# Patient Record
Sex: MALE | Race: WHITE | Employment: UNEMPLOYED | URBAN - NONMETROPOLITAN AREA
[De-identification: names, ages, dates, MRNs, and addresses within clinical notes are randomized per-mention and may not be internally consistent; named-entity substitution may affect disease eponyms.]

---

## 2023-03-15 PROBLEM — R07.9 CHEST PAIN: Status: ACTIVE | Noted: 2023-02-10

## 2023-05-15 RX ORDER — ATORVASTATIN CALCIUM 80 MG/1
TABLET, FILM COATED ORAL
Qty: 30 TABLET | Refills: 2 | Status: SHIPPED | OUTPATIENT
Start: 2023-05-15

## 2023-05-15 RX ORDER — ASPIRIN 81 MG/1
TABLET, COATED ORAL
Qty: 30 TABLET | Refills: 2 | Status: SHIPPED | OUTPATIENT
Start: 2023-05-15

## 2023-05-15 RX ORDER — PRASUGREL 10 MG/1
TABLET, FILM COATED ORAL
Qty: 30 TABLET | Refills: 2 | Status: SHIPPED | OUTPATIENT
Start: 2023-05-15

## 2023-05-15 NOTE — TELEPHONE ENCOUNTER
Requested Prescriptions     Pending Prescriptions Disp Refills    metoprolol tartrate (LOPRESSOR) 25 MG tablet [Pharmacy Med Name: METOPROLOL TARTRATE 25MG TABS] 60 tablet 0     Sig: TAKE ONE TABLET BY MOUTH TWICE A DAY    atorvastatin (LIPITOR) 80 MG tablet [Pharmacy Med Name: ATORVASTATIN CALCIUM 80MG TABS] 30 tablet 0     Sig: TAKE ONE TABLET BY MOUTH NIGHTLY    prasugrel (EFFIENT) 10 MG TABS [Pharmacy Med Name: PRASUGREL HCL 10MG TABS] 30 tablet 0     Sig: TAKE ONE TABLET BY MOUTH EVERY DAY    ASPIRIN LOW DOSE 81 MG EC tablet [Pharmacy Med Name: ASPIRIN LOW DOSE 81MG TBEC] 30 tablet 0     Sig: TAKE ONE TABLET BY MOUTH EVERY DAY     Last OV 3/9/23  NEXT OV 6/9/23

## 2023-05-16 RX ORDER — ASPIRIN 81 MG/1
81 TABLET ORAL DAILY
Qty: 30 TABLET | Refills: 2 | OUTPATIENT
Start: 2023-05-16

## 2023-05-16 RX ORDER — PRASUGREL 10 MG/1
10 TABLET, FILM COATED ORAL DAILY
Qty: 30 TABLET | Refills: 2 | OUTPATIENT
Start: 2023-05-16

## 2023-05-16 RX ORDER — ATORVASTATIN CALCIUM 80 MG/1
80 TABLET, FILM COATED ORAL NIGHTLY
Qty: 30 TABLET | Refills: 2 | OUTPATIENT
Start: 2023-05-16

## 2023-07-24 RX ORDER — INSULIN ASPART 100 [IU]/ML
INJECTION, SOLUTION INTRAVENOUS; SUBCUTANEOUS
Qty: 15 ML | Refills: 0 | Status: SHIPPED | OUTPATIENT
Start: 2023-07-24

## 2023-07-24 NOTE — TELEPHONE ENCOUNTER
Requested Prescriptions     Pending Prescriptions Disp Refills    NOVOLOG FLEXPEN 100 UNIT/ML injection pen [Pharmacy Med Name: Camilo Thomasin 100UNIT/ML SOPN] 15 mL 0     Sig: INJECT 5 UNITS UNDER THE SKIN EACH MORNING AND AT BEDTIME (USE 2 UNITS TO PRIME)

## 2023-08-07 ENCOUNTER — OFFICE VISIT (OUTPATIENT)
Dept: CARDIOLOGY CLINIC | Age: 48
End: 2023-08-07
Payer: MEDICAID

## 2023-08-07 VITALS
BODY MASS INDEX: 33.07 KG/M2 | DIASTOLIC BLOOD PRESSURE: 86 MMHG | SYSTOLIC BLOOD PRESSURE: 134 MMHG | WEIGHT: 266 LBS | HEIGHT: 75 IN | HEART RATE: 85 BPM

## 2023-08-07 DIAGNOSIS — Z95.5 H/O HEART ARTERY STENT: ICD-10-CM

## 2023-08-07 DIAGNOSIS — I21.4 NSTEMI (NON-ST ELEVATION MYOCARDIAL INFARCTION) (HCC): ICD-10-CM

## 2023-08-07 DIAGNOSIS — I25.10 CORONARY ARTERY DISEASE INVOLVING NATIVE CORONARY ARTERY OF NATIVE HEART WITHOUT ANGINA PECTORIS: ICD-10-CM

## 2023-08-07 DIAGNOSIS — I25.9 CHEST PAIN DUE TO MYOCARDIAL ISCHEMIA, UNSPECIFIED ISCHEMIC CHEST PAIN TYPE: Primary | ICD-10-CM

## 2023-08-07 PROCEDURE — G8417 CALC BMI ABV UP PARAM F/U: HCPCS | Performed by: INTERNAL MEDICINE

## 2023-08-07 PROCEDURE — 99213 OFFICE O/P EST LOW 20 MIN: CPT | Performed by: INTERNAL MEDICINE

## 2023-08-07 PROCEDURE — 4004F PT TOBACCO SCREEN RCVD TLK: CPT | Performed by: INTERNAL MEDICINE

## 2023-08-07 PROCEDURE — G8427 DOCREV CUR MEDS BY ELIG CLIN: HCPCS | Performed by: INTERNAL MEDICINE

## 2023-08-07 ASSESSMENT — ENCOUNTER SYMPTOMS
VOMITING: 0
DIARRHEA: 0
SHORTNESS OF BREATH: 0
EYES NEGATIVE: 1
NAUSEA: 0
RESPIRATORY NEGATIVE: 1
GASTROINTESTINAL NEGATIVE: 1

## 2023-08-07 NOTE — PROGRESS NOTES
Class 1 obesity     Diabetes mellitus (HCC)     DVT (deep venous thrombosis) ([address]) 10/27/2014    Left popliteal vein     Hyperlipidemia     Hypertension     Tobacco abuse      Past Surgical History:   Procedure Laterality Date    APPENDECTOMY      at around age 1-11 years    CAROTID STENT  2023    CHOLECYSTECTOMY, LAPAROSCOPIC       Family History   Problem Relation Age of Onset    High Blood Pressure Mother     Heart Attack Father 58    Elevated Lipids Sister     Other Sister         stomach issues    No Known Problems Brother     Heart Attack Maternal Uncle     Heart Attack Paternal Uncle 28         of a MI at age 28 years, was a smoker    Down Syndrome Son     No Known Problems Son     No Known Problems Daughter      No Known Allergies  Current Outpatient Medications   Medication Sig Dispense Refill    NOVOLOG FLEXPEN 100 UNIT/ML injection pen INJECT 5 UNITS UNDER THE SKIN EACH MORNING AND AT BEDTIME (USE 2 UNITS TO PRIME) 15 mL 0    metoprolol tartrate (LOPRESSOR) 25 MG tablet TAKE ONE TABLET BY MOUTH TWICE A DAY 60 tablet 2    atorvastatin (LIPITOR) 80 MG tablet TAKE ONE TABLET BY MOUTH NIGHTLY 30 tablet 2    prasugrel (EFFIENT) 10 MG TABS TAKE ONE TABLET BY MOUTH EVERY DAY 30 tablet 2    ASPIRIN LOW DOSE 81 MG EC tablet TAKE ONE TABLET BY MOUTH EVERY DAY 30 tablet 2    glipiZIDE (GLUCOTROL) 10 MG tablet Take 1 tablet by mouth 2 times daily 60 tablet 3    nicotine (NICODERM CQ) 21 MG/24HR Place 1 patch onto the skin daily 30 patch 3    blood glucose monitor strips Test 2 times a day & as needed for symptoms of irregular blood glucose. Dispense sufficient amount for indicated testing frequency plus additional to accommodate PRN testing needs.  100 strip 3    Lancets MISC 1 each by Does not apply route 2 times daily 100 each 5    metFORMIN (GLUCOPHAGE) 1000 MG tablet Take 1 tablet by mouth 2 times daily (with meals) 180 tablet 0    Blood Glucose Monitoring Suppl (ONE TOUCH ULTRA 2) w/Device KIT 1 kit by

## 2023-08-09 RX ORDER — ATORVASTATIN CALCIUM 80 MG/1
TABLET, FILM COATED ORAL
Qty: 30 TABLET | Refills: 2 | Status: SHIPPED | OUTPATIENT
Start: 2023-08-09

## 2023-08-09 RX ORDER — ASPIRIN 81 MG/1
TABLET, COATED ORAL
Qty: 30 TABLET | Refills: 2 | Status: SHIPPED | OUTPATIENT
Start: 2023-08-09

## 2023-08-15 ENCOUNTER — OFFICE VISIT (OUTPATIENT)
Dept: INTERNAL MEDICINE | Age: 48
End: 2023-08-15
Payer: MEDICAID

## 2023-08-15 VITALS
SYSTOLIC BLOOD PRESSURE: 140 MMHG | BODY MASS INDEX: 34 KG/M2 | OXYGEN SATURATION: 98 % | WEIGHT: 272 LBS | DIASTOLIC BLOOD PRESSURE: 80 MMHG | TEMPERATURE: 97.2 F | HEART RATE: 76 BPM

## 2023-08-15 DIAGNOSIS — J06.9 UPPER RESPIRATORY INFECTION WITH COUGH AND CONGESTION: Primary | ICD-10-CM

## 2023-08-15 DIAGNOSIS — R05.9 COUGH, UNSPECIFIED TYPE: ICD-10-CM

## 2023-08-15 LAB
INFLUENZA A ANTIGEN, POC: NEGATIVE
INFLUENZA B ANTIGEN, POC: NEGATIVE
LOT EXPIRE DATE: NORMAL
LOT KIT NUMBER: NORMAL
SARS-COV-2, POC: NORMAL
VALID INTERNAL CONTROL: YES
VENDOR AND KIT NAME POC: NORMAL

## 2023-08-15 PROCEDURE — G8427 DOCREV CUR MEDS BY ELIG CLIN: HCPCS | Performed by: NURSE PRACTITIONER

## 2023-08-15 PROCEDURE — 4004F PT TOBACCO SCREEN RCVD TLK: CPT | Performed by: NURSE PRACTITIONER

## 2023-08-15 PROCEDURE — 99213 OFFICE O/P EST LOW 20 MIN: CPT | Performed by: NURSE PRACTITIONER

## 2023-08-15 PROCEDURE — G8417 CALC BMI ABV UP PARAM F/U: HCPCS | Performed by: NURSE PRACTITIONER

## 2023-08-15 RX ORDER — METHYLPREDNISOLONE 4 MG/1
TABLET ORAL
Qty: 1 KIT | Refills: 0 | Status: SHIPPED | OUTPATIENT
Start: 2023-08-15

## 2023-08-15 RX ORDER — CEFDINIR 300 MG/1
300 CAPSULE ORAL 2 TIMES DAILY
Qty: 20 CAPSULE | Refills: 0 | Status: SHIPPED | OUTPATIENT
Start: 2023-08-15 | End: 2023-08-25

## 2023-08-15 RX ORDER — DEXAMETHASONE SODIUM PHOSPHATE 10 MG/ML
10 INJECTION INTRAMUSCULAR; INTRAVENOUS ONCE
Status: COMPLETED | OUTPATIENT
Start: 2023-08-15 | End: 2023-08-15

## 2023-08-15 RX ADMIN — DEXAMETHASONE SODIUM PHOSPHATE 10 MG: 10 INJECTION INTRAMUSCULAR; INTRAVENOUS at 14:08

## 2023-08-15 ASSESSMENT — ENCOUNTER SYMPTOMS
SORE THROAT: 0
COUGH: 1

## 2023-08-15 NOTE — PROGRESS NOTES
After obtaining consent, and per orders of Tara Galvez, injection of Dex 10 given in Dorsogluteal Left by Junior Ilan MA.  Patient left with no complaints

## 2023-08-15 NOTE — PROGRESS NOTES
[address] [facility] INTERNAL MEDICINE  [address] [facility],[address]  [address] [address]  Dept: [phone]  Dept Fax: [phone]: [phone]    Andreia Carter is a 51 y.o. male who presents today for his medical conditions/complaintsas noted below. Andreia Carter is c/o of Congestion, Cough, and Other (Wanting to switch Novolog to Venkata Carmen)        HPI:       Iqra Sanz presents today with congestion, cough. Started about two days ago. No fever or chills. He has tried cough drops without relief. No known ill contact. Past Medical History:   Diagnosis Date    Class 1 obesity     Diabetes mellitus ([address])     DVT (deep venous thrombosis) ([address]) 10/27/2014    Left popliteal vein     Hyperlipidemia     Hypertension     Tobacco abuse      Past Surgical History:   Procedure Laterality Date    APPENDECTOMY      at around age 1-11 years    CAROTID STENT  2023    CHOLECYSTECTOMY, LAPAROSCOPIC         Family History   Problem Relation Age of Onset    High Blood Pressure Mother     Heart Attack Father 58    Elevated Lipids Sister     Other Sister         stomach issues    No Known Problems Brother     Heart Attack Maternal Uncle     Heart Attack Paternal Uncle 28         of a MI at age 28 years, was a smoker    Down Syndrome Son     No Known Problems Son     No Known Problems Daughter        Social History     Tobacco Use    Smoking status: Every Day     Packs/day: 0.50     Years: 39.00     Pack years: 19.50     Types: Cigarettes     Start date:     Smokeless tobacco: Never    Tobacco comments:     Started at age 6-11 years, has always smoked a PPD   Substance Use Topics    Alcohol use: Yes     Comment: rare special occasions      Current Outpatient Medications   Medication Sig Dispense Refill    methylPREDNISolone (MEDROL DOSEPACK) 4 MG tablet Take by mouth.  1 kit 0    cefdinir (OMNICEF) 300 MG capsule Take 1 capsule by mouth 2 times daily for 10 days 20 capsule 0    metoprolol

## 2023-08-18 ENCOUNTER — APPOINTMENT (OUTPATIENT)
Dept: GENERAL RADIOLOGY | Age: 48
End: 2023-08-18
Payer: MEDICAID

## 2023-08-18 ENCOUNTER — HOSPITAL ENCOUNTER (EMERGENCY)
Age: 48
Discharge: HOME | End: 2023-08-18
Attending: EMERGENCY MEDICINE
Payer: MEDICAID

## 2023-08-18 VITALS
RESPIRATION RATE: 18 BRPM | OXYGEN SATURATION: 97 % | DIASTOLIC BLOOD PRESSURE: 84 MMHG | HEART RATE: 80 BPM | TEMPERATURE: 97.9 F | WEIGHT: 265 LBS | SYSTOLIC BLOOD PRESSURE: 129 MMHG | BODY MASS INDEX: 33.12 KG/M2

## 2023-08-18 DIAGNOSIS — J06.9 ACUTE UPPER RESPIRATORY INFECTION: Primary | ICD-10-CM

## 2023-08-18 DIAGNOSIS — B34.8 RHINOVIRUS INFECTION: ICD-10-CM

## 2023-08-18 LAB
ALBUMIN SERPL-MCNC: 4.3 G/DL (ref 3.5–5.2)
ALP SERPL-CCNC: 64 U/L (ref 40–130)
ALT SERPL-CCNC: 27 U/L (ref 5–41)
ANION GAP SERPL CALCULATED.3IONS-SCNC: 12 MMOL/L (ref 7–19)
AST SERPL-CCNC: 20 U/L (ref 5–40)
B PARAP IS1001 DNA NPH QL NAA+NON-PROBE: NOT DETECTED
B PERT.PT PRMT NPH QL NAA+NON-PROBE: NOT DETECTED
BASOPHILS # BLD: 0.1 K/UL (ref 0–0.2)
BASOPHILS NFR BLD: 0.9 % (ref 0–1)
BILIRUB SERPL-MCNC: 0.3 MG/DL (ref 0.2–1.2)
BNP BLD-MCNC: 58 PG/ML (ref 0–124)
BUN SERPL-MCNC: 13 MG/DL (ref 6–20)
C PNEUM DNA NPH QL NAA+NON-PROBE: NOT DETECTED
CALCIUM SERPL-MCNC: 8.9 MG/DL (ref 8.6–10)
CHLORIDE SERPL-SCNC: 96 MMOL/L (ref 98–111)
CO2 SERPL-SCNC: 26 MMOL/L (ref 22–29)
CREAT SERPL-MCNC: 0.8 MG/DL (ref 0.5–1.2)
D DIMER PPP FEU-MCNC: <0.27 UG/ML FEU (ref 0–0.48)
EOSINOPHIL # BLD: 0.1 K/UL (ref 0–0.6)
EOSINOPHIL NFR BLD: 1.2 % (ref 0–5)
ERYTHROCYTE [DISTWIDTH] IN BLOOD BY AUTOMATED COUNT: 13.4 % (ref 11.5–14.5)
FLUAV RNA NPH QL NAA+NON-PROBE: NOT DETECTED
FLUBV RNA NPH QL NAA+NON-PROBE: NOT DETECTED
GLUCOSE SERPL-MCNC: 293 MG/DL (ref 74–109)
HADV DNA NPH QL NAA+NON-PROBE: NOT DETECTED
HCOV 229E RNA NPH QL NAA+NON-PROBE: NOT DETECTED
HCOV HKU1 RNA NPH QL NAA+NON-PROBE: NOT DETECTED
HCOV NL63 RNA NPH QL NAA+NON-PROBE: NOT DETECTED
HCOV OC43 RNA NPH QL NAA+NON-PROBE: NOT DETECTED
HCT VFR BLD AUTO: 47.7 % (ref 42–52)
HGB BLD-MCNC: 15.8 G/DL (ref 14–18)
HMPV RNA NPH QL NAA+NON-PROBE: NOT DETECTED
HPIV1 RNA NPH QL NAA+NON-PROBE: NOT DETECTED
HPIV2 RNA NPH QL NAA+NON-PROBE: NOT DETECTED
HPIV3 RNA NPH QL NAA+NON-PROBE: NOT DETECTED
HPIV4 RNA NPH QL NAA+NON-PROBE: NOT DETECTED
IMM GRANULOCYTES # BLD: 0.1 K/UL
LYMPHOCYTES # BLD: 2.9 K/UL (ref 1.1–4.5)
LYMPHOCYTES NFR BLD: 25 % (ref 20–40)
M PNEUMO DNA NPH QL NAA+NON-PROBE: NOT DETECTED
MCH RBC QN AUTO: 30.2 PG (ref 27–31)
MCHC RBC AUTO-ENTMCNC: 33.1 G/DL (ref 33–37)
MCV RBC AUTO: 91 FL (ref 80–94)
MONOCYTES # BLD: 0.8 K/UL (ref 0–0.9)
MONOCYTES NFR BLD: 7.1 % (ref 0–10)
NEUTROPHILS # BLD: 7.6 K/UL (ref 1.5–7.5)
NEUTS SEG NFR BLD: 65.2 % (ref 50–65)
PLATELET # BLD AUTO: 289 K/UL (ref 130–400)
PMV BLD AUTO: 10.8 FL (ref 9.4–12.4)
POTASSIUM SERPL-SCNC: 3.8 MMOL/L (ref 3.5–5)
PROT SERPL-MCNC: 7.2 G/DL (ref 6.6–8.7)
RBC # BLD AUTO: 5.24 M/UL (ref 4.7–6.1)
RSV RNA NPH QL NAA+NON-PROBE: NOT DETECTED
RV+EV RNA NPH QL NAA+NON-PROBE: DETECTED
SARS-COV-2 RNA NPH QL NAA+NON-PROBE: NOT DETECTED
SODIUM SERPL-SCNC: 134 MMOL/L (ref 136–145)
TROPONIN T SERPL-MCNC: <0.01 NG/ML (ref 0–0.03)
WBC # BLD AUTO: 11.7 K/UL (ref 4.8–10.8)

## 2023-08-18 PROCEDURE — 83880 ASSAY OF NATRIURETIC PEPTIDE: CPT

## 2023-08-18 PROCEDURE — 71045 X-RAY EXAM CHEST 1 VIEW: CPT

## 2023-08-18 PROCEDURE — 85025 COMPLETE CBC W/AUTO DIFF WBC: CPT

## 2023-08-18 PROCEDURE — 84484 ASSAY OF TROPONIN QUANT: CPT

## 2023-08-18 PROCEDURE — 99285 EMERGENCY DEPT VISIT HI MDM: CPT

## 2023-08-18 PROCEDURE — 80053 COMPREHEN METABOLIC PANEL: CPT

## 2023-08-18 PROCEDURE — 85379 FIBRIN DEGRADATION QUANT: CPT

## 2023-08-18 PROCEDURE — 6370000000 HC RX 637 (ALT 250 FOR IP): Performed by: EMERGENCY MEDICINE

## 2023-08-18 PROCEDURE — 36415 COLL VENOUS BLD VENIPUNCTURE: CPT

## 2023-08-18 PROCEDURE — 93005 ELECTROCARDIOGRAM TRACING: CPT | Performed by: EMERGENCY MEDICINE

## 2023-08-18 PROCEDURE — 0202U NFCT DS 22 TRGT SARS-COV-2: CPT

## 2023-08-18 PROCEDURE — 6360000002 HC RX W HCPCS: Performed by: EMERGENCY MEDICINE

## 2023-08-18 PROCEDURE — 96374 THER/PROPH/DIAG INJ IV PUSH: CPT

## 2023-08-18 PROCEDURE — 94640 AIRWAY INHALATION TREATMENT: CPT

## 2023-08-18 RX ORDER — ALBUTEROL SULFATE 90 UG/1
2 AEROSOL, METERED RESPIRATORY (INHALATION) 4 TIMES DAILY PRN
Qty: 18 G | Refills: 1 | Status: SHIPPED | OUTPATIENT
Start: 2023-08-18

## 2023-08-18 RX ORDER — IPRATROPIUM BROMIDE AND ALBUTEROL SULFATE 2.5; .5 MG/3ML; MG/3ML
1 SOLUTION RESPIRATORY (INHALATION) ONCE
Status: COMPLETED | OUTPATIENT
Start: 2023-08-18 | End: 2023-08-18

## 2023-08-18 RX ADMIN — IPRATROPIUM BROMIDE AND ALBUTEROL SULFATE 1 DOSE: 2.5; .5 SOLUTION RESPIRATORY (INHALATION) at 21:32

## 2023-08-18 RX ADMIN — METHYLPREDNISOLONE SODIUM SUCCINATE 125 MG: 125 INJECTION, POWDER, FOR SOLUTION INTRAMUSCULAR; INTRAVENOUS at 20:16

## 2023-08-18 ASSESSMENT — ENCOUNTER SYMPTOMS
VOMITING: 0
SHORTNESS OF BREATH: 1
ABDOMINAL PAIN: 0
RHINORRHEA: 0
NAUSEA: 0
COUGH: 1
SORE THROAT: 0
BACK PAIN: 0
DIARRHEA: 0

## 2023-08-19 NOTE — ED PROVIDER NOTES
[facility] EMERGENCY DEPT  eMERGENCY dEPARTMENT eNCOUnter      Pt Name: Sarah Rangel  MRN: [id number]  [address] [facility] [place] 1975  Date of evaluation: 8/18/2023  Provider: Dany Connro MD    [address]       Chief Complaint   Patient presents with    Shortness of Breath    Cough         HISTORY OF PRESENT ILLNESS   (Location/Symptom, Timing/Onset,Context/Setting, Quality, Duration, Modifying Factors, Severity)  Note limiting factors. Sarah Rangel is a 51 y.o. male who presents to the emergency department for a 1 week history of cough shortness of breath and fatigue. Was seen at his primary care physician office and had a negative COVID and flu swab started on azithromycin and Medrol Dosepak has had some improvement but still not feeling well. Denies any chest pain but does admit to some mild pleuritic type discomfort. Cough has been productive but unable to specify exact description or color of his sputum. Denies any fevers or chills but has had some myalgias. Tells me he had a heart attack in February and had stents placed. Prior history of DVT 10 years ago approximately after an orthopedic injury. He is not on anticoagulants. Never had a PE. Denies any typical chest pain symptoms. HPI    NursingNotes were reviewed. REVIEW OF SYSTEMS    (2-9 systems for level 4, 10 or more for level 5)     Review of Systems   Constitutional:  Positive for fatigue. Negative for chills and fever. HENT:  Negative for rhinorrhea and sore throat. Respiratory:  Positive for cough and shortness of breath. Cardiovascular:  Negative for chest pain and leg swelling. Gastrointestinal:  Negative for abdominal pain, diarrhea, nausea and vomiting. Genitourinary:  Negative for dysuria and frequency. Musculoskeletal:  Positive for myalgias. Negative for back pain and neck pain. Neurological:  Negative for dizziness and headaches. All other systems reviewed and are negative.          PAST MEDICALHISTORY     Past Medical 2. Rhinovirus infection          DISPOSITION/PLAN   DISPOSITION Decision To Discharge 08/18/2023 09:48:37 PM      PATIENT REFERRED TO:  Pamella Troncoso, [address] [facility]  [phone]    Schedule an appointment as soon as possible for a visit in 1 week      [facility] EMERGENCY DEPT  [address]  [phone]    As needed, If symptoms worsen      DISCHARGE MEDICATIONS:  New Prescriptions    ALBUTEROL SULFATE HFA (VENTOLIN HFA) 108 (90 BASE) MCG/ACT INHALER    Inhale 2 puffs into the lungs 4 times daily as needed for Wheezing          (Please note that portions of this note were completed with a voice recognition program.  Efforts were made to edit thedictations but occasionally words are mis-transcribed.)    Jhon Stiles MD (electronically signed)Emergency Physician        Jhon Stiles MD  08/18/23 [id number]

## 2023-08-21 LAB
EKG P AXIS: 71 DEGREES
EKG P-R INTERVAL: 138 MS
EKG Q-T INTERVAL: 416 MS
EKG QRS DURATION: 104 MS
EKG QTC CALCULATION (BAZETT): 438 MS
EKG T AXIS: 56 DEGREES

## 2023-08-22 ENCOUNTER — TELEPHONE (OUTPATIENT)
Dept: INTERNAL MEDICINE | Age: 48
End: 2023-08-22

## 2023-08-22 ENCOUNTER — OFFICE VISIT (OUTPATIENT)
Dept: INTERNAL MEDICINE | Age: 48
End: 2023-08-22
Payer: MEDICAID

## 2023-08-22 VITALS
HEIGHT: 75 IN | BODY MASS INDEX: 32.7 KG/M2 | DIASTOLIC BLOOD PRESSURE: 72 MMHG | WEIGHT: 263 LBS | OXYGEN SATURATION: 97 % | HEART RATE: 65 BPM | SYSTOLIC BLOOD PRESSURE: 118 MMHG

## 2023-08-22 DIAGNOSIS — M79.605 BILATERAL LEG PAIN: ICD-10-CM

## 2023-08-22 DIAGNOSIS — Z79.4 TYPE 2 DIABETES MELLITUS WITHOUT COMPLICATION, WITH LONG-TERM CURRENT USE OF INSULIN (HCC): Primary | ICD-10-CM

## 2023-08-22 DIAGNOSIS — J06.9 URI WITH COUGH AND CONGESTION: ICD-10-CM

## 2023-08-22 DIAGNOSIS — E78.5 HYPERLIPIDEMIA, UNSPECIFIED HYPERLIPIDEMIA TYPE: ICD-10-CM

## 2023-08-22 DIAGNOSIS — E11.9 TYPE 2 DIABETES MELLITUS WITHOUT COMPLICATION, WITH LONG-TERM CURRENT USE OF INSULIN (HCC): Primary | ICD-10-CM

## 2023-08-22 DIAGNOSIS — M79.604 BILATERAL LEG PAIN: ICD-10-CM

## 2023-08-22 DIAGNOSIS — L81.9 DISCOLORATION OF SKIN OF LOWER LEG: ICD-10-CM

## 2023-08-22 DIAGNOSIS — E11.9 TYPE 2 DIABETES MELLITUS WITHOUT COMPLICATION, UNSPECIFIED WHETHER LONG TERM INSULIN USE (HCC): ICD-10-CM

## 2023-08-22 LAB
ALBUMIN SERPL-MCNC: 4.1 G/DL (ref 3.5–5.2)
ALP SERPL-CCNC: 63 U/L (ref 40–130)
ALT SERPL-CCNC: 32 U/L (ref 5–41)
ANION GAP SERPL CALCULATED.3IONS-SCNC: 11 MMOL/L (ref 7–19)
AST SERPL-CCNC: 23 U/L (ref 5–40)
BASOPHILS # BLD: 0.1 K/UL (ref 0–0.2)
BASOPHILS NFR BLD: 1 % (ref 0–1)
BILIRUB SERPL-MCNC: 0.9 MG/DL (ref 0.2–1.2)
BUN SERPL-MCNC: 12 MG/DL (ref 6–20)
CALCIUM SERPL-MCNC: 9.1 MG/DL (ref 8.6–10)
CHLORIDE SERPL-SCNC: 100 MMOL/L (ref 98–111)
CHOLEST SERPL-MCNC: 127 MG/DL (ref 160–199)
CO2 SERPL-SCNC: 27 MMOL/L (ref 22–29)
CREAT SERPL-MCNC: 0.9 MG/DL (ref 0.5–1.2)
EOSINOPHIL # BLD: 0 K/UL (ref 0–0.6)
EOSINOPHIL NFR BLD: 0 % (ref 0–5)
ERYTHROCYTE [DISTWIDTH] IN BLOOD BY AUTOMATED COUNT: 13.4 % (ref 11.5–14.5)
GLUCOSE SERPL-MCNC: 229 MG/DL (ref 74–109)
HBA1C MFR BLD: 7.8 % (ref 4–6)
HCT VFR BLD AUTO: 49.5 % (ref 42–52)
HDLC SERPL-MCNC: 44 MG/DL (ref 55–121)
HGB BLD-MCNC: 16.7 G/DL (ref 14–18)
IMM GRANULOCYTES # BLD: 0.1 K/UL
LDLC SERPL CALC-MCNC: 60 MG/DL
LYMPHOCYTES # BLD: 2.9 K/UL (ref 1.1–4.5)
LYMPHOCYTES NFR BLD: 30 % (ref 20–40)
MCH RBC QN AUTO: 30.5 PG (ref 27–31)
MCHC RBC AUTO-ENTMCNC: 33.7 G/DL (ref 33–37)
MCV RBC AUTO: 90.5 FL (ref 80–94)
MONOCYTES # BLD: 0.4 K/UL (ref 0–0.9)
MONOCYTES NFR BLD: 4 % (ref 0–10)
NEUTROPHILS # BLD: 6.1 K/UL (ref 1.5–7.5)
NEUTS SEG NFR BLD: 64 % (ref 50–65)
PLATELET # BLD AUTO: 315 K/UL (ref 130–400)
PLATELET SLIDE REVIEW: ADEQUATE
PMV BLD AUTO: 10.6 FL (ref 9.4–12.4)
POTASSIUM SERPL-SCNC: 4.1 MMOL/L (ref 3.5–5)
PROT SERPL-MCNC: 7 G/DL (ref 6.6–8.7)
RBC # BLD AUTO: 5.47 M/UL (ref 4.7–6.1)
SODIUM SERPL-SCNC: 138 MMOL/L (ref 136–145)
TRIGL SERPL-MCNC: 116 MG/DL (ref 0–149)
VARIANT LYMPHS NFR BLD: 1 % (ref 0–8)
WBC # BLD AUTO: 9.5 K/UL (ref 4.8–10.8)

## 2023-08-22 PROCEDURE — G8417 CALC BMI ABV UP PARAM F/U: HCPCS | Performed by: NURSE PRACTITIONER

## 2023-08-22 PROCEDURE — G8427 DOCREV CUR MEDS BY ELIG CLIN: HCPCS | Performed by: NURSE PRACTITIONER

## 2023-08-22 PROCEDURE — 2022F DILAT RTA XM EVC RTNOPTHY: CPT | Performed by: NURSE PRACTITIONER

## 2023-08-22 PROCEDURE — 4004F PT TOBACCO SCREEN RCVD TLK: CPT | Performed by: NURSE PRACTITIONER

## 2023-08-22 PROCEDURE — 99214 OFFICE O/P EST MOD 30 MIN: CPT | Performed by: NURSE PRACTITIONER

## 2023-08-22 PROCEDURE — 3051F HG A1C>EQUAL 7.0%<8.0%: CPT | Performed by: NURSE PRACTITIONER

## 2023-08-22 RX ORDER — DEXTROMETHORPHAN HYDROBROMIDE AND PROMETHAZINE HYDROCHLORIDE 15; 6.25 MG/5ML; MG/5ML
5 SYRUP ORAL NIGHTLY PRN
Qty: 120 ML | Refills: 0 | Status: SHIPPED | OUTPATIENT
Start: 2023-08-22

## 2023-08-22 RX ORDER — BENZONATATE 100 MG/1
100 CAPSULE ORAL 3 TIMES DAILY PRN
Qty: 21 CAPSULE | Refills: 0 | Status: SHIPPED | OUTPATIENT
Start: 2023-08-22 | End: 2023-08-29

## 2023-08-22 ASSESSMENT — ENCOUNTER SYMPTOMS
COUGH: 1
COLOR CHANGE: 1

## 2023-08-22 NOTE — PROGRESS NOTES
[address] [facility] INTERNAL MEDICINE  [address] [facility],[phone]  [address] [address]  Dept: [phone]  Dept Fax: [phone]: [phone]    Jennifer Mccarty is a 51 y.o. male who presents today for his medical conditions/complaintsas noted below. Jennifer Mccarty is c/o of Follow-up (1 week f/u)        HPI:     LORENZO Cardona presents today for diabetes follow up. He is currently taking metformin 1000mg BID, glipizide 10mg BID, and Novolog 5 units in the AM and HS. His A1c in Feb was 10.9. He missed his last follow up. Overall he reports that he is doing well. Checking his glucose at home and on average he is seeing it run 125-160. His URI got worse and had to go to ER. Still coughing and it is really bothering him. He is also concerned about his lower legs. They are discolored. They have been this way for a few years and seem to be getting worse. Left worsen than right. At times painful.    Past Medical History:   Diagnosis Date    Class 1 obesity     Diabetes mellitus ([address])     DVT (deep venous thrombosis) ([address]) 10/27/2014    Left popliteal vein     Hyperlipidemia     Hypertension     Tobacco abuse      Past Surgical History:   Procedure Laterality Date    APPENDECTOMY      at around age 1-11 years    CAROTID STENT  2023    CHOLECYSTECTOMY, LAPAROSCOPIC         Family History   Problem Relation Age of Onset    High Blood Pressure Mother     Heart Attack Father 58    Elevated Lipids Sister     Other Sister         stomach issues    No Known Problems Brother     Heart Attack Maternal Uncle     Heart Attack Paternal Uncle 28         of a MI at age 28 years, was a smoker    Down Syndrome Son     No Known Problems Son     No Known Problems Daughter        Social History     Tobacco Use    Smoking status: Every Day     Packs/day: 1.00     Years: 39.00     Pack years: 39.00     Types: Cigarettes     Start date:     Smokeless tobacco: Never    Tobacco comments:

## 2023-08-22 NOTE — TELEPHONE ENCOUNTER
Attempted to contact pt to inform him that he will need to get labs before appt today, NA and no VM.

## 2023-08-23 DIAGNOSIS — E11.9 TYPE 2 DIABETES MELLITUS WITHOUT COMPLICATION, WITH LONG-TERM CURRENT USE OF INSULIN (HCC): Primary | ICD-10-CM

## 2023-08-23 DIAGNOSIS — Z79.4 TYPE 2 DIABETES MELLITUS WITHOUT COMPLICATION, WITH LONG-TERM CURRENT USE OF INSULIN (HCC): Primary | ICD-10-CM

## 2023-08-28 RX ORDER — PRASUGREL 10 MG/1
TABLET, FILM COATED ORAL
Qty: 30 TABLET | Refills: 2 | Status: SHIPPED | OUTPATIENT
Start: 2023-08-28

## 2023-08-28 NOTE — TELEPHONE ENCOUNTER
Last OV 8/22/2023  Next OV 8/27/2023      Requested Prescriptions     Pending Prescriptions Disp Refills    metFORMIN (GLUCOPHAGE) 1000 MG tablet [Pharmacy Med Name: METFORMIN HCL 1000MG TABS] 180 tablet 0     Sig: TAKE ONE TABLET BY MOUTH TWICE A DAY WITH MEALS

## 2023-08-30 RX ORDER — GLIPIZIDE 10 MG/1
TABLET ORAL
Qty: 60 TABLET | Refills: 3 | Status: SHIPPED | OUTPATIENT
Start: 2023-08-30

## 2023-08-30 NOTE — TELEPHONE ENCOUNTER
Last OV 8/22/2023  Next OV 11/22/2023      Requested Prescriptions     Pending Prescriptions Disp Refills    glipiZIDE (GLUCOTROL) 10 MG tablet [Pharmacy Med Name: GLIPIZIDE 10MG TABS] 60 tablet 3     Sig: TAKE ONE TABLET BY MOUTH TWICE A DAY

## 2023-09-18 ENCOUNTER — HOSPITAL ENCOUNTER (OUTPATIENT)
Dept: NON INVASIVE DIAGNOSTICS | Age: 48
Discharge: HOME | End: 2023-09-18
Payer: MEDICAID

## 2023-09-18 DIAGNOSIS — L81.9 DISCOLORATION OF SKIN OF LOWER LEG: ICD-10-CM

## 2023-09-18 DIAGNOSIS — M79.604 BILATERAL LEG PAIN: ICD-10-CM

## 2023-09-18 DIAGNOSIS — M79.605 BILATERAL LEG PAIN: ICD-10-CM

## 2023-09-18 PROCEDURE — 93970 EXTREMITY STUDY: CPT

## 2023-09-18 PROCEDURE — 93923 UPR/LXTR ART STDY 3+ LVLS: CPT

## 2023-09-30 DIAGNOSIS — E11.9 TYPE 2 DIABETES MELLITUS WITHOUT COMPLICATION, WITH LONG-TERM CURRENT USE OF INSULIN (HCC): ICD-10-CM

## 2023-09-30 DIAGNOSIS — Z79.4 TYPE 2 DIABETES MELLITUS WITHOUT COMPLICATION, WITH LONG-TERM CURRENT USE OF INSULIN (HCC): ICD-10-CM

## 2023-10-02 NOTE — TELEPHONE ENCOUNTER
Requested Prescriptions     Pending Prescriptions Disp Refills    OZEMPIC, 0.25 OR 0.5 MG/DOSE, 2 MG/3ML SOPN [Pharmacy Med Name: Juancarlos Rojas (0.25 OR 0.5 MG/DOSE 2 SOPN] 3 mL 0     Sig: INJECT 0.25 NMG UNDER THE SKIN ONCE A WEEK FOR 4 DOSES, THEN INCREASE TO 0.5 MG WEEKLY

## 2023-10-03 RX ORDER — SEMAGLUTIDE 0.68 MG/ML
INJECTION, SOLUTION SUBCUTANEOUS
Qty: 3 ML | Refills: 0 | Status: SHIPPED | OUTPATIENT
Start: 2023-10-03

## 2023-10-19 RX ORDER — GLIPIZIDE 10 MG/1
TABLET ORAL
Qty: 60 TABLET | Refills: 3 | Status: SHIPPED | OUTPATIENT
Start: 2023-10-19

## 2023-10-19 RX ORDER — ISOPROPYL ALCOHOL 0.75 G/1
SWAB TOPICAL
Qty: 90 EACH | Refills: 5 | Status: SHIPPED | OUTPATIENT
Start: 2023-10-19

## 2023-10-19 NOTE — TELEPHONE ENCOUNTER
Last OV 8/22/2023  Next OV 11/22/2023      Requested Prescriptions     Pending Prescriptions Disp Refills    metFORMIN (GLUCOPHAGE) 1000 MG tablet [Pharmacy Med Name: METFORMIN HCL 1000MG TABS] 180 tablet 0     Sig: TAKE ONE TABLET BY MOUTH TWICE A DAY WITH MEALS    Alcohol Swabs (B-D SINGLE USE SWABS REGULAR) PADS [Pharmacy Med Name: BD SWAB SINGLE USE REGULAR  PADS] 90 each 5     Sig: USE WITH EACH BLOOD SUGAR TEST UP TO THREE TIMES A DAY    glipiZIDE (GLUCOTROL) 10 MG tablet [Pharmacy Med Name: GLIPIZIDE 10MG TABS] 60 tablet 3     Sig: TAKE ONE TABLET BY MOUTH TWICE A DAY

## 2023-11-05 DIAGNOSIS — E11.9 TYPE 2 DIABETES MELLITUS WITHOUT COMPLICATION, WITH LONG-TERM CURRENT USE OF INSULIN (HCC): ICD-10-CM

## 2023-11-05 DIAGNOSIS — Z79.4 TYPE 2 DIABETES MELLITUS WITHOUT COMPLICATION, WITH LONG-TERM CURRENT USE OF INSULIN (HCC): ICD-10-CM

## 2023-11-06 RX ORDER — SEMAGLUTIDE 0.68 MG/ML
INJECTION, SOLUTION SUBCUTANEOUS
Qty: 3 ML | Refills: 0 | Status: SHIPPED | OUTPATIENT
Start: 2023-11-06

## 2023-11-06 NOTE — TELEPHONE ENCOUNTER
Last OV 8/22/2023  Next OV 11/5/2023      Requested Prescriptions     Pending Prescriptions Disp Refills    OZEMPIC, 0.25 OR 0.5 MG/DOSE, 2 MG/3ML SOPN [Pharmacy Med Name: Constancia Allegra (0.25 OR 0.5 MG/DOSE 2 SOPN] 3 mL 0     Sig: INJECT 0.5 MG UNDER THE SKIN ONCE EACH WEEK AS DIRECTED

## 2023-11-14 ENCOUNTER — TELEPHONE (OUTPATIENT)
Dept: CARDIOLOGY CLINIC | Age: 48
End: 2023-11-14

## 2023-11-22 ENCOUNTER — OFFICE VISIT (OUTPATIENT)
Dept: INTERNAL MEDICINE | Age: 48
End: 2023-11-22
Payer: MEDICAID

## 2023-11-22 VITALS
HEART RATE: 93 BPM | WEIGHT: 275.6 LBS | SYSTOLIC BLOOD PRESSURE: 132 MMHG | DIASTOLIC BLOOD PRESSURE: 84 MMHG | OXYGEN SATURATION: 98 % | BODY MASS INDEX: 34.45 KG/M2

## 2023-11-22 DIAGNOSIS — E11.9 TYPE 2 DIABETES MELLITUS WITHOUT COMPLICATION, WITH LONG-TERM CURRENT USE OF INSULIN (HCC): ICD-10-CM

## 2023-11-22 DIAGNOSIS — Z79.4 TYPE 2 DIABETES MELLITUS WITHOUT COMPLICATION, WITH LONG-TERM CURRENT USE OF INSULIN (HCC): ICD-10-CM

## 2023-11-22 DIAGNOSIS — E11.9 TYPE 2 DIABETES MELLITUS WITHOUT COMPLICATION, WITHOUT LONG-TERM CURRENT USE OF INSULIN (HCC): Primary | ICD-10-CM

## 2023-11-22 LAB
ALBUMIN SERPL-MCNC: 4.7 G/DL (ref 3.5–5.2)
ALP SERPL-CCNC: 61 U/L (ref 40–130)
ALT SERPL-CCNC: 23 U/L (ref 5–41)
ANION GAP SERPL CALCULATED.3IONS-SCNC: 9 MMOL/L (ref 7–19)
AST SERPL-CCNC: 17 U/L (ref 5–40)
BILIRUB SERPL-MCNC: 0.3 MG/DL (ref 0.2–1.2)
BUN SERPL-MCNC: 11 MG/DL (ref 6–20)
CALCIUM SERPL-MCNC: 9.8 MG/DL (ref 8.6–10)
CHLORIDE SERPL-SCNC: 102 MMOL/L (ref 98–111)
CO2 SERPL-SCNC: 28 MMOL/L (ref 22–29)
CREAT SERPL-MCNC: 0.8 MG/DL (ref 0.5–1.2)
ERYTHROCYTE [DISTWIDTH] IN BLOOD BY AUTOMATED COUNT: 13.5 % (ref 11.5–14.5)
GLUCOSE SERPL-MCNC: 205 MG/DL (ref 74–109)
HBA1C MFR BLD: 6.5 % (ref 4–6)
HCT VFR BLD AUTO: 47.1 % (ref 42–52)
HGB BLD-MCNC: 15.3 G/DL (ref 14–18)
MCH RBC QN AUTO: 30.2 PG (ref 27–31)
MCHC RBC AUTO-ENTMCNC: 32.5 G/DL (ref 33–37)
MCV RBC AUTO: 93.1 FL (ref 80–94)
PLATELET # BLD AUTO: 301 K/UL (ref 130–400)
PMV BLD AUTO: 10.9 FL (ref 9.4–12.4)
POTASSIUM SERPL-SCNC: 4.2 MMOL/L (ref 3.5–5)
PROT SERPL-MCNC: 7.4 G/DL (ref 6.6–8.7)
RBC # BLD AUTO: 5.06 M/UL (ref 4.7–6.1)
SODIUM SERPL-SCNC: 139 MMOL/L (ref 136–145)
WBC # BLD AUTO: 7.6 K/UL (ref 4.8–10.8)

## 2023-11-22 PROCEDURE — 3044F HG A1C LEVEL LT 7.0%: CPT | Performed by: NURSE PRACTITIONER

## 2023-11-22 PROCEDURE — 2022F DILAT RTA XM EVC RTNOPTHY: CPT | Performed by: NURSE PRACTITIONER

## 2023-11-22 PROCEDURE — 4004F PT TOBACCO SCREEN RCVD TLK: CPT | Performed by: NURSE PRACTITIONER

## 2023-11-22 PROCEDURE — G8417 CALC BMI ABV UP PARAM F/U: HCPCS | Performed by: NURSE PRACTITIONER

## 2023-11-22 PROCEDURE — 99213 OFFICE O/P EST LOW 20 MIN: CPT | Performed by: NURSE PRACTITIONER

## 2023-11-22 PROCEDURE — G8484 FLU IMMUNIZE NO ADMIN: HCPCS | Performed by: NURSE PRACTITIONER

## 2023-11-22 PROCEDURE — G8427 DOCREV CUR MEDS BY ELIG CLIN: HCPCS | Performed by: NURSE PRACTITIONER

## 2023-11-22 NOTE — RESULT ENCOUNTER NOTE
Attempted to call pt and wife. Neither answered and neither has voicemail set up. I will try again.

## 2023-11-27 DIAGNOSIS — E11.9 TYPE 2 DIABETES MELLITUS WITHOUT COMPLICATION, WITHOUT LONG-TERM CURRENT USE OF INSULIN (HCC): ICD-10-CM

## 2023-12-22 ENCOUNTER — APPOINTMENT (OUTPATIENT)
Dept: CARDIAC CATH/INVASIVE PROCEDURES | Age: 48
DRG: 250 | End: 2023-12-22
Payer: MEDICAID

## 2023-12-22 ENCOUNTER — HOSPITAL ENCOUNTER (INPATIENT)
Age: 48
LOS: 2 days | Discharge: HOME | DRG: 250 | End: 2023-12-24
Attending: EMERGENCY MEDICINE | Admitting: INTERNAL MEDICINE
Payer: MEDICAID

## 2023-12-22 ENCOUNTER — APPOINTMENT (OUTPATIENT)
Dept: GENERAL RADIOLOGY | Age: 48
DRG: 250 | End: 2023-12-22
Payer: MEDICAID

## 2023-12-22 DIAGNOSIS — I21.4 NSTEMI (NON-ST ELEVATED MYOCARDIAL INFARCTION) (HCC): Primary | ICD-10-CM

## 2023-12-22 PROBLEM — I21.3 STEMI (ST ELEVATION MYOCARDIAL INFARCTION) (HCC): Status: ACTIVE | Noted: 2023-12-22

## 2023-12-22 LAB
ALBUMIN SERPL-MCNC: 4.4 G/DL (ref 3.5–5.2)
ALP SERPL-CCNC: 70 U/L (ref 40–130)
ALT SERPL-CCNC: 23 U/L (ref 5–41)
ANION GAP SERPL CALCULATED.3IONS-SCNC: 12 MMOL/L (ref 7–19)
ANTI-XA UNFRAC HEPARIN: <0.1 IU/ML (ref 0.3–0.7)
ANTI-XA UNFRAC HEPARIN: <0.1 IU/ML (ref 0.3–0.7)
AST SERPL-CCNC: 20 U/L (ref 5–40)
BASOPHILS # BLD: 0.2 K/UL (ref 0–0.2)
BASOPHILS NFR BLD: 1.8 % (ref 0–1)
BILIRUB SERPL-MCNC: 0.5 MG/DL (ref 0.2–1.2)
BNP BLD-MCNC: <36 PG/ML (ref 0–124)
BUN SERPL-MCNC: 11 MG/DL (ref 6–20)
CALCIUM SERPL-MCNC: 10 MG/DL (ref 8.6–10)
CHLORIDE SERPL-SCNC: 103 MMOL/L (ref 98–111)
CHOLEST SERPL-MCNC: 154 MG/DL (ref 160–199)
CO2 SERPL-SCNC: 23 MMOL/L (ref 22–29)
CREAT SERPL-MCNC: 0.9 MG/DL (ref 0.5–1.2)
D DIMER PPP FEU-MCNC: 0.38 UG/ML FEU (ref 0–0.48)
EOSINOPHIL # BLD: 0.3 K/UL (ref 0–0.6)
EOSINOPHIL NFR BLD: 3.9 % (ref 0–5)
ERYTHROCYTE [DISTWIDTH] IN BLOOD BY AUTOMATED COUNT: 13.4 % (ref 11.5–14.5)
GLUCOSE BLD-MCNC: 169 MG/DL (ref 70–99)
GLUCOSE BLD-MCNC: 208 MG/DL (ref 70–99)
GLUCOSE SERPL-MCNC: 293 MG/DL (ref 74–109)
HCT VFR BLD AUTO: 45.1 % (ref 42–52)
HDLC SERPL-MCNC: 53 MG/DL (ref 55–121)
HGB BLD-MCNC: 15.4 G/DL (ref 14–18)
IMM GRANULOCYTES # BLD: 0 K/UL
LDLC SERPL CALC-MCNC: 86 MG/DL
LIPASE SERPL-CCNC: 39 U/L (ref 13–60)
LYMPHOCYTES # BLD: 1.9 K/UL (ref 1.1–4.5)
LYMPHOCYTES NFR BLD: 21.8 % (ref 20–40)
MCH RBC QN AUTO: 30.6 PG (ref 27–31)
MCHC RBC AUTO-ENTMCNC: 34.1 G/DL (ref 33–37)
MCV RBC AUTO: 89.7 FL (ref 80–94)
MONOCYTES # BLD: 0.6 K/UL (ref 0–0.9)
MONOCYTES NFR BLD: 6.4 % (ref 0–10)
NEUTROPHILS # BLD: 5.6 K/UL (ref 1.5–7.5)
NEUTS SEG NFR BLD: 65.6 % (ref 50–65)
PERFORMED ON: ABNORMAL
PERFORMED ON: ABNORMAL
PLATELET # BLD AUTO: 279 K/UL (ref 130–400)
PMV BLD AUTO: 10.4 FL (ref 9.4–12.4)
POC ACT LR: 212 SEC
POTASSIUM SERPL-SCNC: 4.1 MMOL/L (ref 3.5–5)
PROT SERPL-MCNC: 7.4 G/DL (ref 6.6–8.7)
RBC # BLD AUTO: 5.03 M/UL (ref 4.7–6.1)
SODIUM SERPL-SCNC: 138 MMOL/L (ref 136–145)
TRIGL SERPL-MCNC: 74 MG/DL (ref 0–149)
TROPONIN, HIGH SENSITIVITY: 35 NG/L (ref 0–22)
WBC # BLD AUTO: 8.6 K/UL (ref 4.8–10.8)

## 2023-12-22 PROCEDURE — C1725 CATH, TRANSLUMIN NON-LASER: HCPCS

## 2023-12-22 PROCEDURE — C1887 CATHETER, GUIDING: HCPCS

## 2023-12-22 PROCEDURE — 6370000000 HC RX 637 (ALT 250 FOR IP): Performed by: INTERNAL MEDICINE

## 2023-12-22 PROCEDURE — 02C03ZZ EXTIRPATION OF MATTER FROM CORONARY ARTERY, ONE ARTERY, PERCUTANEOUS APPROACH: ICD-10-PCS | Performed by: INTERNAL MEDICINE

## 2023-12-22 PROCEDURE — C1894 INTRO/SHEATH, NON-LASER: HCPCS

## 2023-12-22 PROCEDURE — 92973 PRQ TRLUML C MCHN ASP THRMBC: CPT

## 2023-12-22 PROCEDURE — 99255 IP/OBS CONSLTJ NEW/EST HI 80: CPT | Performed by: INTERNAL MEDICINE

## 2023-12-22 PROCEDURE — 93005 ELECTROCARDIOGRAM TRACING: CPT

## 2023-12-22 PROCEDURE — 6360000004 HC RX CONTRAST MEDICATION: Performed by: INTERNAL MEDICINE

## 2023-12-22 PROCEDURE — 93571 IV DOP VEL&/PRESS C FLO 1ST: CPT

## 2023-12-22 PROCEDURE — 94760 N-INVAS EAR/PLS OXIMETRY 1: CPT

## 2023-12-22 PROCEDURE — 93454 CORONARY ARTERY ANGIO S&I: CPT

## 2023-12-22 PROCEDURE — 71045 X-RAY EXAM CHEST 1 VIEW: CPT

## 2023-12-22 PROCEDURE — 99285 EMERGENCY DEPT VISIT HI MDM: CPT

## 2023-12-22 PROCEDURE — 92978 ENDOLUMINL IVUS OCT C 1ST: CPT

## 2023-12-22 PROCEDURE — C1769 GUIDE WIRE: HCPCS

## 2023-12-22 PROCEDURE — 85025 COMPLETE CBC W/AUTO DIFF WBC: CPT

## 2023-12-22 PROCEDURE — B2111ZZ FLUOROSCOPY OF MULTIPLE CORONARY ARTERIES USING LOW OSMOLAR CONTRAST: ICD-10-PCS | Performed by: INTERNAL MEDICINE

## 2023-12-22 PROCEDURE — 96365 THER/PROPH/DIAG IV INF INIT: CPT

## 2023-12-22 PROCEDURE — 83880 ASSAY OF NATRIURETIC PEPTIDE: CPT

## 2023-12-22 PROCEDURE — 2720000010 HC SURG SUPPLY STERILE

## 2023-12-22 PROCEDURE — 6360000002 HC RX W HCPCS

## 2023-12-22 PROCEDURE — 4A023N7 MEASUREMENT OF CARDIAC SAMPLING AND PRESSURE, LEFT HEART, PERCUTANEOUS APPROACH: ICD-10-PCS | Performed by: INTERNAL MEDICINE

## 2023-12-22 PROCEDURE — 80053 COMPREHEN METABOLIC PANEL: CPT

## 2023-12-22 PROCEDURE — 99152 MOD SED SAME PHYS/QHP 5/>YRS: CPT

## 2023-12-22 PROCEDURE — 36415 COLL VENOUS BLD VENIPUNCTURE: CPT

## 2023-12-22 PROCEDURE — B245ZZ3 ULTRASONOGRAPHY OF LEFT HEART, INTRAVASCULAR: ICD-10-PCS | Performed by: INTERNAL MEDICINE

## 2023-12-22 PROCEDURE — 83690 ASSAY OF LIPASE: CPT

## 2023-12-22 PROCEDURE — 6370000000 HC RX 637 (ALT 250 FOR IP)

## 2023-12-22 PROCEDURE — 1210000000 HC MED SURG R&B

## 2023-12-22 PROCEDURE — 99153 MOD SED SAME PHYS/QHP EA: CPT

## 2023-12-22 PROCEDURE — 027034Z DILATION OF CORONARY ARTERY, ONE ARTERY WITH DRUG-ELUTING INTRALUMINAL DEVICE, PERCUTANEOUS APPROACH: ICD-10-PCS | Performed by: INTERNAL MEDICINE

## 2023-12-22 PROCEDURE — 96375 TX/PRO/DX INJ NEW DRUG ADDON: CPT

## 2023-12-22 PROCEDURE — 85347 COAGULATION TIME ACTIVATED: CPT

## 2023-12-22 PROCEDURE — 82962 GLUCOSE BLOOD TEST: CPT

## 2023-12-22 PROCEDURE — 80061 LIPID PANEL: CPT

## 2023-12-22 PROCEDURE — C1753 CATH, INTRAVAS ULTRASOUND: HCPCS

## 2023-12-22 PROCEDURE — 92920 PRQ TRLUML C ANGIOP 1ART&/BR: CPT

## 2023-12-22 PROCEDURE — 84484 ASSAY OF TROPONIN QUANT: CPT

## 2023-12-22 PROCEDURE — 85520 HEPARIN ASSAY: CPT

## 2023-12-22 PROCEDURE — 6360000002 HC RX W HCPCS: Performed by: EMERGENCY MEDICINE

## 2023-12-22 PROCEDURE — 2709999900 HC NON-CHARGEABLE SUPPLY

## 2023-12-22 PROCEDURE — 2580000003 HC RX 258: Performed by: INTERNAL MEDICINE

## 2023-12-22 PROCEDURE — 02703ZZ DILATION OF CORONARY ARTERY, ONE ARTERY, PERCUTANEOUS APPROACH: ICD-10-PCS | Performed by: INTERNAL MEDICINE

## 2023-12-22 PROCEDURE — 2000000000 HC ICU R&B

## 2023-12-22 PROCEDURE — 85379 FIBRIN DEGRADATION QUANT: CPT

## 2023-12-22 PROCEDURE — 2500000003 HC RX 250 WO HCPCS

## 2023-12-22 PROCEDURE — 96374 THER/PROPH/DIAG INJ IV PUSH: CPT

## 2023-12-22 RX ORDER — LOSARTAN POTASSIUM 50 MG/1
50 TABLET ORAL DAILY
Status: DISCONTINUED | OUTPATIENT
Start: 2023-12-22 | End: 2023-12-24 | Stop reason: HOSPADM

## 2023-12-22 RX ORDER — NICOTINE 21 MG/24HR
1 PATCH, TRANSDERMAL 24 HOURS TRANSDERMAL DAILY
Status: DISCONTINUED | OUTPATIENT
Start: 2023-12-22 | End: 2023-12-24 | Stop reason: HOSPADM

## 2023-12-22 RX ORDER — PRASUGREL 10 MG/1
10 TABLET, FILM COATED ORAL DAILY
Status: DISCONTINUED | OUTPATIENT
Start: 2023-12-23 | End: 2023-12-24 | Stop reason: HOSPADM

## 2023-12-22 RX ORDER — INSULIN LISPRO 100 [IU]/ML
0-8 INJECTION, SOLUTION INTRAVENOUS; SUBCUTANEOUS
Status: DISCONTINUED | OUTPATIENT
Start: 2023-12-22 | End: 2023-12-24 | Stop reason: HOSPADM

## 2023-12-22 RX ORDER — SODIUM CHLORIDE 0.9 % (FLUSH) 0.9 %
5-40 SYRINGE (ML) INJECTION EVERY 12 HOURS SCHEDULED
Status: DISCONTINUED | OUTPATIENT
Start: 2023-12-22 | End: 2023-12-24 | Stop reason: HOSPADM

## 2023-12-22 RX ORDER — MORPHINE SULFATE 4 MG/ML
4 INJECTION, SOLUTION INTRAMUSCULAR; INTRAVENOUS ONCE
Status: COMPLETED | OUTPATIENT
Start: 2023-12-22 | End: 2023-12-22

## 2023-12-22 RX ORDER — METOPROLOL SUCCINATE 50 MG/1
50 TABLET, EXTENDED RELEASE ORAL DAILY
Status: DISCONTINUED | OUTPATIENT
Start: 2023-12-23 | End: 2023-12-24 | Stop reason: HOSPADM

## 2023-12-22 RX ORDER — SODIUM CHLORIDE 0.9 % (FLUSH) 0.9 %
5-40 SYRINGE (ML) INJECTION PRN
Status: DISCONTINUED | OUTPATIENT
Start: 2023-12-22 | End: 2023-12-24 | Stop reason: HOSPADM

## 2023-12-22 RX ORDER — IODIXANOL 320 MG/ML
214 INJECTION, SOLUTION INTRAVASCULAR
Status: COMPLETED | OUTPATIENT
Start: 2023-12-22 | End: 2023-12-22

## 2023-12-22 RX ORDER — ASPIRIN 81 MG/1
81 TABLET ORAL DAILY
Status: DISCONTINUED | OUTPATIENT
Start: 2023-12-23 | End: 2023-12-24 | Stop reason: HOSPADM

## 2023-12-22 RX ORDER — SODIUM CHLORIDE 9 MG/ML
INJECTION, SOLUTION INTRAVENOUS CONTINUOUS
Status: ACTIVE | OUTPATIENT
Start: 2023-12-22 | End: 2023-12-23

## 2023-12-22 RX ORDER — ALBUTEROL SULFATE 90 UG/1
2 AEROSOL, METERED RESPIRATORY (INHALATION) EVERY 4 HOURS PRN
Status: DISCONTINUED | OUTPATIENT
Start: 2023-12-22 | End: 2023-12-24 | Stop reason: HOSPADM

## 2023-12-22 RX ORDER — NITROGLYCERIN 20 MG/100ML
5-200 INJECTION INTRAVENOUS CONTINUOUS
Status: DISCONTINUED | OUTPATIENT
Start: 2023-12-22 | End: 2023-12-22

## 2023-12-22 RX ORDER — MORPHINE SULFATE 4 MG/ML
INJECTION, SOLUTION INTRAMUSCULAR; INTRAVENOUS
Status: DISPENSED
Start: 2023-12-22 | End: 2023-12-23

## 2023-12-22 RX ORDER — HEPARIN SODIUM 10000 [USP'U]/100ML
5-30 INJECTION, SOLUTION INTRAVENOUS CONTINUOUS
Status: DISCONTINUED | OUTPATIENT
Start: 2023-12-22 | End: 2023-12-22

## 2023-12-22 RX ORDER — ATORVASTATIN CALCIUM 40 MG/1
80 TABLET, FILM COATED ORAL NIGHTLY
Status: DISCONTINUED | OUTPATIENT
Start: 2023-12-22 | End: 2023-12-24 | Stop reason: HOSPADM

## 2023-12-22 RX ORDER — ACETAMINOPHEN 325 MG/1
650 TABLET ORAL EVERY 4 HOURS PRN
Status: DISCONTINUED | OUTPATIENT
Start: 2023-12-22 | End: 2023-12-24 | Stop reason: HOSPADM

## 2023-12-22 RX ORDER — ONDANSETRON 2 MG/ML
4 INJECTION INTRAMUSCULAR; INTRAVENOUS EVERY 6 HOURS PRN
Status: DISCONTINUED | OUTPATIENT
Start: 2023-12-22 | End: 2023-12-24 | Stop reason: HOSPADM

## 2023-12-22 RX ORDER — HEPARIN SODIUM 1000 [USP'U]/ML
2000 INJECTION, SOLUTION INTRAVENOUS; SUBCUTANEOUS PRN
Status: DISCONTINUED | OUTPATIENT
Start: 2023-12-22 | End: 2023-12-22 | Stop reason: ALTCHOICE

## 2023-12-22 RX ORDER — SODIUM CHLORIDE 9 MG/ML
INJECTION, SOLUTION INTRAVENOUS PRN
Status: DISCONTINUED | OUTPATIENT
Start: 2023-12-22 | End: 2023-12-24 | Stop reason: HOSPADM

## 2023-12-22 RX ORDER — DEXTROSE MONOHYDRATE 100 MG/ML
INJECTION, SOLUTION INTRAVENOUS CONTINUOUS PRN
Status: DISCONTINUED | OUTPATIENT
Start: 2023-12-22 | End: 2023-12-24 | Stop reason: HOSPADM

## 2023-12-22 RX ORDER — HEPARIN SODIUM 1000 [USP'U]/ML
4000 INJECTION, SOLUTION INTRAVENOUS; SUBCUTANEOUS PRN
Status: DISCONTINUED | OUTPATIENT
Start: 2023-12-22 | End: 2023-12-22 | Stop reason: ALTCHOICE

## 2023-12-22 RX ADMIN — LOSARTAN POTASSIUM 50 MG: 50 TABLET, FILM COATED ORAL at 20:01

## 2023-12-22 RX ADMIN — MORPHINE SULFATE 4 MG: 4 INJECTION, SOLUTION INTRAMUSCULAR; INTRAVENOUS at 13:39

## 2023-12-22 RX ADMIN — ATORVASTATIN CALCIUM 80 MG: 40 TABLET, FILM COATED ORAL at 20:01

## 2023-12-22 RX ADMIN — MORPHINE SULFATE 4 MG: 4 INJECTION, SOLUTION INTRAMUSCULAR; INTRAVENOUS at 14:45

## 2023-12-22 RX ADMIN — IODIXANOL 214 ML: 320 INJECTION, SOLUTION INTRAVASCULAR at 17:28

## 2023-12-22 RX ADMIN — NITROGLYCERIN 25 MCG/MIN: 20 INJECTION INTRAVENOUS at 15:15

## 2023-12-22 RX ADMIN — NITROGLYCERIN 20 MCG/MIN: 20 INJECTION INTRAVENOUS at 14:59

## 2023-12-22 RX ADMIN — METFORMIN HYDROCHLORIDE 500 MG: 500 TABLET ORAL at 18:48

## 2023-12-22 RX ADMIN — SODIUM CHLORIDE: 9 INJECTION, SOLUTION INTRAVENOUS at 19:55

## 2023-12-22 RX ADMIN — HEPARIN SODIUM 8 UNITS/KG/HR: 10000 INJECTION, SOLUTION INTRAVENOUS at 15:02

## 2023-12-22 SDOH — ECONOMIC STABILITY: INCOME INSECURITY: HOW HARD IS IT FOR YOU TO PAY FOR THE VERY BASICS LIKE FOOD, HOUSING, MEDICAL CARE, AND HEATING?: NOT HARD AT ALL

## 2023-12-22 SDOH — ECONOMIC STABILITY: INCOME INSECURITY: IN THE PAST 12 MONTHS, HAS THE ELECTRIC, GAS, OIL, OR WATER COMPANY THREATENED TO SHUT OFF SERVICE IN YOUR HOME?: NO

## 2023-12-22 SDOH — ECONOMIC STABILITY: FOOD INSECURITY: WITHIN THE PAST 12 MONTHS, YOU WORRIED THAT YOUR FOOD WOULD RUN OUT BEFORE YOU GOT MONEY TO BUY MORE.: NEVER TRUE

## 2023-12-22 ASSESSMENT — PATIENT HEALTH QUESTIONNAIRE - PHQ9
SUM OF ALL RESPONSES TO PHQ QUESTIONS 1-9: 0
2. FEELING DOWN, DEPRESSED OR HOPELESS: 0
1. LITTLE INTEREST OR PLEASURE IN DOING THINGS: 0
SUM OF ALL RESPONSES TO PHQ9 QUESTIONS 1 & 2: 0
SUM OF ALL RESPONSES TO PHQ QUESTIONS 1-9: 0

## 2023-12-22 NOTE — ED PROVIDER NOTES
[address] [facility] EMERGENCY DEPT  eMERGENCY dEPARTMENT eNCOUnter      Pt Name: Jennifer Mccarty  MRN: [id number]  [address] [facility] 1975  Date of evaluation: 12/22/2023  Provider: Bee Eubanks MD    CHIEF COMPLAINT       Chief Complaint   Patient presents with    Chest Pain     Sent form [facility] for potential stemi         HISTORY OF PRESENT ILLNESS   (Location/Symptom, Timing/Onset,Context/Setting, Quality, Duration, Modifying Factors, Severity)  Note limiting factors. Jennifer Mccarty is a 51 y.o. male who presents to the emergency department chest pain     HPI    Patient is a 56-year-old white male with history of obesity; diabetes; hyperlipidemia; hypertension; tobacco abuse; CAD status post stenting in the past; presents with acute onset of chest pain associated diaphoresis and a heaviness to his left chest with shortness of breath beginning at 11 AM this morning. Seen at [facility] and directed to the ED for possible STEMI. He was given a heparin bolus At [facility]. Pain controled intermittently with with morphine and nitroglycerin. Continued to have pain however. NursingNotes were reviewed. REVIEW OF SYSTEMS    (2-9 systems for level 4, 10 or more for level 5)     Review of Systems   Constitutional:  Negative for activity change, appetite change, chills, diaphoresis, fatigue and fever. HENT:  Negative for congestion, facial swelling, hearing loss and nosebleeds. Eyes:  Negative for photophobia, pain, discharge and itching. Respiratory:  Positive for chest tightness. Negative for cough, shortness of breath and wheezing. Cardiovascular:  Negative for chest pain and palpitations. Gastrointestinal:  Negative for abdominal pain, constipation, diarrhea, nausea and rectal pain. Endocrine: Negative for cold intolerance and heat intolerance. Genitourinary:  Negative for difficulty urinating and dysuria.    Musculoskeletal:  Negative for arthralgias, back pain, joint swelling and neck

## 2023-12-22 NOTE — H&P
[facility]  Cardiology Consult      Requesting MD:  Kayley Daniel MD   Admit Status:         History obtained from:   [] Patient  [] Other (specify):     Patient:  Elisabeth Joiner  [id number]     Chief Complaint:   Chief Complaint   Patient presents with    Chest Pain     Sent form [facility] for potential stemi         HPI: Mr. Asim Leger is a 51 y.o. male with a history of LAD STEMI in February 2023 status post PCI hypertension hyperlipidemia diabetes smoking DVT 2014, presents with acute onset chest pain starting at 11:00. Went to [facility] emergency room with EKG findings concerning for LAD STEMI. Was transferred urgently here. EKG done showed improvement in the STsegments in the anterior leads no longer meeting STEMI criteria and patient's chest pain was also starting to improve suggesting possible reperfusion. However about an hour later in the emergency room his chest pain started to worsen again and patient was taken to the cardiac Cath Lab. The LAD was found to have in-stent thrombosis in the proximal LAD stent. Intravascular OCT showed mallapposition of  stent with thrombus. This was treated with thrombectomy and balloon angioplasty. Repeat imaging showed improved apposition of the prior stent with brisk flow through the LAD. Decision was made not to put a second layer of stent due to mechanism being stent thrombosis. Patient became chest pain-free during revascularization    Review of Systems:  Review of Systems   Respiratory:  Positive for chest tightness. All other systems reviewed and are negative.       Cardiac Specific Data:  Specialty Problems          Cardiology Problems    Chest pain        NSTEMI (non-ST elevation myocardial infarction) ([address])        DVT (deep venous thrombosis) (HCC)           Past Medical History:  Past Medical History:   Diagnosis Date    Class 1 obesity     Diabetes mellitus ([address])     DVT (deep venous thrombosis) ([address])

## 2023-12-23 LAB
ANION GAP SERPL CALCULATED.3IONS-SCNC: 12 MMOL/L (ref 7–19)
BUN SERPL-MCNC: 10 MG/DL (ref 6–20)
CALCIUM SERPL-MCNC: 8.5 MG/DL (ref 8.6–10)
CHLORIDE SERPL-SCNC: 105 MMOL/L (ref 98–111)
CO2 SERPL-SCNC: 23 MMOL/L (ref 22–29)
CREAT SERPL-MCNC: 0.6 MG/DL (ref 0.5–1.2)
ERYTHROCYTE [DISTWIDTH] IN BLOOD BY AUTOMATED COUNT: 13.4 % (ref 11.5–14.5)
GLUCOSE BLD-MCNC: 203 MG/DL (ref 70–99)
GLUCOSE BLD-MCNC: 245 MG/DL (ref 70–99)
GLUCOSE BLD-MCNC: 249 MG/DL (ref 70–99)
GLUCOSE BLD-MCNC: 315 MG/DL (ref 70–99)
GLUCOSE SERPL-MCNC: 178 MG/DL (ref 74–109)
HBA1C MFR BLD: 6.7 % (ref 4–6)
HCT VFR BLD AUTO: 41.1 % (ref 42–52)
HGB BLD-MCNC: 13.8 G/DL (ref 14–18)
MCH RBC QN AUTO: 30.2 PG (ref 27–31)
MCHC RBC AUTO-ENTMCNC: 33.6 G/DL (ref 33–37)
MCV RBC AUTO: 89.9 FL (ref 80–94)
PERFORMED ON: ABNORMAL
PLATELET # BLD AUTO: 246 K/UL (ref 130–400)
PMV BLD AUTO: 10.5 FL (ref 9.4–12.4)
POTASSIUM SERPL-SCNC: 3.7 MMOL/L (ref 3.5–5)
RBC # BLD AUTO: 4.57 M/UL (ref 4.7–6.1)
SODIUM SERPL-SCNC: 140 MMOL/L (ref 136–145)
TROPONIN, HIGH SENSITIVITY: 4710 NG/L (ref 0–22)
WBC # BLD AUTO: 11.1 K/UL (ref 4.8–10.8)

## 2023-12-23 PROCEDURE — 84484 ASSAY OF TROPONIN QUANT: CPT

## 2023-12-23 PROCEDURE — 81241 F5 GENE: CPT

## 2023-12-23 PROCEDURE — 81270 JAK2 GENE: CPT

## 2023-12-23 PROCEDURE — 85306 CLOT INHIBIT PROT S FREE: CPT

## 2023-12-23 PROCEDURE — 81240 F2 GENE: CPT

## 2023-12-23 PROCEDURE — 2140000000 HC CCU INTERMEDIATE R&B

## 2023-12-23 PROCEDURE — 2580000003 HC RX 258: Performed by: INTERNAL MEDICINE

## 2023-12-23 PROCEDURE — 85027 COMPLETE CBC AUTOMATED: CPT

## 2023-12-23 PROCEDURE — 86356 MONONUCLEAR CELL ANTIGEN: CPT

## 2023-12-23 PROCEDURE — 86147 CARDIOLIPIN ANTIBODY EA IG: CPT

## 2023-12-23 PROCEDURE — 81291 MTHFR GENE: CPT

## 2023-12-23 PROCEDURE — 81279 JAK2 GENE TRGT SEQUENCE ALYS: CPT

## 2023-12-23 PROCEDURE — 36415 COLL VENOUS BLD VENIPUNCTURE: CPT

## 2023-12-23 PROCEDURE — 6370000000 HC RX 637 (ALT 250 FOR IP): Performed by: INTERNAL MEDICINE

## 2023-12-23 PROCEDURE — 86146 BETA-2 GLYCOPROTEIN ANTIBODY: CPT

## 2023-12-23 PROCEDURE — 85730 THROMBOPLASTIN TIME PARTIAL: CPT

## 2023-12-23 PROCEDURE — 82962 GLUCOSE BLOOD TEST: CPT

## 2023-12-23 PROCEDURE — 85610 PROTHROMBIN TIME: CPT

## 2023-12-23 PROCEDURE — 83036 HEMOGLOBIN GLYCOSYLATED A1C: CPT

## 2023-12-23 PROCEDURE — 99223 1ST HOSP IP/OBS HIGH 75: CPT | Performed by: INTERNAL MEDICINE

## 2023-12-23 PROCEDURE — 85302 CLOT INHIBIT PROT C ANTIGEN: CPT

## 2023-12-23 PROCEDURE — 85613 RUSSELL VIPER VENOM DILUTED: CPT

## 2023-12-23 PROCEDURE — 81338 MPL GENE COMMON VARIANTS: CPT

## 2023-12-23 PROCEDURE — 81219 CALR GENE COM VARIANTS: CPT

## 2023-12-23 PROCEDURE — 80048 BASIC METABOLIC PNL TOTAL CA: CPT

## 2023-12-23 RX ADMIN — PRASUGREL 10 MG: 10 TABLET, FILM COATED ORAL at 08:23

## 2023-12-23 RX ADMIN — SODIUM CHLORIDE, PRESERVATIVE FREE 10 ML: 5 INJECTION INTRAVENOUS at 19:42

## 2023-12-23 RX ADMIN — METOPROLOL SUCCINATE 50 MG: 50 TABLET, EXTENDED RELEASE ORAL at 08:23

## 2023-12-23 RX ADMIN — SODIUM CHLORIDE, PRESERVATIVE FREE 10 ML: 5 INJECTION INTRAVENOUS at 08:23

## 2023-12-23 RX ADMIN — ASPIRIN 81 MG: 81 TABLET, COATED ORAL at 08:23

## 2023-12-23 RX ADMIN — INSULIN LISPRO 2 UNITS: 100 INJECTION, SOLUTION INTRAVENOUS; SUBCUTANEOUS at 11:55

## 2023-12-23 RX ADMIN — ACETAMINOPHEN 650 MG: 325 TABLET ORAL at 00:14

## 2023-12-23 RX ADMIN — INSULIN LISPRO 6 UNITS: 100 INJECTION, SOLUTION INTRAVENOUS; SUBCUTANEOUS at 17:31

## 2023-12-23 RX ADMIN — ATORVASTATIN CALCIUM 80 MG: 40 TABLET, FILM COATED ORAL at 19:39

## 2023-12-23 RX ADMIN — INSULIN LISPRO 2 UNITS: 100 INJECTION, SOLUTION INTRAVENOUS; SUBCUTANEOUS at 08:00

## 2023-12-23 RX ADMIN — LOSARTAN POTASSIUM 50 MG: 50 TABLET, FILM COATED ORAL at 08:23

## 2023-12-23 RX ADMIN — INSULIN LISPRO 2 UNITS: 100 INJECTION, SOLUTION INTRAVENOUS; SUBCUTANEOUS at 19:39

## 2023-12-23 ASSESSMENT — PAIN SCALES - GENERAL
PAINLEVEL_OUTOF10: 4
PAINLEVEL_OUTOF10: 0
PAINLEVEL_OUTOF10: 2
PAINLEVEL_OUTOF10: 0

## 2023-12-23 ASSESSMENT — PAIN DESCRIPTION - ONSET: ONSET: ON-GOING

## 2023-12-23 ASSESSMENT — PAIN - FUNCTIONAL ASSESSMENT: PAIN_FUNCTIONAL_ASSESSMENT: ACTIVITIES ARE NOT PREVENTED

## 2023-12-23 ASSESSMENT — PAIN DESCRIPTION - FREQUENCY: FREQUENCY: CONTINUOUS

## 2023-12-23 ASSESSMENT — PAIN DESCRIPTION - PAIN TYPE: TYPE: ACUTE PAIN

## 2023-12-23 ASSESSMENT — PAIN DESCRIPTION - LOCATION: LOCATION: CHEST

## 2023-12-23 ASSESSMENT — PAIN DESCRIPTION - ORIENTATION: ORIENTATION: MID

## 2023-12-23 ASSESSMENT — PAIN DESCRIPTION - DESCRIPTORS: DESCRIPTORS: SORE

## 2023-12-23 NOTE — PROGRESS NOTES
Patient rec'd to room 146, placed on bedside monitor.   A&O x4, denies chest pain, 3+ right radial pulse, Tband site C/D/I    Electronically signed by Aurelia Parsons RN on 12/22/2023 at 6:40 PM

## 2023-12-23 NOTE — PROGRESS NOTES
Rhythm: Normal rate and regular rhythm.   Pulmonary:      Breath sounds: Normal breath sounds.   Abdominal:      Palpations: Abdomen is soft.   Skin:     General: Skin is warm.   Neurological:      General: No focal deficit present.      Mental Status: He is alert.   Psychiatric:         Mood and Affect: Mood normal.         Behavior: Behavior normal.         Lab Data:  CBC:   Recent Labs     12/22/23  1334 12/23/23  0134   WBC 8.6 11.1*   HGB 15.4 13.8*   HCT 45.1 41.1*   MCV 89.7 89.9    246     BMP:   Recent Labs     12/22/23  1334 12/23/23  0134    140   K 4.1 3.7    105   CO2 23 23   BUN 11 10   CREATININE 0.9 0.6     LIVER PROFILE:   Recent Labs     12/22/23  1334   AST 20   ALT 23   LIPASE 39   BILITOT 0.5   ALKPHOS 70     PT/INR: No results for input(s): \"PROTIME\", \"INR\" in the last 72 hours.  APTT: No results for input(s): \"APTT\" in the last 72 hours.  BNP:  No results for input(s): \"BNP\" in the last 72 hours.  CK, CKMB, Troponin: @LABRCNT (CKTOTAL:3, CKMB:3, TROPONINI:3)@    IMAGING:  XR CHEST PORTABLE    Result Date: 12/22/2023  EXAM:  CHEST ONE VIEW, FRONTAL VIEW ONLY.  HISTORY:  Chest pain.  COMPARISON:  08/18/2023.  FINDINGS:  The heart size is normal.  There is no pulmonary vascular congestion.  The lungs are clear.  No pleural effusion or pneumothorax is seen.  No acute osseous abnormality is identified.  Since the prior study, there has been no significant interval change.   --------------------------    No acute cardiopulmonary process.      ______________________________________ Electronically signed by: KARLIE LEE M.D. Date:     12/22/2023 Time:    14:13         Assessment:  Anterior wall STEMI  Stent thrombosis of proximal LAD stent    Status post successful revascularization.  Continue aspirin and Effient high intensity statin  Toprol-XL, losartan  Appreciate heme-onc input.  Patient will need heme-onc follow-up regarding the hypercoagulation workup that was done during  admission. Echo  Cardiac rehab after discharge  Smoking cessation counseling  Patient has venous stasis of lower extremities with recent cellulitis in the left lower extremity. He reports poor healing wound on the left leg will have ID evaluate if any further antibiotics needed.   Likely discharge tomorrow    Sheila Cristobal MD, MD 12/23/2023 2:46 PM

## 2023-12-23 NOTE — CONSULTS
ONCOLOGY CONSULTATION    Patient:  Duane Saunders  YOB: 1975  Date of Service: 12/23/2023  MRN: [id number]   Primary Care Physician: Salma Collins APRN  Advance Directive: Full Code      Reason for Consult: Possible prothrombotic state    Requesting Physician: Dr. Nelson    CHIEF COMPLAINT:    Chief Complaint   Patient presents with    Chest Pain     Sent form [facility] for potential stemi         History Obtained From: The patient, nursing staff and EMR      HISTORY OF PRESENT ILLNESS:    Duane Saunders is a very pleasant 48-year-old  gentleman with a history of an LAD STEMI and stent placement in February 2023 who is readmitted in transfer from [facility] with recurrent cardiac chest pain requiring recatheterization on 12/22/2023.  The LAD was found to have in-stent thrombosis in the proximal LAD stent.  Intravascular OCT showed mallapposition of  stent with thrombus.  This was treated with thrombectomy and balloon angioplasty.  His chest pain resolved postprocedure.    Hematology consultation requested to evaluate for the possibility of a prothrombotic state.    HEMATOLOGICAL HISTORY  Duane was seen in initial hematology consultation on 12/23/2023 accompanied by Raul Lees PA-C in the ICU in consultation after cardiac events to rule out a prothrombotic state.  Duane has an ~16-bbwo-czyn history of cigarette smoking.  He does not drink alcohol    Duane had a DVT of the left leg in 2014.  His PCP in [facility] treated him for approximately 6 months with Coumadin which was subsequently discontinued without recurrence of DVT.  In February 2023, Duane experienced chest pain radiating up into his throat and was found to have LAD STEMI.  A stent was placed on Plavix.  On 12/22/2023, he was chain sawing wood.  When he got back into his car, he began experiencing similar chest pain that he had had in  recatheterization on 12/22/2023. The LAD was found to have in-stent thrombosis in the proximal LAD stent. Intravascular OCT showed mallapposition of  stent with thrombus. This was treated with thrombectomy and balloon angioplasty. His chest pain resolved postprocedure. Priscila Hurley has an ~24-gjss-rhdx history of cigarette smoking. He does not drink alcohol    Duane had a DVT of the left leg in 2014. His PCP in [address] [place] [address] treated him for approximately 6 months with Coumadin which was subsequently discontinued without recurrence of DVT. In February 2023, Duane experienced chest pain radiating up into his throat and was found to have LAD STEMI. A stent was placed on Plavix. On 12/22/2023, he was chain sawing wood. When he got back into his car, he began experiencing similar chest pain that he had had in February 2023. He quickly drove home and had his wife drive him to [facility] where he was initially evaluated and treated and sent back to [facility] for management. He required repeat cardiac catheterization on 12/22/2023. The LAD was found to have in-stent thrombosis in the proximal LAD stent. Intravascular OCT showed mallapposition of  stent with thrombus. This was treated with thrombectomy and balloon angioplasty. Plavix was discontinued and he was placed on Effient. Hematology consultation requested to evaluate for the possibility of prothrombotic state given the above history.     PLAN:  Smoking cessation discussed at length, Duane plans to quit smoking, not resumed smoking after discharge  Serological prothrombotic workup requested  Follow-up in clinic in outpatient setting with [facility] in ~ 1 month        Agnes Alcaraz MD    12/23/23  9:46 AM

## 2023-12-23 NOTE — PROGRESS NOTES
12/22/23 2139   Encounter Summary   Encounter Overview/Reason  Advance Care Planning   Service Provided For: Patient  (in ICU)   Referral/Consult From: Nurse   Support System Family members   Begin Time 2125   End Time  2140   Total Time Calculated 15 min   Advance Care Planning   Type ACP conversation   Assessment/Intervention/Outcome   Assessment Calm; Unable to assess; Other (Comment)     Received referral to assist with LW. Patient asleep at time of visit. Attending nurse Román received LW paperwork for the patient.  will follow-up to complete the LW.      Electronically signed by Alexander Rodriguez on 12/22/2023 at 9:45 PM

## 2023-12-23 NOTE — PROGRESS NOTES
4 Eyes Skin Assessment     NAME:  Jennifer Mccarty  YOB: 1975  MEDICAL RECORD NUMBER:  [id number]    The patient is being assessed for  Admission    I agree that at least one RN has performed a thorough Head to Toe Skin Assessment on the patient. ALL assessment sites listed below have been assessed. Areas assessed by both nurses:    Head, Face, Ears, Shoulders, Back, Chest, Arms, Elbows, Hands, Sacrum. Buttock, Coccyx, Ischium, and Legs. Feet and Heels        Does the Patient have a Wound?  No noted wound(s)       Brian Prevention initiated by RN: No  Wound Care Orders initiated by RN: No    Pressure Injury (Stage 3,4, Unstageable, DTI, NWPT, and Complex wounds) if present, place Wound referral order by RN under : No    New Ostomies, if present place, Ostomy referral order under : No     Nurse 1 eSignature: Electronically signed by Justin Cornell RN on 12/23/23 at 2:59 AM CST    **SHARE this note so that the co-signing nurse can place an eSignature**    Nurse 2 eSignature: Electronically signed by Tian Peacock RN on 12/23/23 at 3:00 AM CST

## 2023-12-24 VITALS
BODY MASS INDEX: 33.39 KG/M2 | OXYGEN SATURATION: 96 % | HEIGHT: 75 IN | DIASTOLIC BLOOD PRESSURE: 67 MMHG | SYSTOLIC BLOOD PRESSURE: 109 MMHG | WEIGHT: 268.56 LBS | RESPIRATION RATE: 16 BRPM | TEMPERATURE: 97.7 F | HEART RATE: 78 BPM

## 2023-12-24 PROBLEM — I82.90 THROMBOSIS: Status: ACTIVE | Noted: 2023-12-24

## 2023-12-24 LAB
GLUCOSE BLD-MCNC: 171 MG/DL (ref 70–99)
GLUCOSE BLD-MCNC: 215 MG/DL (ref 70–99)
PERFORMED ON: ABNORMAL
PERFORMED ON: ABNORMAL

## 2023-12-24 PROCEDURE — 93306 TTE W/DOPPLER COMPLETE: CPT

## 2023-12-24 PROCEDURE — 99231 SBSQ HOSP IP/OBS SF/LOW 25: CPT | Performed by: INTERNAL MEDICINE

## 2023-12-24 PROCEDURE — 6370000000 HC RX 637 (ALT 250 FOR IP): Performed by: INTERNAL MEDICINE

## 2023-12-24 PROCEDURE — 2580000003 HC RX 258: Performed by: INTERNAL MEDICINE

## 2023-12-24 PROCEDURE — APPSS15 APP SPLIT SHARED TIME 0-15 MINUTES: Performed by: PHYSICIAN ASSISTANT

## 2023-12-24 PROCEDURE — 82962 GLUCOSE BLOOD TEST: CPT

## 2023-12-24 RX ORDER — LOSARTAN POTASSIUM 50 MG/1
50 TABLET ORAL DAILY
Qty: 30 TABLET | Refills: 3 | Status: SHIPPED | OUTPATIENT
Start: 2023-12-25

## 2023-12-24 RX ORDER — METOPROLOL SUCCINATE 50 MG/1
50 TABLET, EXTENDED RELEASE ORAL DAILY
Qty: 30 TABLET | Refills: 3 | Status: SHIPPED | OUTPATIENT
Start: 2023-12-25

## 2023-12-24 RX ORDER — GLIPIZIDE 5 MG/1
10 TABLET ORAL 2 TIMES DAILY
Status: DISCONTINUED | OUTPATIENT
Start: 2023-12-24 | End: 2023-12-24 | Stop reason: HOSPADM

## 2023-12-24 RX ADMIN — GLIPIZIDE 10 MG: 5 TABLET ORAL at 08:37

## 2023-12-24 RX ADMIN — PRASUGREL 10 MG: 10 TABLET, FILM COATED ORAL at 08:30

## 2023-12-24 RX ADMIN — METOPROLOL SUCCINATE 50 MG: 50 TABLET, EXTENDED RELEASE ORAL at 08:30

## 2023-12-24 RX ADMIN — ASPIRIN 81 MG: 81 TABLET, COATED ORAL at 08:30

## 2023-12-24 RX ADMIN — INSULIN LISPRO 2 UNITS: 100 INJECTION, SOLUTION INTRAVENOUS; SUBCUTANEOUS at 08:30

## 2023-12-24 RX ADMIN — LOSARTAN POTASSIUM 50 MG: 50 TABLET, FILM COATED ORAL at 08:30

## 2023-12-24 RX ADMIN — METFORMIN HYDROCHLORIDE 500 MG: 500 TABLET ORAL at 08:30

## 2023-12-24 RX ADMIN — SODIUM CHLORIDE, PRESERVATIVE FREE 10 ML: 5 INJECTION INTRAVENOUS at 08:31

## 2023-12-24 NOTE — DISCHARGE SUMMARY
Physician Discharge Summary     Patient ID:  Reagan Keen  [id number]  87 y.o.  1975    Admit date: 12/22/2023    Discharge date and time: No discharge date for patient encounter. Admitting Physician: Monique Harris MD     Discharge Physician: Viviane Sanchez    Admission Diagnoses: STEMI (ST elevation myocardial infarction) [facility]) [I21.3]  NSTEMI (non-ST elevated myocardial infarction) [facility]) [I21.4]    Discharge Diagnoses: STEMI    Admission Condition: good    Discharged Condition: good    Indication for Admission: LAD STEMI    Hospital Course: Patient presented as a transfer from [facility] emergency room with EKG consistent with LAD STEMI. EKG on arrival showed resolution of ST segment changes and patient's chest pain started to improve, so suspected there was some reperfusion occurring. However after few hours patient's chest pain started to get worse again and decided to take him urgently to the cardiac Cath Lab. The proximal LAD was found to be occluded with in-stent thrombosis. He underwent successful PCI with thrombectomy and balloon angioplasty of the prior stent. No additional stenting was needed. Postprocedure he was monitored and optimized on medical therapy. Hematology was also consulted for evaluation for hypercoagulation state as the patient does have a remote history of DVT as well. Workup has been sent and he will need to follow-up with hematology in 1 month. Also recommend ID follow-up due to recent cellulitis of his left leg.     Consults: ID and hematology/oncology    Significant Diagnostic Studies: angiography:     Outstanding Order Results       Date and Time Order Name Status Description    12/23/2023  8:40 AM PNH, High Sensitivity, RBC and WBC In process     12/23/2023  8:40 AM JAK2 V617F Mutation Analysis, Qual rflx CALR/Exon 12-15/MPL In process     12/23/2023  8:40 AM Cardiolipin Antibodies IgG & IgM In process     12/23/2023  8:40 AM Lupus Anticoagulant In process

## 2023-12-25 LAB
CARDIOLIPIN IGG SER IA-ACNC: 14 GPL
CARDIOLIPIN IGM SER IA-ACNC: <10 MPL

## 2023-12-26 LAB
B2 GLYCOPROT1 IGG SERPL IA-ACNC: <10 SGU
B2 GLYCOPROT1 IGM SERPL IA-ACNC: <10 SMU
CD59 DEFICIENT GRANULOCYTES NFR BLD: <0.008 % (ref 0–0.01)
CD59 DEFICIENT MONOCYTES NFR BLD: <0.2 % (ref 0–0.2)
EKG P AXIS: 71 DEGREES
EKG P AXIS: 76 DEGREES
EKG P-R INTERVAL: 152 MS
EKG P-R INTERVAL: 158 MS
EKG Q-T INTERVAL: 402 MS
EKG Q-T INTERVAL: 402 MS
EKG QRS DURATION: 92 MS
EKG QRS DURATION: 94 MS
EKG QTC CALCULATION (BAZETT): 440 MS
EKG QTC CALCULATION (BAZETT): 447 MS
EKG T AXIS: 62 DEGREES
EKG T AXIS: 72 DEGREES
OBSERVATION IMP: NOT DETECTED
PROT C AG ACT/NOR PPP IA: >95 % (ref 63–153)
PROT S FREE AG ACT/NOR PPP IA: 116 % (ref 74–147)
RBC CD59 DEFICIENT NFR: <0.008 % (ref 0–0.01)

## 2023-12-27 ENCOUNTER — TELEPHONE (OUTPATIENT)
Dept: INTERNAL MEDICINE | Age: 48
End: 2023-12-27

## 2023-12-27 LAB
MTHFR C.1298A>C GENO BLD/T: NEGATIVE
MTHFR C.677C>T GENO BLD/T: NORMAL
MTHFR GENE MUT ANL BLD/T: NORMAL
SPECIMEN SOURCE: NORMAL

## 2023-12-27 NOTE — TELEPHONE ENCOUNTER
Care Transitions Initial Follow Up Call    Outreach made within 2 business days of discharge: Yes    Patient: Duane Saunders   Patient : 1975   MRN: [id number]   Reason for Admission: STEMI  Discharge Date: 23       Spoke with: Duane Saunders    Discharge department/facility: [facility]    TCM Interactive Patient Contact:  Was patient able to fill all prescriptions: Yes  Was patient instructed to bring all medications to the follow-up visit: Yes  Is patient taking all medications as directed in the discharge summary? Yes  Does patient understand their discharge instructions: Yes  Does patient have questions or concerns that need addressed prior to 7-14 day follow up office visit: no    The patient denies any chest pain, dizziness,and arm pain. He reports normal bowel, bladder and appetite. Mr. Saunders stated \" I was admitted into [facility] three weeks ago with a staff infection in my left leg.\" His left leg is red, swollen and hot to touch.     Scheduled appointment with PCP within 7-14 days    Follow Up  Future Appointments   Date Time Provider Department Center   2023  2:00 PM Salma Collins APRN [facility]   2024  1:30 PM Raul Lees PA-C N [facility]   2024  1:30 PM SCHEDULE, [facility] MED ONC [facility] Julissa [facility]LS[facility]   2024 10:15 AM Salma Collins APRN [facility] [place]

## 2023-12-28 LAB
APTT IMM NP PPP: ABNORMAL SEC (ref 32–48)
APTT P HEP NEUT PPP: ABNORMAL SEC (ref 32–48)
CONFIRM APTT STACLOT: ABNORMAL
DRVVT SCREEN TO CONFIRM RATIO: ABNORMAL RATIO
F2 C.20210G>A GENO BLD/T: NEGATIVE
F5 P.R506Q BLD/T QL: NEGATIVE
LA 3 SCREEN W REFLEX-IMP: ABNORMAL
LA NT DPL PPP QL: ABNORMAL
MIXING DRVVT: ABNORMAL SEC (ref 33–44)
PROTHROMBIN TIME: 12.1 SEC (ref 12–15.5)
REPTILASE TIME: ABNORMAL SEC
SCREEN APTT: 32 SEC (ref 32–48)
SCREEN DRVVT: 28 SEC (ref 33–44)
SPECIMEN SOURCE: NORMAL
SPECIMEN SOURCE: NORMAL
THROMBIN TIME: ABNORMAL SEC (ref 14.7–19.5)

## 2023-12-29 ENCOUNTER — OFFICE VISIT (OUTPATIENT)
Dept: INTERNAL MEDICINE | Age: 48
End: 2023-12-29
Payer: MEDICAID

## 2023-12-29 VITALS
DIASTOLIC BLOOD PRESSURE: 78 MMHG | HEART RATE: 88 BPM | OXYGEN SATURATION: 99 % | WEIGHT: 271 LBS | SYSTOLIC BLOOD PRESSURE: 140 MMHG | BODY MASS INDEX: 33.87 KG/M2

## 2023-12-29 DIAGNOSIS — L03.116 CELLULITIS OF LEFT LOWER EXTREMITY: ICD-10-CM

## 2023-12-29 DIAGNOSIS — E78.5 HYPERLIPIDEMIA, UNSPECIFIED HYPERLIPIDEMIA TYPE: ICD-10-CM

## 2023-12-29 DIAGNOSIS — E11.9 TYPE 2 DIABETES MELLITUS WITHOUT COMPLICATION, WITHOUT LONG-TERM CURRENT USE OF INSULIN (HCC): ICD-10-CM

## 2023-12-29 DIAGNOSIS — E11.9 TYPE 2 DIABETES MELLITUS WITHOUT COMPLICATION, UNSPECIFIED WHETHER LONG TERM INSULIN USE (HCC): ICD-10-CM

## 2023-12-29 DIAGNOSIS — A49.02 MRSA INFECTION: ICD-10-CM

## 2023-12-29 DIAGNOSIS — F17.200 SMOKER: ICD-10-CM

## 2023-12-29 DIAGNOSIS — Z71.6 ENCOUNTER FOR SMOKING CESSATION COUNSELING: ICD-10-CM

## 2023-12-29 DIAGNOSIS — I21.4 NSTEMI (NON-ST ELEVATION MYOCARDIAL INFARCTION) (HCC): ICD-10-CM

## 2023-12-29 DIAGNOSIS — S81.802D OPEN WOUND OF LEFT LOWER LEG, SUBSEQUENT ENCOUNTER: ICD-10-CM

## 2023-12-29 DIAGNOSIS — Z09 HOSPITAL DISCHARGE FOLLOW-UP: Primary | ICD-10-CM

## 2023-12-29 LAB
ALBUMIN SERPL-MCNC: 4.2 G/DL (ref 3.5–5.2)
ALP SERPL-CCNC: 68 U/L (ref 40–130)
ALT SERPL-CCNC: 26 U/L (ref 5–41)
ANION GAP SERPL CALCULATED.3IONS-SCNC: 10 MMOL/L (ref 7–19)
AST SERPL-CCNC: 18 U/L (ref 5–40)
BASOPHILS # BLD: 0.1 K/UL (ref 0–0.2)
BASOPHILS NFR BLD: 1.8 % (ref 0–1)
BILIRUB SERPL-MCNC: 0.6 MG/DL (ref 0.2–1.2)
BUN SERPL-MCNC: 9 MG/DL (ref 6–20)
CALCIUM SERPL-MCNC: 9.3 MG/DL (ref 8.6–10)
CHLORIDE SERPL-SCNC: 102 MMOL/L (ref 98–111)
CO2 SERPL-SCNC: 28 MMOL/L (ref 22–29)
CREAT SERPL-MCNC: 0.8 MG/DL (ref 0.5–1.2)
EOSINOPHIL # BLD: 0.3 K/UL (ref 0–0.6)
EOSINOPHIL NFR BLD: 5 % (ref 0–5)
ERYTHROCYTE [DISTWIDTH] IN BLOOD BY AUTOMATED COUNT: 13.5 % (ref 11.5–14.5)
GLUCOSE SERPL-MCNC: 217 MG/DL (ref 74–109)
HBA1C MFR BLD: 6.9 % (ref 4–6)
HCT VFR BLD AUTO: 43.3 % (ref 42–52)
HGB BLD-MCNC: 14.4 G/DL (ref 14–18)
IMM GRANULOCYTES # BLD: 0 K/UL
LYMPHOCYTES NFR BLD: 27.8 % (ref 20–40)
MCH RBC QN AUTO: 30.6 PG (ref 27–31)
MCHC RBC AUTO-ENTMCNC: 33.3 G/DL (ref 33–37)
MONOCYTES # BLD: 0.4 K/UL (ref 0–0.9)
MONOCYTES NFR BLD: 6.7 % (ref 0–10)
NEUTROPHILS # BLD: 3.8 K/UL (ref 1.5–7.5)
NEUTS SEG NFR BLD: 58.4 % (ref 50–65)
PLATELET # BLD AUTO: 290 K/UL (ref 130–400)
PMV BLD AUTO: 11 FL (ref 9.4–12.4)
POTASSIUM SERPL-SCNC: 3.7 MMOL/L (ref 3.5–5)
PROT SERPL-MCNC: 7.1 G/DL (ref 6.6–8.7)
RBC # BLD AUTO: 4.7 M/UL (ref 4.7–6.1)
SODIUM SERPL-SCNC: 140 MMOL/L (ref 136–145)
WBC # BLD AUTO: 6.6 K/UL (ref 4.8–10.8)

## 2023-12-29 PROCEDURE — 99214 OFFICE O/P EST MOD 30 MIN: CPT | Performed by: NURSE PRACTITIONER

## 2023-12-29 PROCEDURE — 1111F DSCHRG MED/CURRENT MED MERGE: CPT | Performed by: NURSE PRACTITIONER

## 2023-12-29 RX ORDER — VARENICLINE TARTRATE 0.5 (11)-1
KIT ORAL
Qty: 1 EACH | Refills: 0 | Status: SHIPPED | OUTPATIENT
Start: 2023-12-29

## 2024-01-02 ENCOUNTER — APPOINTMENT (OUTPATIENT)
Dept: GENERAL RADIOLOGY | Age: 49
End: 2024-01-02
Payer: MEDICAID

## 2024-01-02 ENCOUNTER — HOSPITAL ENCOUNTER (EMERGENCY)
Age: 49
Discharge: HOME | End: 2024-01-03
Attending: EMERGENCY MEDICINE
Payer: MEDICAID

## 2024-01-02 DIAGNOSIS — I25.2 HISTORY OF MI (MYOCARDIAL INFARCTION): ICD-10-CM

## 2024-01-02 DIAGNOSIS — R79.89 ELEVATED BRAIN NATRIURETIC PEPTIDE (BNP) LEVEL: ICD-10-CM

## 2024-01-02 DIAGNOSIS — R06.02 SHORTNESS OF BREATH: Primary | ICD-10-CM

## 2024-01-02 LAB
ALBUMIN SERPL-MCNC: 4.4 G/DL (ref 3.5–5.2)
ALP SERPL-CCNC: 70 U/L (ref 40–130)
ALT SERPL-CCNC: 25 U/L (ref 5–41)
ANION GAP SERPL CALCULATED.3IONS-SCNC: 15 MMOL/L (ref 7–19)
AST SERPL-CCNC: 14 U/L (ref 5–40)
B PARAP IS1001 DNA NPH QL NAA+NON-PROBE: NOT DETECTED
B PERT.PT PRMT NPH QL NAA+NON-PROBE: NOT DETECTED
BASOPHILS # BLD: 0.1 K/UL (ref 0–0.2)
BASOPHILS NFR BLD: 1.3 % (ref 0–1)
BILIRUB SERPL-MCNC: 0.4 MG/DL (ref 0.2–1.2)
BNP BLD-MCNC: 579 PG/ML (ref 0–124)
BUN SERPL-MCNC: 18 MG/DL (ref 6–20)
C PNEUM DNA NPH QL NAA+NON-PROBE: NOT DETECTED
CALCIUM SERPL-MCNC: 9.9 MG/DL (ref 8.6–10)
CHLORIDE SERPL-SCNC: 101 MMOL/L (ref 98–111)
CO2 SERPL-SCNC: 22 MMOL/L (ref 22–29)
CREAT SERPL-MCNC: 1.1 MG/DL (ref 0.5–1.2)
D DIMER PPP FEU-MCNC: 0.33 UG/ML FEU (ref 0–0.48)
EOSINOPHIL # BLD: 0.2 K/UL (ref 0–0.6)
EOSINOPHIL NFR BLD: 2.2 % (ref 0–5)
ERYTHROCYTE [DISTWIDTH] IN BLOOD BY AUTOMATED COUNT: 13.3 % (ref 11.5–14.5)
FLUAV RNA NPH QL NAA+NON-PROBE: NOT DETECTED
FLUBV RNA NPH QL NAA+NON-PROBE: NOT DETECTED
GLUCOSE SERPL-MCNC: 188 MG/DL (ref 74–109)
HADV DNA NPH QL NAA+NON-PROBE: NOT DETECTED
HCOV 229E RNA NPH QL NAA+NON-PROBE: NOT DETECTED
HCOV HKU1 RNA NPH QL NAA+NON-PROBE: NOT DETECTED
HCOV NL63 RNA NPH QL NAA+NON-PROBE: NOT DETECTED
HCOV OC43 RNA NPH QL NAA+NON-PROBE: NOT DETECTED
HCT VFR BLD AUTO: 51 % (ref 42–52)
HGB BLD-MCNC: 17.2 G/DL (ref 14–18)
HMPV RNA NPH QL NAA+NON-PROBE: NOT DETECTED
HPIV1 RNA NPH QL NAA+NON-PROBE: NOT DETECTED
HPIV2 RNA NPH QL NAA+NON-PROBE: NOT DETECTED
HPIV3 RNA NPH QL NAA+NON-PROBE: NOT DETECTED
HPIV4 RNA NPH QL NAA+NON-PROBE: NOT DETECTED
IMM GRANULOCYTES # BLD: 0 K/UL
INR PPP: 0.95 (ref 0.88–1.18)
LYMPHOCYTES # BLD: 3 K/UL (ref 1.1–4.5)
LYMPHOCYTES NFR BLD: 29.9 % (ref 20–40)
M PNEUMO DNA NPH QL NAA+NON-PROBE: NOT DETECTED
MAGNESIUM SERPL-MCNC: 2.2 MG/DL (ref 1.6–2.6)
MCH RBC QN AUTO: 30.1 PG (ref 27–31)
MCHC RBC AUTO-ENTMCNC: 33.7 G/DL (ref 33–37)
MCV RBC AUTO: 89.2 FL (ref 80–94)
MONOCYTES # BLD: 0.7 K/UL (ref 0–0.9)
MONOCYTES NFR BLD: 6.9 % (ref 0–10)
NEUTROPHILS # BLD: 6 K/UL (ref 1.5–7.5)
NEUTS SEG NFR BLD: 59.5 % (ref 50–65)
PLATELET # BLD AUTO: 403 K/UL (ref 130–400)
PMV BLD AUTO: 10.4 FL (ref 9.4–12.4)
POTASSIUM SERPL-SCNC: 4.2 MMOL/L (ref 3.5–5)
PROCALCITONIN: 0.06 NG/ML (ref 0–0.09)
PROT SERPL-MCNC: 7.6 G/DL (ref 6.6–8.7)
PROTHROMBIN TIME: 12.4 SEC (ref 12–14.6)
RBC # BLD AUTO: 5.72 M/UL (ref 4.7–6.1)
RSV RNA NPH QL NAA+NON-PROBE: NOT DETECTED
RV+EV RNA NPH QL NAA+NON-PROBE: NOT DETECTED
SARS-COV-2 RNA NPH QL NAA+NON-PROBE: NOT DETECTED
SODIUM SERPL-SCNC: 138 MMOL/L (ref 136–145)
TROPONIN, HIGH SENSITIVITY: 19 NG/L (ref 0–22)
WBC # BLD AUTO: 10.1 K/UL (ref 4.8–10.8)

## 2024-01-02 PROCEDURE — 83880 ASSAY OF NATRIURETIC PEPTIDE: CPT

## 2024-01-02 PROCEDURE — 99285 EMERGENCY DEPT VISIT HI MDM: CPT

## 2024-01-02 PROCEDURE — 96374 THER/PROPH/DIAG INJ IV PUSH: CPT

## 2024-01-02 PROCEDURE — 36415 COLL VENOUS BLD VENIPUNCTURE: CPT

## 2024-01-02 PROCEDURE — 71045 X-RAY EXAM CHEST 1 VIEW: CPT

## 2024-01-02 PROCEDURE — 0202U NFCT DS 22 TRGT SARS-COV-2: CPT

## 2024-01-02 PROCEDURE — 85025 COMPLETE CBC W/AUTO DIFF WBC: CPT

## 2024-01-02 PROCEDURE — 93005 ELECTROCARDIOGRAM TRACING: CPT | Performed by: EMERGENCY MEDICINE

## 2024-01-02 PROCEDURE — 80053 COMPREHEN METABOLIC PANEL: CPT

## 2024-01-02 PROCEDURE — 84484 ASSAY OF TROPONIN QUANT: CPT

## 2024-01-02 PROCEDURE — 84145 PROCALCITONIN (PCT): CPT

## 2024-01-02 PROCEDURE — 85379 FIBRIN DEGRADATION QUANT: CPT

## 2024-01-02 PROCEDURE — 6360000002 HC RX W HCPCS: Performed by: EMERGENCY MEDICINE

## 2024-01-02 PROCEDURE — 83735 ASSAY OF MAGNESIUM: CPT

## 2024-01-02 PROCEDURE — 85610 PROTHROMBIN TIME: CPT

## 2024-01-02 RX ORDER — FUROSEMIDE 10 MG/ML
40 INJECTION INTRAMUSCULAR; INTRAVENOUS ONCE
Status: COMPLETED | OUTPATIENT
Start: 2024-01-02 | End: 2024-01-02

## 2024-01-02 RX ADMIN — FUROSEMIDE 40 MG: 10 INJECTION, SOLUTION INTRAMUSCULAR; INTRAVENOUS at 23:27

## 2024-01-02 ASSESSMENT — PAIN - FUNCTIONAL ASSESSMENT: PAIN_FUNCTIONAL_ASSESSMENT: NONE - DENIES PAIN

## 2024-01-03 VITALS
OXYGEN SATURATION: 97 % | RESPIRATION RATE: 11 BRPM | DIASTOLIC BLOOD PRESSURE: 79 MMHG | HEART RATE: 78 BPM | TEMPERATURE: 97.3 F | SYSTOLIC BLOOD PRESSURE: 147 MMHG

## 2024-01-03 LAB
EKG P AXIS: 81 DEGREES
EKG P-R INTERVAL: 120 MS
EKG Q-T INTERVAL: 354 MS
EKG QRS DURATION: 86 MS
EKG QTC CALCULATION (BAZETT): 418 MS
EKG T AXIS: 103 DEGREES

## 2024-01-03 PROCEDURE — 93010 ELECTROCARDIOGRAM REPORT: CPT | Performed by: INTERNAL MEDICINE

## 2024-01-03 RX ORDER — FUROSEMIDE 20 MG/1
20 TABLET ORAL DAILY
Qty: 30 TABLET | Refills: 0 | Status: SHIPPED | OUTPATIENT
Start: 2024-01-03

## 2024-01-03 NOTE — ED PROVIDER NOTES
[facility] EMERGENCY DEPT  EMERGENCY DEPARTMENT ENCOUNTER      Pt Name: Duane Saunders  MRN: [id number]  Birthdate 1975  Date of evaluation: 1/2/2024  Provider: Trace Giraldo Jr, MD    CHIEF COMPLAINT       Chief Complaint   Patient presents with    Illness    Shortness of Breath     Pt had MI on 12/22/23 repaired previous stent. Pt states SOA general malaise weakness discharged on 12/24/23             HISTORY OF PRESENT ILLNESS   (Location/Symptom, Timing/Onset,Context/Setting, Quality, Duration, Modifying Factors, Severity)  Note limiting factors.   Duane Saunders is a 48 y.o. male who presents to the emergency department for evaluation after having shortness of breath, generalized malaise, and bodyaches that started today and gradually worsened throughout the day.  Patient was hospitalized here recently with ST elevation MI.  Patient was taken to the Cath Lab and found to have proximal LAD occlusion with in-stent thrombosis.  Underwent PCI with successful thrombectomy and balloon angioplasty of prior stent.  Patient on aspirin and Effient.  Remote history of DVT.    Denies any associated chest pain, cough, congestion, runny nose, nausea, vomiting, or other specific symptoms today.    HPI    NursingNotes were reviewed.    REVIEW OF SYSTEMS    (2-9 systems for level 4, 10 or more for level 5)     Review of Systems    A complete review of systems was performed and is negative except as noted above in the HPI.       PAST MEDICAL HISTORY     Past Medical History:   Diagnosis Date    Class 1 obesity     Diabetes mellitus (HCC)     DVT (deep venous thrombosis) (HCC) 10/27/2014    Left popliteal vein     Hyperlipidemia     Hypertension     Tobacco abuse          SURGICAL HISTORY       Past Surgical History:   Procedure Laterality Date    APPENDECTOMY      at around age 4-5 years    CAROTID STENT  02/2023    CHOLECYSTECTOMY, LAPAROSCOPIC  2005         CURRENT MEDICATIONS       Discharge Medication List as of 1/3/2024 12:40 AM

## 2024-01-04 ENCOUNTER — HOSPITAL ENCOUNTER (OUTPATIENT)
Dept: WOUND CARE | Age: 49
Discharge: HOME | End: 2024-01-04
Payer: MEDICAID

## 2024-01-04 VITALS
DIASTOLIC BLOOD PRESSURE: 87 MMHG | HEIGHT: 75 IN | TEMPERATURE: 96.8 F | WEIGHT: 271 LBS | RESPIRATION RATE: 18 BRPM | HEART RATE: 95 BPM | SYSTOLIC BLOOD PRESSURE: 133 MMHG | BODY MASS INDEX: 33.69 KG/M2

## 2024-01-04 DIAGNOSIS — L97.922 SKIN ULCER OF LEFT LOWER LEG WITH FAT LAYER EXPOSED (HCC): ICD-10-CM

## 2024-01-04 DIAGNOSIS — I87.2 VENOUS INSUFFICIENCY: ICD-10-CM

## 2024-01-04 DIAGNOSIS — R60.9 SWELLING: ICD-10-CM

## 2024-01-04 DIAGNOSIS — I73.9 PVD (PERIPHERAL VASCULAR DISEASE) (HCC): Primary | ICD-10-CM

## 2024-01-04 LAB
CALR EXON 9 MUT ANL BLD/T: NORMAL
CITATION REF LAB TEST: NORMAL
JAK2 GENE MUT ANL BLD/T: NORMAL
JAK2 P.V617F BLD/T QL: NORMAL
LAB DIRECTOR NAME PROVIDER: NORMAL
MPL GENE MUT TESTED MAR: NORMAL
REF LAB TEST METHOD: NORMAL
REFLEX: NORMAL
TEST PERFORMANCE INFO SPEC: NORMAL

## 2024-01-04 PROCEDURE — 99214 OFFICE O/P EST MOD 30 MIN: CPT

## 2024-01-04 PROCEDURE — 97597 DBRDMT OPN WND 1ST 20 CM/<: CPT

## 2024-01-04 PROCEDURE — 97597 DBRDMT OPN WND 1ST 20 CM/<: CPT | Performed by: NURSE PRACTITIONER

## 2024-01-04 PROCEDURE — 99213 OFFICE O/P EST LOW 20 MIN: CPT | Performed by: NURSE PRACTITIONER

## 2024-01-04 RX ORDER — LIDOCAINE HYDROCHLORIDE 20 MG/ML
JELLY TOPICAL ONCE
OUTPATIENT
Start: 2024-01-04 | End: 2024-01-04

## 2024-01-04 RX ORDER — LIDOCAINE 40 MG/G
CREAM TOPICAL ONCE
OUTPATIENT
Start: 2024-01-04 | End: 2024-01-04

## 2024-01-04 RX ORDER — CLOBETASOL PROPIONATE 0.5 MG/G
OINTMENT TOPICAL ONCE
OUTPATIENT
Start: 2024-01-04 | End: 2024-01-04

## 2024-01-04 RX ORDER — LIDOCAINE 50 MG/G
OINTMENT TOPICAL ONCE
OUTPATIENT
Start: 2024-01-04 | End: 2024-01-04

## 2024-01-04 RX ORDER — TRIAMCINOLONE ACETONIDE 1 MG/G
OINTMENT TOPICAL ONCE
OUTPATIENT
Start: 2024-01-04 | End: 2024-01-04

## 2024-01-04 RX ORDER — GENTAMICIN SULFATE 1 MG/G
OINTMENT TOPICAL ONCE
OUTPATIENT
Start: 2024-01-04 | End: 2024-01-04

## 2024-01-04 RX ORDER — BACITRACIN ZINC AND POLYMYXIN B SULFATE 500; 1000 [USP'U]/G; [USP'U]/G
OINTMENT TOPICAL ONCE
OUTPATIENT
Start: 2024-01-04 | End: 2024-01-04

## 2024-01-04 RX ORDER — LIDOCAINE HYDROCHLORIDE 40 MG/ML
SOLUTION TOPICAL ONCE
OUTPATIENT
Start: 2024-01-04 | End: 2024-01-04

## 2024-01-04 RX ORDER — GINSENG 100 MG
CAPSULE ORAL ONCE
OUTPATIENT
Start: 2024-01-04 | End: 2024-01-04

## 2024-01-04 RX ORDER — BETAMETHASONE DIPROPIONATE 0.5 MG/G
CREAM TOPICAL ONCE
OUTPATIENT
Start: 2024-01-04 | End: 2024-01-04

## 2024-01-04 RX ORDER — SODIUM CHLOR/HYPOCHLOROUS ACID 0.033 %
SOLUTION, IRRIGATION IRRIGATION ONCE
OUTPATIENT
Start: 2024-01-04 | End: 2024-01-04

## 2024-01-04 RX ORDER — IBUPROFEN 200 MG
TABLET ORAL ONCE
OUTPATIENT
Start: 2024-01-04 | End: 2024-01-04

## 2024-01-04 ASSESSMENT — VISUAL ACUITY: OU: 1

## 2024-01-04 NOTE — PATIENT INSTRUCTIONS
[facility] Wound Care and Hyperbaric Oxygen Therapy   Physician Orders and Discharge Instructions  [address] [place] [address] Drive  [address]  [place] [address]  Telephone: [phone]      FAX [phone]    NAME:  Duane Saunders  YOB: 1975  MEDICAL RECORD NUMBER:  [id number]  DATE:  1/4/2024    Discharge condition: Stable    Discharge to: Home    Left via:Private automobile    Accompanied by:  spouse    ECF/HHA: [facility]    We will call with the date and time of test and your follow up appointment at the Wound Care Center.      You are scheduled for Lower extremity venous insufficiency study is scheduled  on  Monday 1/8/24 @ 9am, register in [address], downstairs in this building, then proceed to [address] for the test.    Please do not smoke for 2 hours before this test.      Dressing Orders:  Left lower leg: Wash with soap and water. Place Aquacel Ag to wound bed, cover with silicone border dressing. Change dressing daily.      Treatment Orders:  Protein rich diet (unless restricted by your physician); Multivitamin daily; Elevate legs above the level of your heart when sitting 3-4 times daily for at least one hour each time, avoid standing for long periods of time.      [facility] follow up visit _____________Monday 1/8/24________________  (Please note your next appointment above and if you are unable to keep, kindly give a 24 hour notice. Thank you.)          If you experience any of the following, please call the Wound Care Center during business hours:    * Increase in Pain  * Temperature over 101  * Increase in drainage from your wound  * Drainage with a foul odor  * Bleeding  * Increase in swelling  * Need for compression bandage changes due to slippage, breakthrough drainage.    If you need medical attention outside of the business hours of the Wound Care Centers please contact your PCP or go to the nearest emergency room.

## 2024-01-04 NOTE — PLAN OF CARE
Returned voice mail message from [employer] re: had questions about supply order. Discussed with Jennifer LORD, Dixie Sap silicone border dressing for cover dressing and Kendra for compression stockings. Spoke with representative Domingo, he verbalized understanding.

## 2024-01-04 NOTE — PLAN OF CARE
Problem: Chronic Conditions and Co-morbidities  Goal: Patient's chronic conditions and co-morbidity symptoms are monitored and maintained or improved  Outcome: Progressing     Problem: ABCDS Injury Assessment  Goal: Absence of physical injury  Outcome: Progressing     Problem: Wound:  Goal: Will show signs of wound healing; wound closure and no evidence of infection  Description: Will show signs of wound healing; wound closure and no evidence of infection  Outcome: Progressing     Problem: Venous:  Goal: Signs of wound healing will improve  Description: Signs of wound healing will improve  Outcome: Progressing     Problem: Smoking cessation:  Goal: Ability to formulate a plan to maintain a tobacco-free life will be supported  Description: Ability to formulate a plan to maintain a tobacco-free life will be supported  Outcome: Progressing     Problem: Compression therapy:  Goal: Will be free from complications associated with compression therapy  Description: Will be free from complications associated with compression therapy  Outcome: Progressing     Problem: Blood Glucose:  Goal: Ability to maintain appropriate glucose levels will improve  Description: Ability to maintain appropriate glucose levels will improve  Outcome: Progressing

## 2024-01-04 NOTE — PROGRESS NOTES
Patient Care Team:  Salma Collins APRN as PCP - General (Nurse Practitioner)  Salma Collins APRN as PCP - Empaneled Provider    TODAY'S DATE:  1/4/2024     HISTORY of PRESENTILLNESS HPI   Duane Saunders is a 48 y.o. male who presents today for wound evaluation.  He reports he developed a wound on left leg.This started 1 week(s) ago. He believes this is not healing. He has been applying antibiotic ointment. He has not had  fever orchills. He has a history of diabetes mellitus (HGBA1c 6.9) and venous disease.  Wound Type:venous  Wound Location: left leg  Modifying factors:edema, venous stasis, and diabetes    Patient Active Problem List   Diagnosis Code    DVT (deep venous thrombosis) ([facility]) I82.409    Chest pain R07.9    NSTEMI (non-ST elevation myocardial infarction) ([facility]) I21.4    Diabetes ([facility]) E11.9    STEMI (ST elevation myocardial infarction) ([facility]) I21.3    Thrombosis I82.90    PVD (peripheral vascular disease) ([facility]) I73.9    Swelling R60.9    Venous insufficiency I87.2    Skin ulcer of left lower leg with fat layer exposed ([facility]) L97.922       Duane Saunders is a 48 y.o. male with the following history reviewed and recorded in [facility]:    Current Outpatient Medications   Medication Sig Dispense Refill    furosemide (LASIX) 20 MG tablet Take 1 tablet by mouth daily 30 tablet 0    Varenicline Tartrate, Starter, (CHANTIX STARTING MONTH BRYAN) 0.5 MG X 11 & 1 MG X 42 TBPK Take per pack directions 1 each 0    metoprolol succinate (TOPROL XL) 50 MG extended release tablet Take 1 tablet by mouth daily 30 tablet 3    losartan (COZAAR) 50 MG tablet Take 1 tablet by mouth daily 30 tablet 3    ASPIRIN LOW DOSE 81 MG EC tablet TAKE ONE TABLET BY MOUTH EVERY DAY 30 tablet 2    BD PEN NEEDLE MICRO U/F 32G X 6 MM MISC USE WITH NOVOLOG PEN TWO TIMES A DAY AND AS NEEDED 100 each 5    atorvastatin (LIPITOR) 80 MG tablet TAKE ONE TABLET BY MOUTH EVERY EVENING 30 tablet 2    prasugrel (EFFIENT) 10 MG TABS TAKE ONE TABLET BY

## 2024-01-08 ENCOUNTER — HOSPITAL ENCOUNTER (OUTPATIENT)
Dept: WOUND CARE | Age: 49
Discharge: HOME | End: 2024-01-08

## 2024-01-18 ENCOUNTER — HOSPITAL ENCOUNTER (OUTPATIENT)
Dept: NON INVASIVE DIAGNOSTICS | Age: 49
Discharge: HOME | End: 2024-01-18
Payer: MEDICAID

## 2024-01-18 ENCOUNTER — HOSPITAL ENCOUNTER (OUTPATIENT)
Dept: WOUND CARE | Age: 49
Discharge: HOME | End: 2024-01-18
Payer: MEDICAID

## 2024-01-18 VITALS
DIASTOLIC BLOOD PRESSURE: 84 MMHG | SYSTOLIC BLOOD PRESSURE: 129 MMHG | RESPIRATION RATE: 18 BRPM | TEMPERATURE: 97.3 F | HEART RATE: 77 BPM

## 2024-01-18 DIAGNOSIS — I87.2 VENOUS INSUFFICIENCY: ICD-10-CM

## 2024-01-18 DIAGNOSIS — I73.9 PVD (PERIPHERAL VASCULAR DISEASE) (HCC): ICD-10-CM

## 2024-01-18 DIAGNOSIS — L97.922 SKIN ULCER OF LEFT LOWER LEG WITH FAT LAYER EXPOSED (HCC): Primary | ICD-10-CM

## 2024-01-18 DIAGNOSIS — R60.9 SWELLING: ICD-10-CM

## 2024-01-18 DIAGNOSIS — L97.922 SKIN ULCER OF LEFT LOWER LEG WITH FAT LAYER EXPOSED (HCC): ICD-10-CM

## 2024-01-18 PROCEDURE — 99212 OFFICE O/P EST SF 10 MIN: CPT | Performed by: NURSE PRACTITIONER

## 2024-01-18 PROCEDURE — 93970 EXTREMITY STUDY: CPT

## 2024-01-18 PROCEDURE — 99213 OFFICE O/P EST LOW 20 MIN: CPT

## 2024-01-18 RX ORDER — DOXYCYCLINE 100 MG/1
100 CAPSULE ORAL 2 TIMES DAILY
Qty: 20 CAPSULE | Refills: 0 | Status: SHIPPED | OUTPATIENT
Start: 2024-01-18 | End: 2024-01-28

## 2024-01-18 RX ORDER — LIDOCAINE 50 MG/G
OINTMENT TOPICAL ONCE
OUTPATIENT
Start: 2024-01-18 | End: 2024-01-18

## 2024-01-18 RX ORDER — GINSENG 100 MG
CAPSULE ORAL ONCE
OUTPATIENT
Start: 2024-01-18 | End: 2024-01-18

## 2024-01-18 RX ORDER — LIDOCAINE HYDROCHLORIDE 20 MG/ML
JELLY TOPICAL ONCE
OUTPATIENT
Start: 2024-01-18 | End: 2024-01-18

## 2024-01-18 RX ORDER — SODIUM CHLOR/HYPOCHLOROUS ACID 0.033 %
SOLUTION, IRRIGATION IRRIGATION ONCE
OUTPATIENT
Start: 2024-01-18 | End: 2024-01-18

## 2024-01-18 RX ORDER — BACITRACIN ZINC AND POLYMYXIN B SULFATE 500; 1000 [USP'U]/G; [USP'U]/G
OINTMENT TOPICAL ONCE
OUTPATIENT
Start: 2024-01-18 | End: 2024-01-18

## 2024-01-18 RX ORDER — LIDOCAINE HYDROCHLORIDE 20 MG/ML
JELLY TOPICAL ONCE
Status: COMPLETED | OUTPATIENT
Start: 2024-01-18 | End: 2024-01-18

## 2024-01-18 RX ORDER — CLOBETASOL PROPIONATE 0.5 MG/G
OINTMENT TOPICAL ONCE
OUTPATIENT
Start: 2024-01-18 | End: 2024-01-18

## 2024-01-18 RX ORDER — BETAMETHASONE DIPROPIONATE 0.5 MG/G
CREAM TOPICAL ONCE
OUTPATIENT
Start: 2024-01-18 | End: 2024-01-18

## 2024-01-18 RX ORDER — TRIAMCINOLONE ACETONIDE 1 MG/G
OINTMENT TOPICAL ONCE
OUTPATIENT
Start: 2024-01-18 | End: 2024-01-18

## 2024-01-18 RX ORDER — GENTAMICIN SULFATE 1 MG/G
OINTMENT TOPICAL ONCE
OUTPATIENT
Start: 2024-01-18 | End: 2024-01-18

## 2024-01-18 RX ORDER — IBUPROFEN 200 MG
TABLET ORAL ONCE
OUTPATIENT
Start: 2024-01-18 | End: 2024-01-18

## 2024-01-18 RX ORDER — LIDOCAINE 40 MG/G
CREAM TOPICAL ONCE
OUTPATIENT
Start: 2024-01-18 | End: 2024-01-18

## 2024-01-18 RX ORDER — LIDOCAINE HYDROCHLORIDE 40 MG/ML
SOLUTION TOPICAL ONCE
OUTPATIENT
Start: 2024-01-18 | End: 2024-01-18

## 2024-01-18 RX ADMIN — LIDOCAINE HYDROCHLORIDE: 20 JELLY TOPICAL at 10:37

## 2024-01-18 ASSESSMENT — PAIN DESCRIPTION - LOCATION: LOCATION: LEG

## 2024-01-18 ASSESSMENT — PAIN SCALES - GENERAL: PAINLEVEL_OUTOF10: 7

## 2024-01-18 ASSESSMENT — PAIN DESCRIPTION - DESCRIPTORS: DESCRIPTORS: THROBBING

## 2024-01-18 ASSESSMENT — PAIN DESCRIPTION - ONSET: ONSET: ON-GOING

## 2024-01-18 ASSESSMENT — PAIN DESCRIPTION - PAIN TYPE: TYPE: ACUTE PAIN

## 2024-01-18 ASSESSMENT — PAIN DESCRIPTION - FREQUENCY: FREQUENCY: CONTINUOUS

## 2024-01-18 ASSESSMENT — PAIN DESCRIPTION - ORIENTATION: ORIENTATION: LEFT

## 2024-01-18 ASSESSMENT — PAIN - FUNCTIONAL ASSESSMENT: PAIN_FUNCTIONAL_ASSESSMENT: PREVENTS OR INTERFERES SOME ACTIVE ACTIVITIES AND ADLS

## 2024-01-18 NOTE — PROGRESS NOTES
01/18/24 1029   Dressing/Treatment Hydrofiber Ag;Silicone border 01/04/24 1007   Wound Length (cm) 0.4 cm 01/18/24 1029   Wound Width (cm) 0.4 cm 01/18/24 1029   Wound Depth (cm) 0.2 cm 01/18/24 1029   Wound Surface Area (cm^2) 0.16 cm^2 01/18/24 1029   Change in Wound Size % (l*w) -300 01/18/24 1029   Wound Volume (cm^3) 0.032 cm^3 01/18/24 1029   Wound Healing % -167 01/18/24 1029   Post-Procedure Length (cm) 0.2 cm 01/04/24 0918   Post-Procedure Width (cm) 0.2 cm 01/04/24 0918   Post-Procedure Depth (cm) 0.3 cm 01/04/24 0918   Post-Procedure Surface Area (cm^2) 0.04 cm^2 01/04/24 0918   Post-Procedure Volume (cm^3) 0.012 cm^3 01/04/24 0918   Wound Assessment Slough 01/18/24 1029   Drainage Amount Moderate (25-50%) 01/18/24 1029   Drainage Description Purulent 01/18/24 1029   Odor None 01/18/24 1029   Shelby-wound Assessment Intact;Blanchable erythema 01/18/24 1029   Margins Attached edges 01/18/24 1029   Wound Thickness Description not for Pressure Injury Full thickness 01/18/24 1029   Number of days: 14       Plan:     Problem List Items Addressed This Visit          Circulatory    PVD (peripheral vascular disease) ([facility])    Relevant Orders    Initiate Outpatient Wound Care Protocol    Venous insufficiency    Relevant Orders    Initiate Outpatient Wound Care Protocol       Other    Swelling    Relevant Orders    Initiate Outpatient Wound Care Protocol    * (Principal) Skin ulcer of left lower leg with fat layer exposed ([facility]) - Primary    Relevant Orders    Initiate Outpatient Wound Care Protocol    Culture, Wound     Treatment Note please see attached Discharge Instructions    In my professional opinion this patient would benefit from HBO Therapy: No    Written patient dismissal instructions given to patient and signed by patient or POA.           Mr. Saunders has not had supplies so I do not know what he has been putting on the wound.  He feels it is infection and I agree. Culture the wound today and doxycycline.

## 2024-01-18 NOTE — PATIENT INSTRUCTIONS
[facility] Wound Care and Hyperbaric Oxygen Therapy   Physician Orders and Discharge Instructions  [address] [place] [address]  [address]  [place] [address]  Telephone: [phone]      FAX [phone]    NAME:  Duane Saunders  YOB: 1975  MEDICAL RECORD NUMBER:  [id number]  DATE:  1/18/2024    Discharge condition: Stable    Discharge to: Home    Left via:Private automobile    Accompanied by:  spouse    ECF/HHA: [facility]      Take Doxycycline antibiotic as directed    Dressing Orders:  Left lower leg: Wash with soap and water. Cover with silicone border dressing. Change dressing daily.      Treatment Orders:  Protein rich diet (unless restricted by your physician); Multivitamin daily; Elevate legs above the level of your heart when sitting 3-4 times daily for at least one hour each time, avoid standing for long periods of time.      [facility] follow up visit _______________________________________________________________________________  (Please note your next appointment above and if you are unable to keep, kindly give a 24 hour notice. Thank you.)          If you experience any of the following, please call the Wound Care Center during business hours:    * Increase in Pain  * Temperature over 101  * Increase in drainage from your wound  * Drainage with a foul odor  * Bleeding  * Increase in swelling  * Need for compression bandage changes due to slippage, breakthrough drainage.    If you need medical attention outside of the business hours of the Wound Care Centers please contact your PCP or go to the nearest emergency room.

## 2024-01-18 NOTE — PROGRESS NOTES
[facility] [place] - Infectious Diseases Consult Note    Patient:  James Mar  YOB: 1975  MRN: [id number]   Primary Care Physician: Esequiel Marroquin MD  Referring Physician: Radha Mortensen, *     Chief Complaint:   Chief Complaint   Patient presents with    MRSA left tibia infection     Pain in left tibia     Interval History/HPI: Pleasant 48-year-old man.  He was referred for a chronic intermittent draining wound left lateral leg.  He indicates he has been having problems for at least 10 months.  He was last hospitalized for this in early December a few weeks before having a heart attack.  He indicates he had some redness, swelling and drainage from a wound lateral aspect of the left leg.  He received some IV antibiotic treatment.  He then received some oral antibiotic treatment.  He indicates there was improvement but never complete resolution.  He cannot recall any trauma.  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  He does drive a motorcycle.  He does weed eat and spend a lot of time in the woods, but again he cannot recall any penetrating trauma or episode where he may have had a foreign body introduced into the leg.  He did have a left ankle fracture about 6 years ago.  He indicates it was a closed fracture.  It was treated with casting.  He did not have any wound or problem at that time.  He does indicate he developed some blood clots in the left leg that required anticoagulation with Coumadin for about 6 months.  He has been followed at wound care.  He has had a culture from the open area where there is a little bit of drainage from the left leg.  The culture is grown MRSA.  Susceptibility as outlined below.  He has been on treatment with doxycycline.  When we were first talking at the outset of the appointment, he indicates about 5 years ago he had spent some time turkey hunting.  He indicates his wife found many small ticks on him.  He  "indicates they went to the emergency room for evaluation.  He indicates the doctor the nurse there had for said he was crazy but then as they looked at \"his wife and found a indicated a lot of small ticks had been removed.  He does not definitely correlate his current left leg problem with that tick exposure, but he was wondering whether it could have played a role.  He is not having fever.  He is not having pain or discomfort at this time.  He thinks his last x-ray of the left leg was done in December when he was hospitalized for a heart attack, but in looking the computer we cannot find an x-ray that was done at that time.  He is willing to undergo some additional imaging to make sure there is no foreign body.    Allergies: No Known Allergies  Current Scheduled Medications:   Current Outpatient Medications on File Prior to Visit   Medication Sig    albuterol sulfate  (90 Base) MCG/ACT inhaler Inhale 2 puffs Every 4 (Four) Hours As Needed for Wheezing.    aspirin 81 MG EC tablet Take 1 tablet by mouth Daily.    atorvastatin (LIPITOR) 80 MG tablet Take 1 tablet by mouth Daily.    doxycycline (VIBRAMYICN) 100 MG tablet Take 1 tablet by mouth 2 (Two) Times a Day.    furosemide (LASIX) 20 MG tablet Take 1 tablet by mouth 2 (Two) Times a Day.    glipizide (GLUCOTROL) 10 MG tablet Take 1 tablet by mouth 2 (Two) Times a Day Before Meals.    LACTOBACILLUS BIFIDUS PO Take 250 mg by mouth Daily.    losartan (COZAAR) 50 MG tablet Take 1 tablet by mouth Daily.    metFORMIN (GLUCOPHAGE) 500 MG tablet Take 1 tablet by mouth 2 (Two) Times a Day With Meals.    metoprolol succinate XL (TOPROL-XL) 50 MG 24 hr tablet Take 1 tablet by mouth Daily.    ofloxacin (OCUFLOX) 0.3 % ophthalmic solution Administer 1 drop to both eyes 4 (Four) Times a Day.    prasugrel (EFFIENT) 10 MG tablet Take 1 tablet by mouth Daily.    SEMAGLUTIDE, 1 MG/DOSE, SC Inject  under the skin into the appropriate area as directed.    varenicline (CHANTIX) " "1 MG tablet Take 1 tablet by mouth 2 (Two) Times a Day.     No current facility-administered medications on file prior to visit.     Venous Access Review  Line/IV site: No current IV Access  Antimicrobial Review  Currently on antibiotics/antifungals: YES/NO: NO  Start Date of Therapy: Zyvox 12/3/2023  Clindamycin 12/3/2023  Doxycycline 1/18/2024  If therapy completed, date complete: 1/28/2024    Past Medical History:   Diagnosis Date    Cellulitis and abscess of left leg     Class 1 obesity     Diabetes mellitus     DVT (deep venous thrombosis)     Hyperlipidemia     Hypertension     Tobacco abuse      No past medical history pertinent negatives.  Past Surgical History:   Procedure Laterality Date    APPENDECTOMY      CAROTID STENT      LAPAROSCOPIC CHOLECYSTECTOMY       Family History   Problem Relation Age of Onset    Hypertension Mother     Heart attack Father     Heart attack Maternal Uncle     Heart attack Paternal Uncle      Social History     Socioeconomic History    Marital status:      Spouse name: Mrs. Patrick Mar    Number of children: 3   Tobacco Use    Smoking status: Every Day     Packs/day: 1     Types: Cigarettes    Smokeless tobacco: Never   Vaping Use    Vaping Use: Never used   Substance and Sexual Activity    Alcohol use: Yes     Comment: rare special occasions    Drug use: Yes     Types: Marijuana     Comment: every now and then    Sexual activity: Yes     Partners: Female     Exposure History: No close contacts have been ill    Review of Systems See HPI.  He has not noticed any appetite problems.  He has had no fevers, chills, or sweats.  He has not noticed any groin swelling or adenopathy.  He has not had open wounds elsewhere.    Vital Signs:  /78 (BP Location: Right arm, Patient Position: Sitting, Cuff Size: Adult)   Pulse 83   Temp 97.2 °F (36.2 °C) (Temporal)   Ht 190.5 cm (75\")   Wt 126 kg (278 lb 3.2 oz)   SpO2 96%   BMI 34.77 kg/m²     Physical Exam  Vital signs " - reviewed.  Alert, pleasant, interactive, and in no distress  Lungs are clear to auscultation without crackles  Heart regular rhythm without murmur  Abdomen is soft and nontender  Examination of the left leg shows a small area of opening about 1 cm x 8 mm.  There is a little bit of fibrinous debris in the base.  There is a small amount of serous drainage.  There was some faint erythema particularly proximally to the open wound.  When you palpate the leg you can feel some induration and an area approximating 10 x 5 cm.  There was no fluctuance.  Left lower extremity has slightly more soft tissue swelling in comparison to the right.  Picture of left lateral leg taken today:      Lab/Imaging/Other Information:     Wound culture 1/18/2024      Pictures of left leg taken at wound care.  1/18/2024 left pretibial wound        Cmp 1/2/2024      Cbc 1/2/2024            Impression & Recommendations:   Diagnoses and all orders for this visit:    1. Open leg wound, left, initial encounter (Primary)  -     clindamycin (Cleocin) 300 MG capsule; Take 2 capsules by mouth 3 (Three) Times a Day for 14 days.  Dispense: 84 capsule; Refill: 0  -     XR Tibia Fibula 2 View Left; Future    2. Tobacco use    He has a chronic wound lateral aspect of left lower extremity.  He does a lot of activities where he could potentially have introduced a foreign body into the leg such as a small wood chip, rock, piece of glass etc.  That may be responsible for why that small area continues to open and fails to heal completely.  He could potentially have a different type of skin and soft tissue infection such as atypical Mycobacterium and we are not getting the true causative organism from a surface culture.  He could potentially just have a small wound which is healing slowly because of some underlying risk factors for poor wound healing including tobacco use, mild degree of chronic swelling, and diabetes mellitus.  Would like to give him a course of  oral clindamycin which should have good activity against the organism recovered from surface culture.  Would like to get x-ray to make sure there is no or foreign body.  Will consider CT of the leg as well to look for foreign body.  Would like to see him back in 2 weeks.  If ongoing problems and failure to heal, will consider biopsy for histopathology and culture if no other potential etiology identified from imaging and following clinical course.  I have recommended tobacco cessation-he indicates he is already started Chantix and has plans for tobacco cessation in the near future.  He will call for earlier appointment if any new or worsening problems in the interim.    Follow Up:     There are no Patient Instructions on file for this visit.  Return in about 2 weeks (around 2/5/2024).  Patient was provided After Visit Summary.     Jimmy Laing MD    CC:SHARITA Villar MD   English

## 2024-01-18 NOTE — PLAN OF CARE
Problem: Chronic Conditions and Co-morbidities  Goal: Patient's chronic conditions and co-morbidity symptoms are monitored and maintained or improved  Outcome: Progressing     Problem: Pain  Goal: Verbalizes/displays adequate comfort level or baseline comfort level  Outcome: Progressing     Problem: Wound:  Goal: Will show signs of wound healing; wound closure and no evidence of infection  Description: Will show signs of wound healing; wound closure and no evidence of infection  Outcome: Progressing     Problem: Venous:  Goal: Signs of wound healing will improve  Description: Signs of wound healing will improve  Outcome: Progressing     Problem: Smoking cessation:  Goal: Ability to formulate a plan to maintain a tobacco-free life will be supported  Description: Ability to formulate a plan to maintain a tobacco-free life will be supported  Outcome: Progressing     Problem: Compression therapy:  Goal: Will be free from complications associated with compression therapy  Description: Will be free from complications associated with compression therapy  Outcome: Progressing     Problem: Blood Glucose:  Goal: Ability to maintain appropriate glucose levels will improve  Description: Ability to maintain appropriate glucose levels will improve  Outcome: Progressing

## 2024-01-19 RX ORDER — ALBUTEROL SULFATE 90 UG/1
2 AEROSOL, METERED RESPIRATORY (INHALATION) EVERY 4 HOURS PRN
COMMUNITY

## 2024-01-19 RX ORDER — DOXYCYCLINE HYCLATE 100 MG
100 TABLET ORAL 2 TIMES DAILY
COMMUNITY

## 2024-01-19 RX ORDER — METOPROLOL SUCCINATE 50 MG/1
50 TABLET, EXTENDED RELEASE ORAL DAILY
COMMUNITY

## 2024-01-19 RX ORDER — ATORVASTATIN CALCIUM 80 MG/1
80 TABLET, FILM COATED ORAL DAILY
COMMUNITY

## 2024-01-19 RX ORDER — FUROSEMIDE 20 MG/1
20 TABLET ORAL 2 TIMES DAILY
COMMUNITY

## 2024-01-19 RX ORDER — GLIPIZIDE 10 MG/1
10 TABLET ORAL
COMMUNITY

## 2024-01-19 RX ORDER — PRASUGREL 10 MG/1
10 TABLET, FILM COATED ORAL DAILY
COMMUNITY

## 2024-01-19 RX ORDER — VARENICLINE TARTRATE 1 MG/1
1 TABLET, FILM COATED ORAL 2 TIMES DAILY
COMMUNITY

## 2024-01-19 RX ORDER — ASPIRIN 81 MG/1
81 TABLET ORAL DAILY
COMMUNITY

## 2024-01-19 RX ORDER — LOSARTAN POTASSIUM 50 MG/1
50 TABLET ORAL DAILY
COMMUNITY

## 2024-01-22 ENCOUNTER — OFFICE VISIT (OUTPATIENT)
Age: 49
End: 2024-01-22
Payer: COMMERCIAL

## 2024-01-22 VITALS
TEMPERATURE: 97.2 F | SYSTOLIC BLOOD PRESSURE: 124 MMHG | DIASTOLIC BLOOD PRESSURE: 78 MMHG | BODY MASS INDEX: 34.59 KG/M2 | OXYGEN SATURATION: 96 % | WEIGHT: 278.2 LBS | HEIGHT: 75 IN | HEART RATE: 83 BPM

## 2024-01-22 DIAGNOSIS — S81.802A OPEN LEG WOUND, LEFT, INITIAL ENCOUNTER: Primary | ICD-10-CM

## 2024-01-22 DIAGNOSIS — Z72.0 TOBACCO USE: ICD-10-CM

## 2024-01-22 LAB
BACTERIA SPEC ANAEROBE CULT: ABNORMAL
BACTERIA SPEC ANAEROBE+AEROBE CULT: ABNORMAL
GRAM STN SPEC: ABNORMAL
ORGANISM: ABNORMAL

## 2024-01-22 PROCEDURE — 1159F MED LIST DOCD IN RCRD: CPT | Performed by: INTERNAL MEDICINE

## 2024-01-22 PROCEDURE — 99204 OFFICE O/P NEW MOD 45 MIN: CPT | Performed by: INTERNAL MEDICINE

## 2024-01-22 PROCEDURE — 1160F RVW MEDS BY RX/DR IN RCRD: CPT | Performed by: INTERNAL MEDICINE

## 2024-01-22 RX ORDER — OFLOXACIN 3 MG/ML
1 SOLUTION/ DROPS OPHTHALMIC 4 TIMES DAILY
COMMUNITY
Start: 2023-12-18

## 2024-01-22 RX ORDER — CLINDAMYCIN HYDROCHLORIDE 300 MG/1
600 CAPSULE ORAL 3 TIMES DAILY
Qty: 84 CAPSULE | Refills: 0 | Status: SHIPPED | OUTPATIENT
Start: 2024-01-22 | End: 2024-02-05

## 2024-01-25 ENCOUNTER — HOSPITAL ENCOUNTER (OUTPATIENT)
Dept: WOUND CARE | Age: 49
Discharge: HOME | End: 2024-01-25
Payer: MEDICAID

## 2024-01-25 VITALS
HEART RATE: 84 BPM | HEIGHT: 75 IN | WEIGHT: 271 LBS | RESPIRATION RATE: 18 BRPM | BODY MASS INDEX: 33.69 KG/M2 | SYSTOLIC BLOOD PRESSURE: 128 MMHG | TEMPERATURE: 97 F | DIASTOLIC BLOOD PRESSURE: 70 MMHG

## 2024-01-25 DIAGNOSIS — R60.9 SWELLING: ICD-10-CM

## 2024-01-25 DIAGNOSIS — I87.2 VENOUS INSUFFICIENCY: ICD-10-CM

## 2024-01-25 DIAGNOSIS — I73.9 PVD (PERIPHERAL VASCULAR DISEASE) (HCC): Primary | ICD-10-CM

## 2024-01-25 DIAGNOSIS — L97.922 SKIN ULCER OF LEFT LOWER LEG WITH FAT LAYER EXPOSED (HCC): ICD-10-CM

## 2024-01-25 PROCEDURE — 99212 OFFICE O/P EST SF 10 MIN: CPT | Performed by: NURSE PRACTITIONER

## 2024-01-25 PROCEDURE — 99213 OFFICE O/P EST LOW 20 MIN: CPT

## 2024-01-25 RX ORDER — TRIAMCINOLONE ACETONIDE 1 MG/G
OINTMENT TOPICAL ONCE
OUTPATIENT
Start: 2024-01-25 | End: 2024-01-25

## 2024-01-25 RX ORDER — LIDOCAINE 40 MG/G
CREAM TOPICAL ONCE
OUTPATIENT
Start: 2024-01-25 | End: 2024-01-25

## 2024-01-25 RX ORDER — BACITRACIN ZINC AND POLYMYXIN B SULFATE 500; 1000 [USP'U]/G; [USP'U]/G
OINTMENT TOPICAL ONCE
OUTPATIENT
Start: 2024-01-25 | End: 2024-01-25

## 2024-01-25 RX ORDER — CLOBETASOL PROPIONATE 0.5 MG/G
OINTMENT TOPICAL ONCE
OUTPATIENT
Start: 2024-01-25 | End: 2024-01-25

## 2024-01-25 RX ORDER — LIDOCAINE HYDROCHLORIDE 20 MG/ML
JELLY TOPICAL ONCE
Status: COMPLETED | OUTPATIENT
Start: 2024-01-25 | End: 2024-01-25

## 2024-01-25 RX ORDER — GENTAMICIN SULFATE 1 MG/G
OINTMENT TOPICAL ONCE
OUTPATIENT
Start: 2024-01-25 | End: 2024-01-25

## 2024-01-25 RX ORDER — LIDOCAINE 50 MG/G
OINTMENT TOPICAL ONCE
OUTPATIENT
Start: 2024-01-25 | End: 2024-01-25

## 2024-01-25 RX ORDER — SODIUM HYPOCHLORITE 1.25 MG/ML
SOLUTION TOPICAL
Qty: 1000 ML | Refills: 2 | Status: SHIPPED | OUTPATIENT
Start: 2024-01-25

## 2024-01-25 RX ORDER — CLINDAMYCIN HYDROCHLORIDE 300 MG/1
CAPSULE ORAL
COMMUNITY
Start: 2024-01-22

## 2024-01-25 RX ORDER — SODIUM CHLOR/HYPOCHLOROUS ACID 0.033 %
SOLUTION, IRRIGATION IRRIGATION ONCE
OUTPATIENT
Start: 2024-01-25 | End: 2024-01-25

## 2024-01-25 RX ORDER — LIDOCAINE HYDROCHLORIDE 40 MG/ML
SOLUTION TOPICAL ONCE
OUTPATIENT
Start: 2024-01-25 | End: 2024-01-25

## 2024-01-25 RX ORDER — GINSENG 100 MG
CAPSULE ORAL ONCE
OUTPATIENT
Start: 2024-01-25 | End: 2024-01-25

## 2024-01-25 RX ORDER — LIDOCAINE HYDROCHLORIDE 20 MG/ML
JELLY TOPICAL ONCE
OUTPATIENT
Start: 2024-01-25 | End: 2024-01-25

## 2024-01-25 RX ORDER — IBUPROFEN 200 MG
TABLET ORAL ONCE
OUTPATIENT
Start: 2024-01-25 | End: 2024-01-25

## 2024-01-25 RX ORDER — BETAMETHASONE DIPROPIONATE 0.5 MG/G
CREAM TOPICAL ONCE
OUTPATIENT
Start: 2024-01-25 | End: 2024-01-25

## 2024-01-25 RX ADMIN — LIDOCAINE HYDROCHLORIDE: 20 JELLY TOPICAL at 15:07

## 2024-01-25 ASSESSMENT — PAIN SCALES - GENERAL: PAINLEVEL_OUTOF10: 7

## 2024-01-25 ASSESSMENT — PAIN DESCRIPTION - FREQUENCY: FREQUENCY: CONTINUOUS

## 2024-01-25 ASSESSMENT — PAIN DESCRIPTION - LOCATION: LOCATION: LEG

## 2024-01-25 ASSESSMENT — PAIN DESCRIPTION - DESCRIPTORS: DESCRIPTORS: THROBBING

## 2024-01-25 ASSESSMENT — PAIN DESCRIPTION - ORIENTATION: ORIENTATION: LEFT

## 2024-01-25 ASSESSMENT — PAIN DESCRIPTION - ONSET: ONSET: ON-GOING

## 2024-01-25 ASSESSMENT — PAIN - FUNCTIONAL ASSESSMENT: PAIN_FUNCTIONAL_ASSESSMENT: PREVENTS OR INTERFERES SOME ACTIVE ACTIVITIES AND ADLS

## 2024-01-25 ASSESSMENT — PAIN DESCRIPTION - PAIN TYPE: TYPE: ACUTE PAIN

## 2024-01-25 NOTE — PLAN OF CARE
Problem: Chronic Conditions and Co-morbidities  Goal: Patient's chronic conditions and co-morbidity symptoms are monitored and maintained or improved  Outcome: Progressing     Problem: Pain  Goal: Verbalizes/displays adequate comfort level or baseline comfort level  Outcome: Progressing     Problem: Wound:  Goal: Will show signs of wound healing; wound closure and no evidence of infection  Description: Will show signs of wound healing; wound closure and no evidence of infection  Outcome: Progressing     Problem: Venous:  Goal: Signs of wound healing will improve  Description: Signs of wound healing will improve  Outcome: Progressing     Problem: Smoking cessation:  Goal: Ability to formulate a plan to maintain a tobacco-free life will be supported  Description: Ability to formulate a plan to maintain a tobacco-free life will be supported  Outcome: Progressing     Problem: Compression therapy:  Goal: Will be free from complications associated with compression therapy  Description: Will be free from complications associated with compression therapy  Outcome: Progressing     Problem: Weight control:  Goal: Ability to maintain an optimal weight for height and age will be supported  Description: Ability to maintain an optimal weight for height and age will be supported  Outcome: Progressing     Problem: Falls - Risk of:  Goal: Will remain free from falls  Description: Will remain free from falls  Outcome: Progressing     Problem: Blood Glucose:  Goal: Ability to maintain appropriate glucose levels will improve  Description: Ability to maintain appropriate glucose levels will improve  Outcome: Progressing

## 2024-01-25 NOTE — PROGRESS NOTES
Relevant Orders    Initiate Outpatient Wound Care Protocol       Treatment Note please see attached Discharge Instructions    In my professional opinion this patient would benefit from HBO Therapy: No    Written patient dismissal instructions given to patient and signed by patient or POA.         Mr. Saunders was injured on the job site October 2023.  Hospitalized for cellulitis and has a nonhealing wound to the shin.  He worsened with aqua ange ag and culture positive for MRSA.  Dr. Jeffrey placed him on clindamycin yesterday.  He improved with doxycycline and Dr. Jeffrey is performing further testing.  I will prescribe 1/4 strength dakins and ace wrap, follow up with Dr. Mcgregor in 1 week.    Electronically signed by POP Palacios CNP on 1/25/2024 at 3:26 PM

## 2024-01-25 NOTE — PATIENT INSTRUCTIONS
[facility] Wound Care and Hyperbaric Oxygen Therapy   Physician Orders and Discharge Instructions  [address] [place] [address]  [address]  [place] [address]  Telephone: [phone]      FAX [phone]    NAME:  Duane Saunders  YOB: 1975  MEDICAL RECORD NUMBER:  [id number]  DATE:  1/25/2024    Discharge condition: Stable    Discharge to: Home    Left via:Private automobile    Accompanied by:  spouse    ECF/[facility]: [facility]      Take Cleocin antibiotic as directed    Dressing Orders:  Left lower leg: Wash with soap and water. 1/4 strength Dakins solution moistened gauze to wound bed, cover with dry gauze, roll gauze and 6 inch ace wrap from base of toes to bend of knee. Change dressing twice daily.      Treatment Orders:  Protein rich diet (unless restricted by your physician); Multivitamin daily; Elevate legs above the level of your heart when sitting 3-4 times daily for at least one hour each time, avoid standing for long periods of time.      [facility] follow up visit __________________1 week with Dr. Mcgregor_____________________________________________________________  (Please note your next appointment above and if you are unable to keep, kindly give a 24 hour notice. Thank you.)          If you experience any of the following, please call the Wound Care Center during business hours:    * Increase in Pain  * Temperature over 101  * Increase in drainage from your wound  * Drainage with a foul odor  * Bleeding  * Increase in swelling  * Need for compression bandage changes due to slippage, breakthrough drainage.    If you need medical attention outside of the business hours of the Wound Care Centers please contact your PCP or go to the nearest emergency room.

## 2024-01-26 DIAGNOSIS — S81.802A OPEN LEG WOUND, LEFT, INITIAL ENCOUNTER: ICD-10-CM

## 2024-02-01 NOTE — PROGRESS NOTES
[facility] [place] - Infectious Diseases Consult Note    Patient:  James Mar  YOB: 1975  MRN: [id number]   Primary Care Physician: Radha Mortensen APRN  Referring Physician: Radha Mortensen, *     Chief Complaint:   Chief Complaint   Patient presents with    chronic intermittent draining wound of the left lateral leg     With complaints of drainage and pain     Interval History/HPI: He returns today for follow-up.  He had had a couple of draining areas on the left leg.  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  He took clindamycin.  He tolerated the antibiotic without nausea, diarrhea, or rash.  Overall he feels the left leg is doing better.  He still gets some soreness in the left leg, but this is primarily at the end of the day after he has been working and he has some bilateral lower extremity swelling left slightly greater than right.  He has had an ultrasound within the past 3 to 4 weeks that did not show any evidence of clot per his report.  He did undergo plain films of the left leg which did not identify any bony abnormality or definite foreign body.  Please see report below.  He is continuing to work as a .    Allergies: No Known Allergies    Current Scheduled Medications:   Current Outpatient Medications on File Prior to Visit   Medication Sig    albuterol sulfate  (90 Base) MCG/ACT inhaler Inhale 2 puffs Every 4 (Four) Hours As Needed for Wheezing.    aspirin 81 MG EC tablet Take 1 tablet by mouth Daily.    atorvastatin (LIPITOR) 80 MG tablet Take 1 tablet by mouth Daily.    furosemide (LASIX) 20 MG tablet Take 1 tablet by mouth 2 (Two) Times a Day As Needed.    glipizide (GLUCOTROL) 10 MG tablet Take 1 tablet by mouth 2 (Two) Times a Day Before Meals.    losartan (COZAAR) 50 MG tablet Take 1 tablet by mouth Daily.    metFORMIN (GLUCOPHAGE) 500 MG tablet Take 1 tablet by mouth 2 (Two) Times a Day With  Meals.    metoprolol succinate XL (TOPROL-XL) 50 MG 24 hr tablet Take 1 tablet by mouth Daily.    ofloxacin (OCUFLOX) 0.3 % ophthalmic solution Administer 1 drop to both eyes 4 (Four) Times a Day As Needed.    prasugrel (EFFIENT) 10 MG tablet Take 1 tablet by mouth Daily.    SEMAGLUTIDE, 1 MG/DOSE, SC Inject 0.5 mg under the skin into the appropriate area as directed 1 (One) Time Per Week. Sunday    sodium hypochlorite (DAKIN'S 1/4 STRENGTH) 0.125 % solution topical solution 0.125% Apply 1 mL topically to the appropriate area as directed 2 (Two) Times a Day.    varenicline (CHANTIX) 1 MG tablet Take 1 tablet by mouth 2 (Two) Times a Day.    [DISCONTINUED] clindamycin (Cleocin) 300 MG capsule Take 2 capsules by mouth 3 (Three) Times a Day for 14 days.    [DISCONTINUED] doxycycline (VIBRAMYICN) 100 MG tablet Take 1 tablet by mouth 2 (Two) Times a Day.    [DISCONTINUED] LACTOBACILLUS BIFIDUS PO Take 250 mg by mouth Daily.     No current facility-administered medications on file prior to visit.     Venous Access Review  Line/IV site: No current IV Access    Antimicrobial Review  Currently on antibiotics/antifungals: YES/NO: NO  Start Date of Therapy: Zyvox 12/3/2023  Clindamycin 12/3/2023  Doxycycline 1/18/2024  If therapy completed, date complete: 01- - 02-: clindamycin 600 MG PO TID    Past Medical History:   Diagnosis Date    Cellulitis and abscess of left leg     Class 1 obesity     Diabetes mellitus     DVT (deep venous thrombosis)     Hyperlipidemia     Hypertension     Tobacco abuse      No past medical history pertinent negatives.  Past Surgical History:   Procedure Laterality Date    APPENDECTOMY      CAROTID STENT      LAPAROSCOPIC CHOLECYSTECTOMY       Family History   Problem Relation Age of Onset    Hypertension Mother     Heart attack Father     Heart attack Maternal Uncle     Heart attack Paternal Uncle      Social History     Socioeconomic History    Marital status:      Spouse name:  "Mrs. Patrick Mar    Number of children: 3   Tobacco Use    Smoking status: Every Day     Packs/day: 1     Types: Cigarettes     Passive exposure: Past    Smokeless tobacco: Never   Vaping Use    Vaping Use: Never used   Substance and Sexual Activity    Alcohol use: Yes     Comment: rare special occasions    Drug use: Yes     Types: Marijuana     Comment: every now and then    Sexual activity: Defer     Partners: Female     Exposure History: No close contacts have been ill    Review of Systems See HPI.  He has not noticed any appetite problems.  He has had no fevers, chills, or sweats.  He has not noticed any groin swelling or adenopathy.  He has not had open wounds elsewhere.    Vital Signs:  /68 Comment: manual left arm  Pulse 76   Temp 97.9 °F (36.6 °C) (Temporal)   Ht 190.5 cm (75\")   Wt 123 kg (272 lb)   SpO2 96%   BMI 34.00 kg/m²     Physical Exam  Vital signs - reviewed.  Left lower extremity has 2 areas that have eschar.  Overall there seems to be improvement.  The lateral proximal left lower extremity area that was present previously has improved.  There is less drainage.  There is actually no drainage today and with palpation there is no fluctuance and I am not able to express any drainage.  He has a little bit of induration in the skin that is slightly discolored involving the left lower extremity, but he has that present on the right lower extremity as well.  This seems like some chronic venous type stasis changes.  There was no evidence of cellulitis at present.  Photos of the left lower extremity and also photos of both lower extremities were taken today and placed in epic.    Lab/Imaging/Other Information: Culture results from previous were again reviewed.  I had ordered a x-ray of the lower extremity that was done following his last appointment.  There is not appear to be any bony abnormality or foreign body on plain film imaging.    Wound culture 1/18/2024      XRAY LEFT TIBIA/FIB " 1/23/2024          Impression & Recommendations:   Diagnoses and all orders for this visit:    1. Open leg wound, left, initial encounter (Primary)  -     clindamycin (Cleocin) 300 MG capsule; Take 2 capsules by mouth 3 (Three) Times a Day for 7 days.  Dispense: 42 capsule; Refill: 0    Feel he may have had a boil/folliculitis or small infected foreign body.  He has improved with his course of clindamycin.  He is tolerating without difficulty.  Since he is showing improvement I am going to go ahead and continue 1 additional week.  He will then continue off antibiotic therapy.  If he develops increasing pain, swelling, warmth, or drainage she will call for earlier appointment.  He otherwise will follow-up in 1 month.  He was comfortable with the plan we discussed.    Follow Up:     There are no Patient Instructions on file for this visit.  Return in about 4 weeks (around 3/4/2024).  Patient was provided After Visit Summary.     Jimmy Liang MD    CC:SHARITA Villar MD

## 2024-02-05 ENCOUNTER — HOSPITAL ENCOUNTER (OUTPATIENT)
Dept: WOUND CARE | Age: 49
Discharge: HOME | End: 2024-02-05
Payer: MEDICAID

## 2024-02-05 ENCOUNTER — OFFICE VISIT (OUTPATIENT)
Age: 49
End: 2024-02-05
Payer: COMMERCIAL

## 2024-02-05 VITALS
OXYGEN SATURATION: 96 % | HEART RATE: 76 BPM | HEIGHT: 75 IN | WEIGHT: 272 LBS | BODY MASS INDEX: 33.82 KG/M2 | DIASTOLIC BLOOD PRESSURE: 68 MMHG | TEMPERATURE: 97.9 F | SYSTOLIC BLOOD PRESSURE: 110 MMHG

## 2024-02-05 VITALS
HEIGHT: 75 IN | RESPIRATION RATE: 18 BRPM | SYSTOLIC BLOOD PRESSURE: 132 MMHG | TEMPERATURE: 97.6 F | WEIGHT: 271 LBS | HEART RATE: 86 BPM | BODY MASS INDEX: 33.69 KG/M2 | DIASTOLIC BLOOD PRESSURE: 58 MMHG

## 2024-02-05 DIAGNOSIS — R60.9 SWELLING: ICD-10-CM

## 2024-02-05 DIAGNOSIS — I73.9 PVD (PERIPHERAL VASCULAR DISEASE) (HCC): Primary | ICD-10-CM

## 2024-02-05 DIAGNOSIS — S81.802A OPEN LEG WOUND, LEFT, INITIAL ENCOUNTER: Primary | ICD-10-CM

## 2024-02-05 DIAGNOSIS — L97.922 SKIN ULCER OF LEFT LOWER LEG WITH FAT LAYER EXPOSED (HCC): ICD-10-CM

## 2024-02-05 DIAGNOSIS — I87.2 VENOUS INSUFFICIENCY: ICD-10-CM

## 2024-02-05 PROCEDURE — 99214 OFFICE O/P EST MOD 30 MIN: CPT | Performed by: INTERNAL MEDICINE

## 2024-02-05 PROCEDURE — 1159F MED LIST DOCD IN RCRD: CPT | Performed by: INTERNAL MEDICINE

## 2024-02-05 PROCEDURE — 97597 DBRDMT OPN WND 1ST 20 CM/<: CPT | Performed by: SURGERY

## 2024-02-05 PROCEDURE — 97597 DBRDMT OPN WND 1ST 20 CM/<: CPT

## 2024-02-05 PROCEDURE — 1160F RVW MEDS BY RX/DR IN RCRD: CPT | Performed by: INTERNAL MEDICINE

## 2024-02-05 RX ORDER — LIDOCAINE 40 MG/G
CREAM TOPICAL ONCE
OUTPATIENT
Start: 2024-02-05 | End: 2024-02-05

## 2024-02-05 RX ORDER — SODIUM CHLOR/HYPOCHLOROUS ACID 0.033 %
SOLUTION, IRRIGATION IRRIGATION ONCE
OUTPATIENT
Start: 2024-02-05 | End: 2024-02-05

## 2024-02-05 RX ORDER — GENTAMICIN SULFATE 1 MG/G
OINTMENT TOPICAL ONCE
OUTPATIENT
Start: 2024-02-05 | End: 2024-02-05

## 2024-02-05 RX ORDER — GINSENG 100 MG
CAPSULE ORAL ONCE
OUTPATIENT
Start: 2024-02-05 | End: 2024-02-05

## 2024-02-05 RX ORDER — LIDOCAINE HYDROCHLORIDE 40 MG/ML
SOLUTION TOPICAL ONCE
OUTPATIENT
Start: 2024-02-05 | End: 2024-02-05

## 2024-02-05 RX ORDER — TRIAMCINOLONE ACETONIDE 1 MG/G
OINTMENT TOPICAL ONCE
OUTPATIENT
Start: 2024-02-05 | End: 2024-02-05

## 2024-02-05 RX ORDER — CLOBETASOL PROPIONATE 0.5 MG/G
OINTMENT TOPICAL ONCE
OUTPATIENT
Start: 2024-02-05 | End: 2024-02-05

## 2024-02-05 RX ORDER — BETAMETHASONE DIPROPIONATE 0.5 MG/G
CREAM TOPICAL ONCE
OUTPATIENT
Start: 2024-02-05 | End: 2024-02-05

## 2024-02-05 RX ORDER — CLINDAMYCIN HYDROCHLORIDE 300 MG/1
600 CAPSULE ORAL 3 TIMES DAILY
Qty: 42 CAPSULE | Refills: 0 | Status: SHIPPED | OUTPATIENT
Start: 2024-02-05 | End: 2024-02-12

## 2024-02-05 RX ORDER — IBUPROFEN 200 MG
TABLET ORAL ONCE
OUTPATIENT
Start: 2024-02-05 | End: 2024-02-05

## 2024-02-05 RX ORDER — LIDOCAINE HYDROCHLORIDE 20 MG/ML
JELLY TOPICAL ONCE
OUTPATIENT
Start: 2024-02-05 | End: 2024-02-05

## 2024-02-05 RX ORDER — SODIUM HYPOCHLORITE 1.25 MG/ML
1 SOLUTION TOPICAL 2 TIMES DAILY
COMMUNITY
Start: 2024-01-25

## 2024-02-05 RX ORDER — LIDOCAINE HYDROCHLORIDE 20 MG/ML
JELLY TOPICAL ONCE
Status: COMPLETED | OUTPATIENT
Start: 2024-02-05 | End: 2024-02-05

## 2024-02-05 RX ORDER — BACITRACIN ZINC AND POLYMYXIN B SULFATE 500; 1000 [USP'U]/G; [USP'U]/G
OINTMENT TOPICAL ONCE
OUTPATIENT
Start: 2024-02-05 | End: 2024-02-05

## 2024-02-05 RX ORDER — LIDOCAINE 50 MG/G
OINTMENT TOPICAL ONCE
OUTPATIENT
Start: 2024-02-05 | End: 2024-02-05

## 2024-02-05 RX ADMIN — LIDOCAINE HYDROCHLORIDE: 20 JELLY TOPICAL at 16:06

## 2024-02-05 ASSESSMENT — PAIN DESCRIPTION - FREQUENCY: FREQUENCY: CONTINUOUS

## 2024-02-05 ASSESSMENT — PAIN - FUNCTIONAL ASSESSMENT: PAIN_FUNCTIONAL_ASSESSMENT: PREVENTS OR INTERFERES SOME ACTIVE ACTIVITIES AND ADLS

## 2024-02-05 ASSESSMENT — PAIN DESCRIPTION - DESCRIPTORS: DESCRIPTORS: DISCOMFORT;THROBBING;SORE

## 2024-02-05 ASSESSMENT — PAIN DESCRIPTION - PAIN TYPE: TYPE: ACUTE PAIN

## 2024-02-05 ASSESSMENT — PAIN DESCRIPTION - ORIENTATION: ORIENTATION: LEFT

## 2024-02-05 ASSESSMENT — PAIN SCALES - GENERAL: PAINLEVEL_OUTOF10: 6

## 2024-02-05 ASSESSMENT — PAIN DESCRIPTION - ONSET: ONSET: ON-GOING

## 2024-02-05 ASSESSMENT — PAIN DESCRIPTION - LOCATION: LOCATION: LEG

## 2024-02-05 NOTE — PROGRESS NOTES
Problem List Items Addressed This Visit       * (Principal) Skin ulcer of left lower leg with fat layer exposed (HCC) (Chronic)    Relevant Orders    Initiate Outpatient Wound Care Protocol    PVD (peripheral vascular disease) (HCC) - Primary    Relevant Orders    Initiate Outpatient Wound Care Protocol    Swelling    Relevant Orders    Initiate Outpatient Wound Care Protocol    Venous insufficiency    Relevant Orders    Initiate Outpatient Wound Care Protocol        Procedure Note  Indications:  Based on my examination of this patient's wound(s)/ulcer(s) today, debridement is required to promote healing and evaluate the wound base.    Performed by: Favian Mcgregor MD    Consent obtained:  Yes    Time out taken:  Yes    Pain Control: Anesthetic  Anesthetic: 2% Lidocaine Gel Topical       Debridement:Non-excisional Debridement    Using curette the wound(s)/ulcer(s) was/were sharply debrided down through and including the removal of epidermis and dermis.        Devitalized Tissue Debrided:  fibrin, biofilm, slough, necrotic/eschar, and exudate      Pre Debridement Measurements:  Are located in the Wound/Ulcer Documentation Flow Sheet    Wound/Ulcer #: 1    Percent of Wound(s)/Ulcer(s) Debrided: 100%    Total Surface Area Debrided:  0.16 sq cm       Diabetic/Pressure/Non Pressure Ulcers only:  Ulcer: Non-Pressure ulcer, fat layer exposed             Post Debridement Measurements:    Wound/Ulcer Descriptions are Pre Debridement --EXCEPT MEASUREMENTS    Wound 01/04/24 Pretibial Left Wound #1 Left Pretibial lower leg (Active)   Wound Image   02/05/24 1607   Wound Etiology Venous 02/05/24 1607   Dressing Status Old drainage noted 02/05/24 1607   Wound Cleansed Soap and water 02/05/24 1607   Dressing/Treatment Gauze dressing/dressing sponge;Moist to dry 01/25/24 1537   Wound Length (cm) 0.4 cm 02/05/24 1607   Wound Width (cm) 0.4 cm 02/05/24 1607   Wound Depth (cm) 0.1 cm 02/05/24 1607   Wound Surface Area (cm^2) 0.16

## 2024-02-05 NOTE — PATIENT INSTRUCTIONS
[facility]ound Care and Hyperbaric Oxygen Therapy   Physician Orders and Discharge Instructions  [address] [place] [address]  [address]  [place] [address]  Telephone: [phone]      FAX [phone]    NAME:  Duane Saunders  YOB: 1975  MEDICAL RECORD NUMBER:  [id number]  DATE:  2/5/2024    Discharge condition: Stable    Discharge to: Home    Left via:Private automobile    Accompanied by:  spouse    ECF/HHA: [facility]      Take Cleocin antibiotic as directed    Dressing Orders:  Left lower leg: Wash with soap and water. 1/4 strength Dakins solution moistened gauze to wound bed, cover with dry gauze, roll gauze and 6 inch ace wrap from base of toes to bend of knee. Change dressing twice daily.    Left thumb: Wash with soap and water. Keep clean and dry. Dry bandage change daily as needed.      Treatment Orders:  Protein rich diet (unless restricted by your physician); Multivitamin daily; Elevate legs above the level of your heart when sitting 3-4 times daily for at least one hour each time, avoid standing for long periods of time.      [facility] follow up visit __________________2 weeks with Dr. Mcgregor_____________________________________________________________  (Please note your next appointment above and if you are unable to keep, kindly give a 24 hour notice. Thank you.)          If you experience any of the following, please call the Wound Care Center during business hours:    * Increase in Pain  * Temperature over 101  * Increase in drainage from your wound  * Drainage with a foul odor  * Bleeding  * Increase in swelling  * Need for compression bandage changes due to slippage, breakthrough drainage.    If you need medical attention outside of the business hours of the Wound Care Centers please contact your PCP or go to the nearest emergency room.

## 2024-02-06 NOTE — PLAN OF CARE
Problem: Chronic Conditions and Co-morbidities  Goal: Patient's chronic conditions and co-morbidity symptoms are monitored and maintained or improved  Outcome: Progressing     Problem: Pain  Goal: Verbalizes/displays adequate comfort level or baseline comfort level  Outcome: Progressing     Problem: Wound:  Goal: Will show signs of wound healing; wound closure and no evidence of infection  Description: Will show signs of wound healing; wound closure and no evidence of infection  Outcome: Progressing     Problem: Compression therapy:  Goal: Will be free from complications associated with compression therapy  Description: Will be free from complications associated with compression therapy  Outcome: Progressing     Problem: Weight control:  Goal: Ability to maintain an optimal weight for height and age will be supported  Description: Ability to maintain an optimal weight for height and age will be supported  Outcome: Progressing     Problem: Falls - Risk of:  Goal: Will remain free from falls  Description: Will remain free from falls  Outcome: Progressing     Problem: Blood Glucose:  Goal: Ability to maintain appropriate glucose levels will improve  Description: Ability to maintain appropriate glucose levels will improve  Outcome: Progressing

## 2024-02-22 DIAGNOSIS — E11.9 TYPE 2 DIABETES MELLITUS WITHOUT COMPLICATION, UNSPECIFIED WHETHER LONG TERM INSULIN USE (HCC): ICD-10-CM

## 2024-02-22 DIAGNOSIS — E78.5 HYPERLIPIDEMIA, UNSPECIFIED HYPERLIPIDEMIA TYPE: ICD-10-CM

## 2024-02-22 LAB
ALBUMIN SERPL-MCNC: 4.3 G/DL (ref 3.5–5.2)
ALP SERPL-CCNC: 73 U/L (ref 40–130)
ALT SERPL-CCNC: 26 U/L (ref 5–41)
ANION GAP SERPL CALCULATED.3IONS-SCNC: 12 MMOL/L (ref 7–19)
AST SERPL-CCNC: 23 U/L (ref 5–40)
BASOPHILS # BLD: 0.1 K/UL (ref 0–0.2)
BASOPHILS NFR BLD: 1.4 % (ref 0–1)
BILIRUB SERPL-MCNC: 0.3 MG/DL (ref 0.2–1.2)
BUN SERPL-MCNC: 12 MG/DL (ref 6–20)
CALCIUM SERPL-MCNC: 8.7 MG/DL (ref 8.6–10)
CHLORIDE SERPL-SCNC: 102 MMOL/L (ref 98–111)
CHOLEST SERPL-MCNC: 137 MG/DL (ref 160–199)
CO2 SERPL-SCNC: 27 MMOL/L (ref 22–29)
CREAT SERPL-MCNC: 0.8 MG/DL (ref 0.5–1.2)
EOSINOPHIL # BLD: 0.3 K/UL (ref 0–0.6)
EOSINOPHIL NFR BLD: 4.2 % (ref 0–5)
ERYTHROCYTE [DISTWIDTH] IN BLOOD BY AUTOMATED COUNT: 13.7 % (ref 11.5–14.5)
GLUCOSE SERPL-MCNC: 237 MG/DL (ref 74–109)
HBA1C MFR BLD: 6.8 % (ref 4–6)
HCT VFR BLD AUTO: 43.5 % (ref 42–52)
HDLC SERPL-MCNC: 51 MG/DL (ref 55–121)
HGB BLD-MCNC: 14.4 G/DL (ref 14–18)
IMM GRANULOCYTES # BLD: 0 K/UL
LDLC SERPL CALC-MCNC: 56 MG/DL
LYMPHOCYTES # BLD: 2 K/UL (ref 1.1–4.5)
LYMPHOCYTES NFR BLD: 28.3 % (ref 20–40)
MCH RBC QN AUTO: 30.3 PG (ref 27–31)
MCHC RBC AUTO-ENTMCNC: 33.1 G/DL (ref 33–37)
MCV RBC AUTO: 91.6 FL (ref 80–94)
MONOCYTES # BLD: 0.5 K/UL (ref 0–0.9)
MONOCYTES NFR BLD: 7.8 % (ref 0–10)
NEUTROPHILS # BLD: 4 K/UL (ref 1.5–7.5)
NEUTS SEG NFR BLD: 58 % (ref 50–65)
PLATELET # BLD AUTO: 264 K/UL (ref 130–400)
PMV BLD AUTO: 11.2 FL (ref 9.4–12.4)
POTASSIUM SERPL-SCNC: 3.7 MMOL/L (ref 3.5–5)
PROT SERPL-MCNC: 6.8 G/DL (ref 6.6–8.7)
RBC # BLD AUTO: 4.75 M/UL (ref 4.7–6.1)
SODIUM SERPL-SCNC: 141 MMOL/L (ref 136–145)
TRIGL SERPL-MCNC: 148 MG/DL (ref 0–149)
WBC # BLD AUTO: 6.9 K/UL (ref 4.8–10.8)

## 2024-02-23 ENCOUNTER — OFFICE VISIT (OUTPATIENT)
Dept: INTERNAL MEDICINE | Age: 49
End: 2024-02-23
Payer: MEDICAID

## 2024-02-23 VITALS
BODY MASS INDEX: 34.37 KG/M2 | HEART RATE: 77 BPM | WEIGHT: 275 LBS | SYSTOLIC BLOOD PRESSURE: 122 MMHG | OXYGEN SATURATION: 98 % | DIASTOLIC BLOOD PRESSURE: 78 MMHG

## 2024-02-23 DIAGNOSIS — L97.922 SKIN ULCER OF LEFT LOWER LEG WITH FAT LAYER EXPOSED (HCC): Primary | Chronic | ICD-10-CM

## 2024-02-23 DIAGNOSIS — E78.5 HYPERLIPIDEMIA, UNSPECIFIED HYPERLIPIDEMIA TYPE: ICD-10-CM

## 2024-02-23 DIAGNOSIS — E11.9 TYPE 2 DIABETES MELLITUS WITHOUT COMPLICATION, UNSPECIFIED WHETHER LONG TERM INSULIN USE (HCC): ICD-10-CM

## 2024-02-23 PROCEDURE — 4004F PT TOBACCO SCREEN RCVD TLK: CPT | Performed by: NURSE PRACTITIONER

## 2024-02-23 PROCEDURE — 3044F HG A1C LEVEL LT 7.0%: CPT | Performed by: NURSE PRACTITIONER

## 2024-02-23 PROCEDURE — G8484 FLU IMMUNIZE NO ADMIN: HCPCS | Performed by: NURSE PRACTITIONER

## 2024-02-23 PROCEDURE — G8427 DOCREV CUR MEDS BY ELIG CLIN: HCPCS | Performed by: NURSE PRACTITIONER

## 2024-02-23 PROCEDURE — 2022F DILAT RTA XM EVC RTNOPTHY: CPT | Performed by: NURSE PRACTITIONER

## 2024-02-23 PROCEDURE — 99214 OFFICE O/P EST MOD 30 MIN: CPT | Performed by: NURSE PRACTITIONER

## 2024-02-23 PROCEDURE — G8417 CALC BMI ABV UP PARAM F/U: HCPCS | Performed by: NURSE PRACTITIONER

## 2024-02-23 SDOH — ECONOMIC STABILITY: FOOD INSECURITY: WITHIN THE PAST 12 MONTHS, THE FOOD YOU BOUGHT JUST DIDN'T LAST AND YOU DIDN'T HAVE MONEY TO GET MORE.: NEVER TRUE

## 2024-02-23 SDOH — ECONOMIC STABILITY: HOUSING INSECURITY
IN THE LAST 12 MONTHS, WAS THERE A TIME WHEN YOU DID NOT HAVE A STEADY PLACE TO SLEEP OR SLEPT IN A SHELTER (INCLUDING NOW)?: NO

## 2024-02-23 SDOH — ECONOMIC STABILITY: FOOD INSECURITY: WITHIN THE PAST 12 MONTHS, YOU WORRIED THAT YOUR FOOD WOULD RUN OUT BEFORE YOU GOT MONEY TO BUY MORE.: NEVER TRUE

## 2024-02-23 SDOH — ECONOMIC STABILITY: INCOME INSECURITY: HOW HARD IS IT FOR YOU TO PAY FOR THE VERY BASICS LIKE FOOD, HOUSING, MEDICAL CARE, AND HEATING?: NOT HARD AT ALL

## 2024-02-23 ASSESSMENT — PATIENT HEALTH QUESTIONNAIRE - PHQ9
SUM OF ALL RESPONSES TO PHQ9 QUESTIONS 1 & 2: 0
2. FEELING DOWN, DEPRESSED OR HOPELESS: 0
SUM OF ALL RESPONSES TO PHQ QUESTIONS 1-9: 0
SUM OF ALL RESPONSES TO PHQ QUESTIONS 1-9: 0
1. LITTLE INTEREST OR PLEASURE IN DOING THINGS: 0
SUM OF ALL RESPONSES TO PHQ QUESTIONS 1-9: 0
SUM OF ALL RESPONSES TO PHQ QUESTIONS 1-9: 0

## 2024-02-23 ASSESSMENT — ENCOUNTER SYMPTOMS
SHORTNESS OF BREATH: 0
CHEST TIGHTNESS: 0

## 2024-02-23 NOTE — PROGRESS NOTES
Cardiovascular:      Rate and Rhythm: Normal rate and regular rhythm.      Heart sounds: Normal heart sounds. No murmur heard.  Pulmonary:      Effort: Pulmonary effort is normal. No respiratory distress.      Breath sounds: Normal breath sounds. No wheezing.   Abdominal:      General: Bowel sounds are normal.      Palpations: Abdomen is soft.      Tenderness: There is no abdominal tenderness.   Skin:     General: Skin is warm and dry.      Findings: No rash.   Neurological:      Mental Status: He is alert and oriented to person, place, and time.   Psychiatric:         Mood and Affect: Mood normal.         Behavior: Behavior normal.       /78 (Site: Left Upper Arm)   Pulse 77   Wt 124.7 kg (275 lb)   SpO2 98%   BMI 34.37 kg/m²     :Assessment       Diagnosis Orders   1. Skin ulcer of left lower leg with fat layer exposed (HCC)  External Referral To Wound Clinic      2. Type 2 diabetes mellitus without complication, unspecified whether long term insulin use (HCC)  Semaglutide,0.25 or 0.5MG/DOS, 2 MG/3ML SOPN      3. Hyperlipidemia, unspecified hyperlipidemia type            :Plan    Reviewed labs with patient.     Diabetes is well controlled for now. Continue ozempic at the 0.5mg weekly dose. If it gets worse we can always increase to the 1mg dosage. Continue metformin and glipizide.     Cholesterol improved. Continue atorvastatin,    Wound care referral to [facility].     Return in 3 months    Go ahead and start additional antibiotics for leg.  Orders Placed This Encounter   Procedures    External Referral To Wound Clinic     Referral Priority:   Routine     Referral Type:   Eval and Treat     Referral Reason:   Specialty Services Required     Requested Specialty:   Wound Care     Number of Visits Requested:   1   No results found for this visit on 02/23/24.      No follow-ups on file.    Orders Placed This Encounter   Medications    Semaglutide,0.25 or 0.5MG/DOS, 2 MG/3ML SOPN     Sig: Inject 0.5 mg into

## 2024-02-29 ENCOUNTER — OFFICE VISIT (OUTPATIENT)
Dept: WOUND CARE | Facility: HOSPITAL | Age: 49
End: 2024-02-29
Payer: COMMERCIAL

## 2024-02-29 PROCEDURE — G0463 HOSPITAL OUTPT CLINIC VISIT: HCPCS

## 2024-03-04 ENCOUNTER — TELEPHONE (OUTPATIENT)
Dept: HEMATOLOGY | Age: 49
End: 2024-03-04

## 2024-03-06 ENCOUNTER — TELEPHONE (OUTPATIENT)
Dept: VASCULAR SURGERY | Facility: CLINIC | Age: 49
End: 2024-03-06
Payer: COMMERCIAL

## 2024-03-07 ENCOUNTER — OFFICE VISIT (OUTPATIENT)
Dept: VASCULAR SURGERY | Facility: CLINIC | Age: 49
End: 2024-03-07
Payer: COMMERCIAL

## 2024-03-07 VITALS
HEART RATE: 72 BPM | SYSTOLIC BLOOD PRESSURE: 134 MMHG | DIASTOLIC BLOOD PRESSURE: 78 MMHG | HEIGHT: 75 IN | BODY MASS INDEX: 32.58 KG/M2 | OXYGEN SATURATION: 98 % | WEIGHT: 262 LBS

## 2024-03-07 DIAGNOSIS — I87.333 CHRONIC VENOUS HYPERTENSION WITH ULCER AND INFLAMMATION INVOLVING BOTH SIDES: Primary | ICD-10-CM

## 2024-03-07 DIAGNOSIS — E11.9 TYPE 2 DIABETES MELLITUS WITHOUT COMPLICATION, WITHOUT LONG-TERM CURRENT USE OF INSULIN: ICD-10-CM

## 2024-03-07 DIAGNOSIS — E78.49 OTHER HYPERLIPIDEMIA: ICD-10-CM

## 2024-03-07 DIAGNOSIS — I10 ESSENTIAL HYPERTENSION: ICD-10-CM

## 2024-03-07 NOTE — PROGRESS NOTES
3/7/2024        Radha Mortensen, SHARITA  [address] [place] [address] Drive [address]  [facility] [address]      James Mar  1975    Chief Complaint   Patient presents with    NEW PATIENT     Referral from wound care for pain and discoloration in legs. Has been for past few years. Does wear compressions but does not seem to help. Patient is a current smoker of 35 years.        Dear SHARITA Douglass:      HPI  I had the pleasure of seeing your patient James Mar in the office today.  Thank you kindly for this consultation.  As you recall, James Mar is a 48 y.o.  male who you are currently following for superficial wound to the left lateral lower extremity.  We are seeing him for evaluation of venous insufficiency.  He had previous VVI performed at [facility], unfortunately the results do not provide sufficient information in regard to the greater saphenous vein on his left lower extremity.  He states he has pain in his bilateral lower extremities at rest, and swelling.  He also complains of pain in his feet when walking.  He denies any symptoms of claudication or ischemia.  He did have recent ABIs performed at [facility] on 9/2023.  He denies strokelike symptoms.  He does have a history of DVT to that left lower extremity.  He does wear compression stockings daily, and elevates his legs.  He is maintained on aspirin, and Lipitor.    Past Medical History:   Diagnosis Date    Cellulitis and abscess of left leg     Class 1 obesity     Diabetes mellitus     DVT (deep venous thrombosis)     Hyperlipidemia     Hypertension     Tobacco abuse        Past Surgical History:   Procedure Laterality Date    APPENDECTOMY      CORONARY STENT PLACEMENT  2023    [facility]    LAPAROSCOPIC CHOLECYSTECTOMY         Family History   Problem Relation Age of Onset    Hypertension Mother     Heart attack Father     Heart attack Maternal Uncle     Heart attack Paternal Uncle        Social History     Socioeconomic History     Marital status:      Spouse name: Mrs. Patrick Mar    Number of children: 3   Tobacco Use    Smoking status: Every Day     Current packs/day: 1.00     Types: Cigarettes     Passive exposure: Past    Smokeless tobacco: Never   Vaping Use    Vaping status: Never Used   Substance and Sexual Activity    Alcohol use: Yes     Comment: rare special occasions    Drug use: Yes     Types: Marijuana     Comment: every now and then    Sexual activity: Defer     Partners: Female       No Known Allergies    Prior to Admission medications    Medication Sig Start Date End Date Taking? Authorizing Provider   albuterol sulfate  (90 Base) MCG/ACT inhaler Inhale 2 puffs Every 4 (Four) Hours As Needed for Wheezing.    Triston Christian MD   aspirin 81 MG EC tablet Take 1 tablet by mouth Daily.    Triston Christian MD   atorvastatin (LIPITOR) 80 MG tablet Take 1 tablet by mouth Daily.    Triston Christian MD   furosemide (LASIX) 20 MG tablet Take 1 tablet by mouth 2 (Two) Times a Day As Needed.    Triston Christian MD   glipizide (GLUCOTROL) 10 MG tablet Take 1 tablet by mouth 2 (Two) Times a Day Before Meals.    Triston Christian MD   losartan (COZAAR) 50 MG tablet Take 1 tablet by mouth Daily.    Triston Christian MD   metFORMIN (GLUCOPHAGE) 500 MG tablet Take 1 tablet by mouth 2 (Two) Times a Day With Meals.    Triston Christian MD   metoprolol succinate XL (TOPROL-XL) 50 MG 24 hr tablet Take 1 tablet by mouth Daily.    Triston Christian MD   ofloxacin (OCUFLOX) 0.3 % ophthalmic solution Administer 1 drop to both eyes 4 (Four) Times a Day As Needed. 12/18/23   Triston Christian MD   prasugrel (EFFIENT) 10 MG tablet Take 1 tablet by mouth Daily.    Triston Christian MD   SEMAGLUTIDE, 1 MG/DOSE, SC Inject 0.5 mg under the skin into the appropriate area as directed 1 (One) Time Per Week. Sunday    Triston Christian MD   sodium hypochlorite (DAKIN'S 1/4 STRENGTH) 0.125 %  "solution topical solution 0.125% Apply 1 mL topically to the appropriate area as directed 2 (Two) Times a Day. 1/25/24   Provider, MD Triston   varenicline (CHANTIX) 1 MG tablet Take 1 tablet by mouth 2 (Two) Times a Day.    Provider, MD Triston       Review of Systems   Constitutional: Negative.  Negative for diaphoresis and fever.   HENT: Negative.     Eyes: Negative.    Respiratory: Negative.  Negative for shortness of breath and wheezing.    Cardiovascular: Negative.  Positive for leg swelling. Negative for chest pain.   Gastrointestinal: Negative.  Negative for abdominal pain.   Endocrine: Negative.    Genitourinary: Negative.    Musculoskeletal: Negative.    Skin: Negative.  Positive for color change and wound.   Allergic/Immunologic: Negative.    Neurological: Negative.  Negative for dizziness and weakness.   Hematological: Negative.    Psychiatric/Behavioral: Negative.         /78   Pulse 72   Ht 190.5 cm (75\")   Wt 119 kg (262 lb)   SpO2 98%   BMI 32.75 kg/m²       Physical Exam  Vitals and nursing note reviewed.   Constitutional:       General: He is not in acute distress.     Appearance: Normal appearance. He is not diaphoretic.   HENT:      Head: Normocephalic. No right periorbital erythema or left periorbital erythema.      Nose: Nose normal.   Eyes:      General: No scleral icterus.     Pupils: Pupils are equal.   Cardiovascular:      Rate and Rhythm: Normal rate and regular rhythm.      Pulses:           Femoral pulses are 2+ on the right side and 2+ on the left side.       Popliteal pulses are 2+ on the right side and 2+ on the left side.        Dorsalis pedis pulses are 2+ on the right side and 2+ on the left side.        Posterior tibial pulses are 2+ on the right side and 2+ on the left side.      Heart sounds: Normal heart sounds. No murmur heard.     Comments: Varicosities to bilateral lower feet, left > right  Pulmonary:      Effort: Pulmonary effort is normal. No respiratory " distress.      Breath sounds: Normal breath sounds.   Abdominal:      General: Bowel sounds are normal. There is no distension.      Palpations: Abdomen is soft.      Tenderness: There is no abdominal tenderness. There is no guarding.   Musculoskeletal:         General: No swelling or tenderness. Normal range of motion.      Cervical back: Normal range of motion and neck supple.      Right lower leg: No edema.      Left lower leg: No edema.   Feet:      Right foot:      Skin integrity: Skin integrity normal.      Left foot:      Skin integrity: Skin integrity normal.   Skin:     General: Skin is warm and dry.      Findings: No erythema or rash.   Neurological:      General: No focal deficit present.      Mental Status: He is alert and oriented to person, place, and time. Mental status is at baseline.      Cranial Nerves: No cranial nerve deficit.      Gait: Gait normal.   Psychiatric:         Attention and Perception: Attention normal.         Mood and Affect: Mood normal.         No results found.    Patient Active Problem List   Diagnosis    Essential hypertension    Type 2 diabetes mellitus without complication, without long-term current use of insulin    Other hyperlipidemia         ICD-10-CM ICD-9-CM   1. Chronic venous hypertension with ulcer and inflammation involving both sides  I87.333 459.33   2. Essential hypertension  I10 401.9   3. Other hyperlipidemia  E78.49 272.4   4. Type 2 diabetes mellitus without complication, without long-term current use of insulin  E11.9 250.00           Plan: After thoroughly evaluating James Mar, I believe the best course of action is to remain conservative from vascular surgery standpoint.  We will see this patient back in 2 weeks with noninvasive testing to include venous duplex.  Due to the inability to determine reflux in the greater saphenous vein on the left lower extremity with the [facility] testing, I will reorder venous duplex here to determine if  intervention is needed.  He will continue to wear his compression stockings daily, elevate his legs when not on them, and moisturize his legs well.  The wound to the left lateral lower extremity is being followed by wound care, it is healed.  He is maintained on aspirin 81 mg daily and Lipitor 80 mg nightly.  The patient can continue taking their current medication regimen as previously planned.  I did discuss vascular risk factors as they pertain to the progression of vascular disease including controlling hypertension, hyperlipidemia, and diabetes, in addition to smoking cessation.  All risk factors are stable on current medication regimen.  Unfortunately, he has tried many avenues to attempt quitting smoking and has been unsuccessful.  I did  him for 3 minutes on the importance of quitting smoking and how it affects his vascular health.  This was all discussed in full with complete understanding.    Thank you for allowing me to participate in the care of your patient.  Please do not hesitate with any questions or concerns.  I will keep you aware of any further encounters with James Mar.        Sincerely yours,         SHARITA Cueto

## 2024-03-08 ENCOUNTER — OFFICE VISIT (OUTPATIENT)
Dept: WOUND CARE | Facility: HOSPITAL | Age: 49
End: 2024-03-08
Payer: COMMERCIAL

## 2024-03-08 PROCEDURE — G0463 HOSPITAL OUTPT CLINIC VISIT: HCPCS

## 2024-03-16 DIAGNOSIS — E11.9 TYPE 2 DIABETES MELLITUS WITHOUT COMPLICATION, WITHOUT LONG-TERM CURRENT USE OF INSULIN (HCC): ICD-10-CM

## 2024-03-18 RX ORDER — BLOOD SUGAR DIAGNOSTIC
STRIP MISCELLANEOUS
Qty: 100 STRIP | Refills: 3 | Status: SHIPPED | OUTPATIENT
Start: 2024-03-18

## 2024-03-18 RX ORDER — ATORVASTATIN CALCIUM 80 MG/1
80 TABLET, FILM COATED ORAL NIGHTLY
Qty: 30 TABLET | Refills: 2 | Status: SHIPPED | OUTPATIENT
Start: 2024-03-18

## 2024-03-18 RX ORDER — GLIPIZIDE 10 MG/1
TABLET ORAL
Qty: 60 TABLET | Refills: 3 | Status: SHIPPED | OUTPATIENT
Start: 2024-03-18

## 2024-03-18 RX ORDER — PRASUGREL 10 MG/1
TABLET, FILM COATED ORAL
Qty: 30 TABLET | Refills: 2 | Status: SHIPPED | OUTPATIENT
Start: 2024-03-18

## 2024-03-18 RX ORDER — ASPIRIN 81 MG/1
TABLET, COATED ORAL
Qty: 30 TABLET | Refills: 2 | Status: SHIPPED | OUTPATIENT
Start: 2024-03-18

## 2024-03-18 NOTE — TELEPHONE ENCOUNTER
Last OV 2/23/2024  Next OV 3/16/2024      Requested Prescriptions     Pending Prescriptions Disp Refills    atorvastatin (LIPITOR) 80 MG tablet [Pharmacy Med Name: ATORVASTATIN CALCIUM 80MG TABS] 30 tablet 2     Sig: TAKE ONE TABLET BY MOUTH EVERY EVENING    prasugrel (EFFIENT) 10 MG TABS [Pharmacy Med Name: PRASUGREL HCL 10MG TABS] 30 tablet 2     Sig: TAKE ONE TABLET BY MOUTH EVERY DAY    blood glucose test strips (ONETOUCH ULTRA) strip [Pharmacy Med Name: [employer] ULTRA  STRP] 100 strip 3     Sig: TEST BLOOD SUGAR TWO TIMES A DAY AND AS NEEDED FOR IRREGULAR BLOOD GLUCOSE    glipiZIDE (GLUCOTROL) 10 MG tablet [Pharmacy Med Name: GLIPIZIDE 10MG TABS] 60 tablet 3     Sig: TAKE ONE TABLET BY MOUTH TWICE A DAY    metFORMIN (GLUCOPHAGE) 500 MG tablet [Pharmacy Med Name: METFORMIN HCL 500MG TABS] 180 tablet 0     Sig: TAKE ONE TABLET BY MOUTH TWICE A DAY WITH MEALS

## 2024-03-19 RX ORDER — LOSARTAN POTASSIUM 50 MG/1
50 TABLET ORAL DAILY
Qty: 30 TABLET | Refills: 3 | Status: SHIPPED | OUTPATIENT
Start: 2024-03-19

## 2024-03-19 RX ORDER — METOPROLOL SUCCINATE 50 MG/1
50 TABLET, EXTENDED RELEASE ORAL DAILY
Qty: 30 TABLET | Refills: 3 | Status: SHIPPED | OUTPATIENT
Start: 2024-03-19

## 2024-03-19 NOTE — TELEPHONE ENCOUNTER
Last OV 2/23/2024  Next OV 5/23/2024      Requested Prescriptions     Pending Prescriptions Disp Refills    losartan (COZAAR) 50 MG tablet [Pharmacy Med Name: LOSARTAN POTASSIUM 50MG TABS] 30 tablet 3     Sig: TAKE ONE TABLET BY MOUTH EVERY DAY    metoprolol succinate (TOPROL XL) 50 MG extended release tablet [Pharmacy Med Name: METOPROLOL SUCCINATE ER 50MG TB24] 30 tablet 3     Sig: TAKE ONE TABLET BY MOUTH EVERY DAY

## 2024-03-20 ENCOUNTER — TELEPHONE (OUTPATIENT)
Dept: VASCULAR SURGERY | Facility: CLINIC | Age: 49
End: 2024-03-20
Payer: COMMERCIAL

## 2024-03-20 NOTE — TELEPHONE ENCOUNTER
Call placed t patient and reminded of appointments on 3/21/2024 and patient has voiced understanding.

## 2024-03-21 ENCOUNTER — OFFICE VISIT (OUTPATIENT)
Dept: VASCULAR SURGERY | Facility: CLINIC | Age: 49
End: 2024-03-21
Payer: COMMERCIAL

## 2024-03-21 ENCOUNTER — HOSPITAL ENCOUNTER (OUTPATIENT)
Dept: ULTRASOUND IMAGING | Facility: HOSPITAL | Age: 49
Discharge: HOME | End: 2024-03-21
Payer: COMMERCIAL

## 2024-03-21 VITALS
HEART RATE: 86 BPM | OXYGEN SATURATION: 97 % | WEIGHT: 265 LBS | SYSTOLIC BLOOD PRESSURE: 138 MMHG | BODY MASS INDEX: 32.95 KG/M2 | DIASTOLIC BLOOD PRESSURE: 80 MMHG | HEIGHT: 75 IN

## 2024-03-21 DIAGNOSIS — I87.333 CHRONIC VENOUS HYPERTENSION WITH ULCER AND INFLAMMATION INVOLVING BOTH SIDES: Primary | ICD-10-CM

## 2024-03-21 DIAGNOSIS — I10 ESSENTIAL HYPERTENSION: ICD-10-CM

## 2024-03-21 DIAGNOSIS — E11.9 TYPE 2 DIABETES MELLITUS WITHOUT COMPLICATION, WITHOUT LONG-TERM CURRENT USE OF INSULIN: ICD-10-CM

## 2024-03-21 DIAGNOSIS — I87.333 CHRONIC VENOUS HYPERTENSION WITH ULCER AND INFLAMMATION INVOLVING BOTH SIDES: ICD-10-CM

## 2024-03-21 DIAGNOSIS — E78.49 OTHER HYPERLIPIDEMIA: ICD-10-CM

## 2024-03-21 DIAGNOSIS — Z01.818 PREOP TESTING: ICD-10-CM

## 2024-03-21 PROCEDURE — 93970 EXTREMITY STUDY: CPT

## 2024-03-21 NOTE — PROGRESS NOTES
3/21/2024        Radha Mortensen APRN  [address] [place] [address] Drive [address]  [facility] [address]      James Mar  1975    Chief Complaint   Patient presents with    Follow-up     2 week follow up w/ testing. Last seen 3/7/24. Patient denies any changes since last visit. Patient does not have list bof meds, bust states no changes since last visit.        Dear SHARITA Stringer:      HPI  I had the pleasure of seeing your patient James Mar in the office today.  As you recall, James Mar is a 48 y.o.  male who you are currently following for routine health maintenance.  He returns today after undergoing a venous duplex.  He did have a previous VVI performed at [facility], unfortunately the results do not provide sufficient information in regard to the greater saphenous vein on his left lower extremity.  He did have a superficial wound to the lateral aspect of his left lower extremity.  He is currently wearing compression stockings.  He does complain of pain at rest in addition to swelling, left greater than right.  He also states that he has pain in his feet when walking.  He is maintained on aspirin and Lipitor.  He did have noninvasive testing performed today, which I did review in office.        Past Medical History:   Diagnosis Date    Cellulitis and abscess of left leg     Class 1 obesity     Diabetes mellitus     DVT (deep venous thrombosis)     Hyperlipidemia     Hypertension     Tobacco abuse        Past Surgical History:   Procedure Laterality Date    APPENDECTOMY      CORONARY STENT PLACEMENT  2023    [facility]    LAPAROSCOPIC CHOLECYSTECTOMY         Family History   Problem Relation Age of Onset    Hypertension Mother     Heart attack Father     Heart attack Maternal Uncle     Heart attack Paternal Uncle        Social History     Socioeconomic History    Marital status:      Spouse name: Mrs. Patrick Mar    Number of children: 3   Tobacco Use    Smoking status: Every  "Day     Current packs/day: 1.00     Types: Cigarettes     Passive exposure: Past    Smokeless tobacco: Never   Vaping Use    Vaping status: Never Used   Substance and Sexual Activity    Alcohol use: Yes     Comment: rare special occasions    Drug use: Yes     Types: Marijuana     Comment: every now and then    Sexual activity: Defer     Partners: Female       No Known Allergies    Current Outpatient Medications   Medication Instructions    albuterol sulfate  (90 Base) MCG/ACT inhaler 2 puffs, Inhalation, Every 4 Hours PRN    aspirin 81 mg, Oral, Daily    atorvastatin (LIPITOR) 80 mg, Oral, Daily    furosemide (LASIX) 20 mg, Oral, 2 Times Daily PRN    glipizide (GLUCOTROL) 10 mg, Oral, 2 Times Daily Before Meals    losartan (COZAAR) 50 mg, Oral, Daily    metFORMIN (GLUCOPHAGE) 500 mg, Oral, 2 Times Daily With Meals    metoprolol succinate XL (TOPROL-XL) 50 mg, Oral, Daily    ofloxacin (OCUFLOX) 0.3 % ophthalmic solution 1 drop, Both Eyes, 4 Times Daily PRN    prasugrel (EFFIENT) 10 mg, Oral, Daily    SEMAGLUTIDE, 1 MG/DOSE, SC 0.5 mg, Subcutaneous, Weekly, Sunday    sodium hypochlorite (DAKIN'S 1/4 STRENGTH) 0.125 % solution topical solution 0.125% 1 mL, Topical, 2 Times Daily    varenicline (CHANTIX) 1 mg, Oral, 2 Times Daily       Review of Systems   Constitutional: Negative.  Negative for diaphoresis and fever.   HENT: Negative.     Eyes: Negative.    Respiratory: Negative.  Negative for shortness of breath and wheezing.    Cardiovascular:  Positive for leg swelling. Negative for chest pain.   Gastrointestinal: Negative.  Negative for abdominal pain.   Endocrine: Negative.    Genitourinary: Negative.    Musculoskeletal: Negative.    Skin: Negative.    Allergic/Immunologic: Negative.    Neurological: Negative.  Negative for dizziness and weakness.   Hematological: Negative.    Psychiatric/Behavioral: Negative.         /80   Pulse 86   Ht 190.5 cm (75\")   Wt 120 kg (265 lb)   SpO2 97%   BMI 33.12 " kg/m²       Physical Exam  Vitals and nursing note reviewed.   Constitutional:       General: He is not in acute distress.     Appearance: Normal appearance. He is obese. He is not diaphoretic.   HENT:      Head: Normocephalic. No right periorbital erythema or left periorbital erythema.      Nose: Nose normal.   Eyes:      General: No scleral icterus.     Pupils: Pupils are equal.   Cardiovascular:      Rate and Rhythm: Normal rate and regular rhythm.      Pulses: Normal pulses.      Heart sounds: Normal heart sounds. No murmur heard.     Comments: Varicosities to bilateral lower feet left > right  Pulmonary:      Effort: Pulmonary effort is normal. No respiratory distress.      Breath sounds: Normal breath sounds.   Abdominal:      General: Bowel sounds are normal. There is no distension.      Palpations: Abdomen is soft.      Tenderness: There is no abdominal tenderness. There is no guarding.   Musculoskeletal:         General: No swelling or tenderness. Normal range of motion.      Cervical back: Normal range of motion and neck supple.      Right lower leg: No edema.      Left lower leg: Edema present.   Feet:      Right foot:      Skin integrity: Skin integrity normal.      Left foot:      Skin integrity: Skin integrity normal.   Skin:     General: Skin is warm and dry.      Findings: Wound present. No erythema or rash.             Comments: Scabbed area   Neurological:      General: No focal deficit present.      Mental Status: He is alert and oriented to person, place, and time. Mental status is at baseline.      Cranial Nerves: No cranial nerve deficit.      Gait: Gait normal.   Psychiatric:         Attention and Perception: Attention normal.         Mood and Affect: Mood normal.           DIAGNOSTIC DATA      Patient Active Problem List   Diagnosis    Essential hypertension    Type 2 diabetes mellitus without complication, without long-term current use of insulin    Other hyperlipidemia         ICD-10-CM  ICD-9-CM   1. Chronic venous hypertension with ulcer and inflammation involving both sides  I87.333 459.33   2. Essential hypertension  I10 401.9   3. Other hyperlipidemia  E78.49 272.4   4. Type 2 diabetes mellitus without complication, without long-term current use of insulin  E11.9 250.00         Plan: After thoroughly evaluating James Mar, I believe the best course of action is to undergo a left lower extremity radiofrequency ablation.  He did have noninvasive testing performed today and his VVI did show venous insufficiency to bilateral lower extremities in the greater saphenous vein. Risks of radiofrequency ablation include, but are not limited to, bleeding, infection, vessel damage, nerve damage, DVT, phlebitis, and pulmonary embolus.  The patient understands these risks and wishes to proceed with procedure.  He is maintained on aspirin 81 mg daily and Lipitor 80 mg nightly and will continue these medications uninterrupted up until the day of surgery.  He can continue taking his current medication regimen as previously planned.  I did discuss vascular risk factors as they pertain to the progression of vascular disease including controlling hypertension, hyperlipidemia, and diabetes, in addition to smoking cessation.  His blood pressure is stable today in office.  His most recent hemoglobin A1c was 6.8% on 2/22/2024.  His lipid panel was normal except for total cholesterol was 137 and HDL was 51 on 2/22/2024.  Unfortunately, he has tried many avenues to attempt quitting smoking and has been unsuccessful.  I did  him for 3 minutes on the importance of quitting smoking and how it affects his vascular health.  This was all discussed in full with complete understanding.    Thank you for allowing me to participate in the care of your patient.  Please do not hesitate with any questions or concerns.  I will keep you aware of any further encounters with James Mar.        Sincerely yours,         Teri  POLO Newberry, APRN

## 2024-03-25 DIAGNOSIS — F17.200 SMOKER: ICD-10-CM

## 2024-03-25 DIAGNOSIS — Z71.6 ENCOUNTER FOR SMOKING CESSATION COUNSELING: ICD-10-CM

## 2024-03-25 PROBLEM — Z01.818 PREOP TESTING: Status: ACTIVE | Noted: 2024-03-21

## 2024-03-25 PROBLEM — I87.333 CHRONIC VENOUS HYPERTENSION WITH ULCER AND INFLAMMATION INVOLVING BOTH SIDES: Status: ACTIVE | Noted: 2024-03-21

## 2024-03-25 RX ORDER — VARENICLINE TARTRATE 0.5 (11)-1
KIT ORAL
Qty: 1 EACH | Refills: 0 | OUTPATIENT
Start: 2024-03-25

## 2024-03-25 NOTE — TELEPHONE ENCOUNTER
This is the starter pack. He should have finished this. Check with patient. I thought this was started back in December. We need to send in the maintenance dose.    Dr. Tadeo Dr. Justine Tadeo

## 2024-03-25 NOTE — TELEPHONE ENCOUNTER
Last OV 2/23/2024  Next OV 5/23/2024      Requested Prescriptions     Pending Prescriptions Disp Refills    Varenicline Tartrate, Starter, (CHANTIX STARTING MONTH PAK) 0.5 MG X 11 & 1 MG X 42 TBPK 1 each 0     Sig: Take per pack directions

## 2024-03-26 RX ORDER — VARENICLINE TARTRATE 1 MG/1
1 TABLET, FILM COATED ORAL 2 TIMES DAILY
Qty: 60 TABLET | Refills: 3 | Status: SHIPPED | OUTPATIENT
Start: 2024-03-26

## 2024-04-09 ENCOUNTER — PRE-ADMISSION TESTING (OUTPATIENT)
Dept: PREADMISSION TESTING | Facility: HOSPITAL | Age: 49
End: 2024-04-09
Payer: COMMERCIAL

## 2024-04-09 VITALS
HEIGHT: 73 IN | BODY MASS INDEX: 35.3 KG/M2 | DIASTOLIC BLOOD PRESSURE: 81 MMHG | WEIGHT: 266.32 LBS | SYSTOLIC BLOOD PRESSURE: 129 MMHG | RESPIRATION RATE: 18 BRPM | HEART RATE: 71 BPM | OXYGEN SATURATION: 100 %

## 2024-04-09 DIAGNOSIS — Z01.818 PREOP TESTING: ICD-10-CM

## 2024-04-09 DIAGNOSIS — I87.333 CHRONIC VENOUS HYPERTENSION WITH ULCER AND INFLAMMATION INVOLVING BOTH SIDES: ICD-10-CM

## 2024-04-09 LAB
ANION GAP SERPL CALCULATED.3IONS-SCNC: 11 MMOL/L (ref 5–15)
BUN SERPL-MCNC: 12 MG/DL (ref 6–20)
BUN/CREAT SERPL: 15.8 (ref 7–25)
CALCIUM SPEC-SCNC: 9.8 MG/DL (ref 8.6–10.5)
CHLORIDE SERPL-SCNC: 103 MMOL/L (ref 98–107)
CO2 SERPL-SCNC: 27 MMOL/L (ref 22–29)
CREAT SERPL-MCNC: 0.76 MG/DL (ref 0.76–1.27)
DEPRECATED RDW RBC AUTO: 44.1 FL (ref 37–54)
EGFRCR SERPLBLD CKD-EPI 2021: 110.2 ML/MIN/1.73
ERYTHROCYTE [DISTWIDTH] IN BLOOD BY AUTOMATED COUNT: 13.6 % (ref 12.3–15.4)
GLUCOSE SERPL-MCNC: 135 MG/DL (ref 65–99)
HCT VFR BLD AUTO: 45.2 % (ref 37.5–51)
HGB BLD-MCNC: 15.3 G/DL (ref 13–17.7)
MCH RBC QN AUTO: 29.9 PG (ref 26.6–33)
MCHC RBC AUTO-ENTMCNC: 33.8 G/DL (ref 31.5–35.7)
MCV RBC AUTO: 88.5 FL (ref 79–97)
PLATELET # BLD AUTO: 295 10*3/MM3 (ref 140–450)
PMV BLD AUTO: 10.3 FL (ref 6–12)
POTASSIUM SERPL-SCNC: 3.8 MMOL/L (ref 3.5–5.2)
QT INTERVAL: 410 MS
QTC INTERVAL: 429 MS
RBC # BLD AUTO: 5.11 10*6/MM3 (ref 4.14–5.8)
SODIUM SERPL-SCNC: 141 MMOL/L (ref 136–145)
WBC NRBC COR # BLD AUTO: 6.99 10*3/MM3 (ref 3.4–10.8)

## 2024-04-09 PROCEDURE — 93005 ELECTROCARDIOGRAM TRACING: CPT

## 2024-04-09 PROCEDURE — 80048 BASIC METABOLIC PNL TOTAL CA: CPT

## 2024-04-09 PROCEDURE — 36415 COLL VENOUS BLD VENIPUNCTURE: CPT

## 2024-04-09 PROCEDURE — 85027 COMPLETE CBC AUTOMATED: CPT

## 2024-04-09 NOTE — DISCHARGE INSTRUCTIONS
Preparing for Surgery  Follow these instructions before the procedure:  Several days or weeks before your procedure  Medication(s) you need to stop 1 week prior to surgery: SEMAGLUTIDE       Ask your health care provider about:  Changing or stopping your regular medicines. This is especially important if you are taking diabetes medicines or blood thinners.  Taking medicines such as aspirin and ibuprofen. These medicines can thin your blood. Do not take these medicines unless your health care provider tells you to take them.  Taking over-the-counter medicines, vitamins, herbs, and supplements.    Contact your surgeon if you:  Develop a fever of more than 100.4°F (38°C) or other feelings of illness during the 48 hours before your surgery.  Have symptoms that get worse.  Have questions or concerns about your surgery.  If you are going home the same day of your surgery you will need to arrange for a responsible adult, age 18 years old or older, to drive you home from the hospital and stay with you for 24 hours. Verification of the  will be made prior to any procedure requiring sedation. You may not go home in a taxi or any form of public transportation by yourself.     Day before your procedure  Medication(s) you need to stop the day before your surgery:LOSARTAN     24 hours before your procedure DO NOT drink alcoholic beverages or smoke.  24 hours before your procedure STOP taking Erectile Dysfunction medication (i.e.,Cialis, Viagra)   You may be asked to shower with a germ-killing soap.  Day of your procedure   You may take the following medication(s) the morning of surgery with a sip of water: METOPROLOL       8 hours before your procedure STOP all food, any dairy products, and full liquids. This includes hard candy, chewing gum or mints. This is extremely important to prevent serious complications.   Up to 2 hours before your scheduled arrival time, you may have clear liquids no cream, powder, or pulp of any  kind. Safe options are water, black coffee, plain tea, soda, Gatorade/Powerade, clear broth, apple juice.  2 hours before your scheduled arrival time, STOP drinking clear liquids.  You may need to take another shower with a germ-killing soap before you leave home in the morning. Do not use perfumes, colognes, or body lotions.  Wear comfortable loose-fitting clothing.  Remove all jewelry including body piercing and rings, dark colored nail polish, and make up prior to arrival at the hospital. Leave all valuables at home.   Bring your hearing aids if you rely on them.  Do not wear contact lenses. If you wear eyeglasses remember to bring a case to store them in while you are in surgery.  Do not use denture adhesives since you will be asked to remove them during your surgery.    You do not need to bring your home medications into the hospital.   Bring your sleep apnea device with you on the day of your surgery (if this applies to you).  If you wear portable oxygen, bring it with you.   If you are staying overnight, you may bring a bag of items you may need such as slippers, robe and a change of clothes for your discharge. You may want to leave these items in the car until you are ready for them since your family will take your belongings when you leave the pre-operative area.  Arrive at the hospital as scheduled by the office. You will be asked to arrive 2 hours prior to your surgery time in order to prepare for your procedure.  When you arrive at the hospital  Go to the registration desk located at the main entrance of the hospital.  After registration is completed, you will be given a beeper and a sticker sheet. Take the stickers to Outpatient Surgery and place in the tray at the end of the desk to notify the staff that you have arrived and registered.   Return to the lobby to wait. You are not always called back according to the time of arrival but rather the time your doctor will be ready.  When your beeper lights  up and vibrates proceed through the double doors, under the stairs, and a member of the Outpatient Surgery staff will escort you to your preoperative room.       __________________________________________________________________________________________________________________________      How to Use Chlorhexidine Before Surgery  Chlorhexidine gluconate (CHG) is a germ-killing (antiseptic) solution that is used to clean the skin. It can get rid of the bacteria that normally live on the skin and can keep them away for about 24 hours. To clean your skin with CHG, you may be given:  A CHG solution to use in the shower or as part of a sponge bath.  A prepackaged cloth that contains CHG.  Cleaning your skin with CHG may help lower the risk for infection:  While you are staying in the intensive care unit of the hospital.  If you have a vascular access, such as a central line, to provide short-term or long-term access to your veins.  If you have a catheter to drain urine from your bladder.  If you are on a ventilator. A ventilator is a machine that helps you breathe by moving air in and out of your lungs.  After surgery.  What are the risks?  Risks of using CHG include:  A skin reaction.  Hearing loss, if CHG gets in your ears and you have a perforated eardrum.  Eye injury, if CHG gets in your eyes and is not rinsed out.  The CHG product catching fire.  Make sure that you avoid smoking and flames after applying CHG to your skin.  Do not use CHG:  If you have a chlorhexidine allergy or have previously reacted to chlorhexidine.  On babies younger than 2 months of age.  How to use CHG solution  Use CHG only as told by your health care provider, and follow the instructions on the label.  Use the full amount of CHG as directed. Usually, this is one bottle.  During a shower    Follow these steps when using CHG solution during a shower (unless your health care provider gives you different instructions):  Start the shower.  Use  your normal soap and shampoo to wash your face and hair.  Turn off the shower or move out of the shower stream.  Pour the CHG onto a clean washcloth. Do not use any type of brush or rough-edged sponge.  Starting at your neck, lather your body down to your toes. Make sure you follow these instructions:  If you will be having surgery, pay special attention to the part of your body where you will be having surgery. Scrub this area for at least 1 minute.  Do not use CHG on your head or face. If the solution gets into your ears or eyes, rinse them well with water.  Avoid your genital area.  Avoid any areas of skin that have broken skin, cuts, or scrapes.  Scrub your back and under your arms. Make sure to wash skin folds.  Let the lather sit on your skin for 1-2 minutes or as long as told by your health care provider.  Thoroughly rinse your entire body in the shower. Make sure that all body creases and crevices are rinsed well.  Dry off with a clean towel. Do not put any substances on your body afterward--such as powder, lotion, or perfume--unless you are told to do so by your health care provider. Only use lotions that are recommended by the .  Put on clean clothes or pajamas.  If it is the night before your surgery, sleep in clean sheets.     During a sponge bath  Follow these steps when using CHG solution during a sponge bath (unless your health care provider gives you different instructions):  Use your normal soap and shampoo to wash your face and hair.  Pour the CHG onto a clean washcloth.  Starting at your neck, lather your body down to your toes. Make sure you follow these instructions:  If you will be having surgery, pay special attention to the part of your body where you will be having surgery. Scrub this area for at least 1 minute.  Do not use CHG on your head or face. If the solution gets into your ears or eyes, rinse them well with water.  Avoid your genital area.  Avoid any areas of skin that  have broken skin, cuts, or scrapes.  Scrub your back and under your arms. Make sure to wash skin folds.  Let the lather sit on your skin for 1-2 minutes or as long as told by your health care provider.  Using a different clean, wet washcloth, thoroughly rinse your entire body. Make sure that all body creases and crevices are rinsed well.  Dry off with a clean towel. Do not put any substances on your body afterward--such as powder, lotion, or perfume--unless you are told to do so by your health care provider. Only use lotions that are recommended by the .  Put on clean clothes or pajamas.  If it is the night before your surgery, sleep in clean sheets.  How to use CHG prepackaged cloths  Only use CHG cloths as told by your health care provider, and follow the instructions on the label.  Use the CHG cloth on clean, dry skin.  Do not use the CHG cloth on your head or face unless your health care provider tells you to.  When washing with the CHG cloth:  Avoid your genital area.  Avoid any areas of skin that have broken skin, cuts, or scrapes.  Before surgery    Follow these steps when using a CHG cloth to clean before surgery (unless your health care provider gives you different instructions):  Using the CHG cloth, vigorously scrub the part of your body where you will be having surgery. Scrub using a back-and-forth motion for 3 minutes. The area on your body should be completely wet with CHG when you are done scrubbing.  Do not rinse. Discard the cloth and let the area air-dry. Do not put any substances on the area afterward, such as powder, lotion, or perfume.  Put on clean clothes or pajamas.  If it is the night before your surgery, sleep in clean sheets.     For general bathing  Follow these steps when using CHG cloths for general bathing (unless your health care provider gives you different instructions).  Use a separate CHG cloth for each area of your body. Make sure you wash between any folds of skin  and between your fingers and toes. Wash your body in the following order, switching to a new cloth after each step:  The front of your neck, shoulders, and chest.  Both of your arms, under your arms, and your hands.  Your stomach and groin area, avoiding the genitals.  Your right leg and foot.  Your left leg and foot.  The back of your neck, your back, and your buttocks.  Do not rinse. Discard the cloth and let the area air-dry. Do not put any substances on your body afterward--such as powder, lotion, or perfume--unless you are told to do so by your health care provider. Only use lotions that are recommended by the .  Put on clean clothes or pajamas.  Contact a health care provider if:  Your skin gets irritated after scrubbing.  You have questions about using your solution or cloth.  You swallow any chlorhexidine. Call your local poison control center (1-457.570.8649 in the [place]  Get help right away if:  Your eyes itch badly, or they become very red or swollen.  Your skin itches badly and is red or swollen.  Your hearing changes.  You have trouble seeing.  You have swelling or tingling in your mouth or throat.  You have trouble breathing.  These symptoms may represent a serious problem that is an emergency. Do not wait to see if the symptoms will go away. Get medical help right away. Call your local emergency services (856 in the [place] Do not drive yourself to the hospital.  Summary  Chlorhexidine gluconate (CHG) is a germ-killing (antiseptic) solution that is used to clean the skin. Cleaning your skin with CHG may help to lower your risk for infection.  You may be given CHG to use for bathing. It may be in a bottle or in a prepackaged cloth to use on your skin. Carefully follow your health care provider's instructions and the instructions on the product label.  Do not use CHG if you have a chlorhexidine allergy.  Contact your health care provider if your skin gets irritated after scrubbing.  This  information is not intended to replace advice given to you by your health care provider. Make sure you discuss any questions you have with your health care provider.  Document Revised: 04/17/2023 Document Reviewed: 02/28/2022  Elsevier Patient Education © 2023 [employer]

## 2024-04-10 ENCOUNTER — TELEPHONE (OUTPATIENT)
Dept: CARDIOLOGY CLINIC | Age: 49
End: 2024-04-10

## 2024-04-10 NOTE — TELEPHONE ENCOUNTER
Date: 4/19/24    Cardiologist: James    Procedure: Left Lower Extremity Radiofrequency Ablation    Surgeon: Anastacio    Last Office Visit: 8/7/23  Reason for office visit and medical concerns addressed at this office visit: dm, dvt, pvd, cad, htn, hyperlipidemia    Testing Performed and Date of Service:  12/22/23 Cath  Diagnostic procedure:DCA, Coronary Angiogram, Left Heart Cath, Left   Ventricular Pressure Measurement, Other Diagnostic Procedure, IVUS      PCI procedure:LAD, LAD, PTCA, ARVIND      Conclusions      Successful intervention of proximal LAD stent thrombosis using   thrombectomy, balloon angioplasty.    RCRI = 6.6   METs 4    Current Medications: chantix, ozempic, effient, metoprolol, metformin, losartan, glipizide, lasix, atorvastatin, aspirin    Is the patient currently taking an anticoagulant? If so, what is the diagnosis the patient has been given to warrant the need for the anticoagulant? Effient for ARVIND 12/22/23    Additional Notes: requesting cardiac clearance

## 2024-04-12 ENCOUNTER — OFFICE VISIT (OUTPATIENT)
Dept: CARDIOLOGY CLINIC | Age: 49
End: 2024-04-12
Payer: MEDICAID

## 2024-04-12 VITALS
SYSTOLIC BLOOD PRESSURE: 134 MMHG | HEART RATE: 95 BPM | DIASTOLIC BLOOD PRESSURE: 80 MMHG | WEIGHT: 268 LBS | OXYGEN SATURATION: 100 % | HEIGHT: 75 IN | BODY MASS INDEX: 33.32 KG/M2

## 2024-04-12 DIAGNOSIS — I10 ESSENTIAL HYPERTENSION: ICD-10-CM

## 2024-04-12 DIAGNOSIS — I25.10 CORONARY ARTERY DISEASE INVOLVING NATIVE CORONARY ARTERY OF NATIVE HEART WITHOUT ANGINA PECTORIS: Primary | ICD-10-CM

## 2024-04-12 DIAGNOSIS — E78.5 DYSLIPIDEMIA: ICD-10-CM

## 2024-04-12 PROCEDURE — 99214 OFFICE O/P EST MOD 30 MIN: CPT | Performed by: NURSE PRACTITIONER

## 2024-04-12 PROCEDURE — 3075F SYST BP GE 130 - 139MM HG: CPT | Performed by: NURSE PRACTITIONER

## 2024-04-12 PROCEDURE — 3079F DIAST BP 80-89 MM HG: CPT | Performed by: NURSE PRACTITIONER

## 2024-04-12 PROCEDURE — G8417 CALC BMI ABV UP PARAM F/U: HCPCS | Performed by: NURSE PRACTITIONER

## 2024-04-12 PROCEDURE — G8427 DOCREV CUR MEDS BY ELIG CLIN: HCPCS | Performed by: NURSE PRACTITIONER

## 2024-04-12 PROCEDURE — 4004F PT TOBACCO SCREEN RCVD TLK: CPT | Performed by: NURSE PRACTITIONER

## 2024-04-12 ASSESSMENT — ENCOUNTER SYMPTOMS
WHEEZING: 0
COUGH: 0
SORE THROAT: 0
SHORTNESS OF BREATH: 0
CHEST TIGHTNESS: 0

## 2024-04-12 NOTE — PROGRESS NOTES
[facility] Cardiology   Established Patient Office Visit  [address]  [address]  [facility] [id number] [phone]        OFFICE VISIT:  2024    Duane Saunders - : 1975    Reason For Visit:  Duane is a 48 y.o. male who is here for Cardiac Clearance    1. Coronary artery disease involving native coronary artery of native heart without angina pectoris    2. Dyslipidemia    3. Essential hypertension        Patient with a history of coronary artery disease with previous stent placements, hyperlipidemia.    He is a patient of Dr. Ramirez.    Patient presents to clinic today patient denies any chest pain, pressure or tightness.  There is no shortness of breath, orthopnea or PND.  Patient denies any lightheadedness, dizziness or syncope.    Patient is scheduled on 2024 for left lower extremity radiofrequency ablation with Dr. Chaves.  Dr. Ramirez has approved clearance.      Subjective    Duane Saunders is a 48 y.o. male with the following history as recorded in [facility]:    Patient Active Problem List    Diagnosis Date Noted    STEMI (ST elevation myocardial infarction) ([facility]) 2023    NSTEMI (non-ST elevation myocardial infarction) ([facility]) 2023    Diabetes ([facility]) 2023    Chest pain 02/10/2023    PVD (peripheral vascular disease) ([facility]) 2024    Swelling 2024    Venous insufficiency 2024    Skin ulcer of left lower leg with fat layer exposed ([facility]) 2024    Thrombosis 2023    DVT (deep venous thrombosis) ([facility]) 10/27/2014     Current Outpatient Medications   Medication Sig Dispense Refill    varenicline (CHANTIX) 1 MG tablet Take 1 tablet by mouth 2 times daily 60 tablet 3    losartan (COZAAR) 50 MG tablet TAKE ONE TABLET BY MOUTH EVERY DAY 30 tablet 3    metoprolol succinate (TOPROL XL) 50 MG extended release tablet TAKE ONE TABLET BY MOUTH EVERY DAY 30 tablet 3    atorvastatin (LIPITOR) 80 MG tablet TAKE ONE TABLET BY MOUTH EVERY EVENING 30 tablet 2

## 2024-04-17 LAB
QT INTERVAL: 410 MS
QTC INTERVAL: 429 MS

## 2024-04-18 ENCOUNTER — TELEPHONE (OUTPATIENT)
Dept: VASCULAR SURGERY | Facility: CLINIC | Age: 49
End: 2024-04-18
Payer: COMMERCIAL

## 2024-04-18 NOTE — TELEPHONE ENCOUNTER
Pt expressed understanding of arrival time of 7 am for procedure scheduled with Dr. Ch on 04/19/24.  Pt advised NPO after midnight.

## 2024-04-18 NOTE — H&P
4/18/2024            Radha Mortensen APRN  [address] [place] [address] [address]  [facility] [address]        James Mar  1975          Chief Complaint   Patient presents with    Follow-up       2 week follow up w/ testing. Last seen 3/7/24. Patient denies any changes since last visit. Patient does not have list bof meds, bust states no changes since last visit.          Dear SHARITA Stringer:        HPI  I had the pleasure of seeing your patient James Mar in the office today.  As you recall, James Mar is a 48 y.o.  male who you are currently following for routine health maintenance.  He returns today after undergoing a venous duplex.  He did have a previous VVI performed at [facility], unfortunately the results do not provide sufficient information in regard to the greater saphenous vein on his left lower extremity.  He did have a superficial wound to the lateral aspect of his left lower extremity.  He is currently wearing compression stockings.  He does complain of pain at rest in addition to swelling, left greater than right.  He also states that he has pain in his feet when walking.  He is maintained on aspirin and Lipitor.  He did have noninvasive testing performed today, which I did review in office.          Past Medical History:   Diagnosis Date    Cellulitis and abscess of left leg     Class 1 obesity     Diabetes mellitus     DVT (deep venous thrombosis)     Hyperlipidemia     Hypertension     MRSA infection     Tobacco abuse      Past Surgical History:   Procedure Laterality Date    APPENDECTOMY      CORONARY STENT PLACEMENT  2023    [facility]    LAPAROSCOPIC CHOLECYSTECTOMY       Family History   Problem Relation Age of Onset    Hypertension Mother     Heart attack Father     Heart attack Maternal Uncle     Heart attack Paternal Uncle      Social History     Tobacco Use    Smoking status: Every Day     Current packs/day: 1.00     Types: Cigarettes     Passive exposure: Past     "Smokeless tobacco: Never   Vaping Use    Vaping status: Never Used   Substance Use Topics    Alcohol use: Yes     Comment: rare special occasions    Drug use: Yes     Types: Marijuana     Comment: every now and then     No Known Allergies  Current Outpatient Medications   Medication Instructions    albuterol sulfate  (90 Base) MCG/ACT inhaler 2 puffs, Inhalation, Every 4 Hours PRN    aspirin 81 mg, Oral, Daily    atorvastatin (LIPITOR) 80 mg, Oral, Daily    furosemide (LASIX) 20 mg, Oral, 2 Times Daily PRN    glipizide (GLUCOTROL) 10 mg, Oral, 2 Times Daily Before Meals    losartan (COZAAR) 50 mg, Oral, Daily    metFORMIN (GLUCOPHAGE) 500 mg, Oral, 2 Times Daily With Meals    metoprolol succinate XL (TOPROL-XL) 50 mg, Oral, Daily    ofloxacin (OCUFLOX) 0.3 % ophthalmic solution 1 drop, 4 Times Daily PRN    prasugrel (EFFIENT) 10 mg, Oral, Daily    SEMAGLUTIDE, 1 MG/DOSE, SC 0.5 mg, Subcutaneous, Weekly, Sunday    sodium hypochlorite (DAKIN'S 1/4 STRENGTH) 0.125 % solution topical solution 0.125% 1 mL, 2 Times Daily    varenicline (CHANTIX) 1 mg, Oral, 2 Times Daily          Review of Systems   Constitutional: Negative.  Negative for diaphoresis and fever.   HENT: Negative.     Eyes: Negative.    Respiratory: Negative.  Negative for shortness of breath and wheezing.    Cardiovascular:  Positive for leg swelling. Negative for chest pain.   Gastrointestinal: Negative.  Negative for abdominal pain.   Endocrine: Negative.    Genitourinary: Negative.    Musculoskeletal: Negative.    Skin: Negative.    Allergic/Immunologic: Negative.    Neurological: Negative.  Negative for dizziness and weakness.   Hematological: Negative.    Psychiatric/Behavioral: Negative.           /80   Pulse 86   Ht 190.5 cm (75\")   Wt 120 kg (265 lb)   SpO2 97%   BMI 33.12 kg/m²         Physical Exam  Vitals and nursing note reviewed.   Constitutional:       General: He is not in acute distress.     Appearance: Normal appearance. " He is obese. He is not diaphoretic.   HENT:      Head: Normocephalic. No right periorbital erythema or left periorbital erythema.      Nose: Nose normal.   Eyes:      General: No scleral icterus.     Pupils: Pupils are equal.   Cardiovascular:      Rate and Rhythm: Normal rate and regular rhythm.      Pulses: Normal pulses.      Heart sounds: Normal heart sounds. No murmur heard.     Comments: Varicosities to bilateral lower feet left > right  Pulmonary:      Effort: Pulmonary effort is normal. No respiratory distress.      Breath sounds: Normal breath sounds.   Abdominal:      General: Bowel sounds are normal. There is no distension.      Palpations: Abdomen is soft.      Tenderness: There is no abdominal tenderness. There is no guarding.   Musculoskeletal:         General: No swelling or tenderness. Normal range of motion.      Cervical back: Normal range of motion and neck supple.      Right lower leg: No edema.      Left lower leg: Edema present.   Feet:      Right foot:      Skin integrity: Skin integrity normal.      Left foot:      Skin integrity: Skin integrity normal.   Skin:     General: Skin is warm and dry.      Findings: Wound present. No erythema or rash.              Comments: Scabbed area   Neurological:      General: No focal deficit present.      Mental Status: He is alert and oriented to person, place, and time. Mental status is at baseline.      Cranial Nerves: No cranial nerve deficit.      Gait: Gait normal.   Psychiatric:         Attention and Perception: Attention normal.         Mood and Affect: Mood normal.               DIAGNOSTIC DATA       Problem List       Patient Active Problem List   Diagnosis    Essential hypertension    Type 2 diabetes mellitus without complication, without long-term current use of insulin    Other hyperlipidemia            Visit Diagnosis       ICD-10-CM ICD-9-CM   1. Chronic venous hypertension with ulcer and inflammation involving both sides  I87.333 459.33   2.  Essential hypertension  I10 401.9   3. Other hyperlipidemia  E78.49 272.4   4. Type 2 diabetes mellitus without complication, without long-term current use of insulin  E11.9 250.00               Plan: After thoroughly evaluating James Mar, I believe the best course of action is to undergo a left lower extremity radiofrequency ablation.  He did have noninvasive testing performed today and his VVI did show venous insufficiency to bilateral lower extremities in the greater saphenous vein. Risks of radiofrequency ablation include, but are not limited to, bleeding, infection, vessel damage, nerve damage, DVT, phlebitis, and pulmonary embolus.  The patient understands these risks and wishes to proceed with procedure.  He can continue taking his current medication regimen as previously planned.  I did discuss vascular risk factors as they pertain to the progression of vascular disease including controlling hypertension, hyperlipidemia, and diabetes, in addition to smoking cessation.  His blood pressure is stable today in office.  His most recent hemoglobin A1c was 6.8% on 2/22/2024.  His lipid panel was normal except for total cholesterol was 137 and HDL was 51 on 2/22/2024.  Unfortunately, he has tried many avenues to attempt quitting smoking and has been unsuccessful.  I did  him for 3 minutes on the importance of quitting smoking and how it affects his vascular health.  This was all discussed in full with complete understanding.     Thank you for allowing me to participate in the care of your patient.  Please do not hesitate with any questions or concerns.  I will keep you aware of any further encounters with James Mar.           Sincerely yours,           Theodore Ch, DO

## 2024-04-19 ENCOUNTER — APPOINTMENT (OUTPATIENT)
Dept: ULTRASOUND IMAGING | Facility: HOSPITAL | Age: 49
End: 2024-04-19
Payer: COMMERCIAL

## 2024-04-19 ENCOUNTER — ANESTHESIA (OUTPATIENT)
Dept: PERIOP | Facility: HOSPITAL | Age: 49
End: 2024-04-19
Payer: COMMERCIAL

## 2024-04-19 ENCOUNTER — HOSPITAL ENCOUNTER (OUTPATIENT)
Facility: HOSPITAL | Age: 49
Setting detail: HOSPITAL OUTPATIENT SURGERY
Discharge: HOME | End: 2024-04-19
Attending: SURGERY | Admitting: SURGERY
Payer: COMMERCIAL

## 2024-04-19 ENCOUNTER — ANESTHESIA EVENT (OUTPATIENT)
Dept: PERIOP | Facility: HOSPITAL | Age: 49
End: 2024-04-19
Payer: COMMERCIAL

## 2024-04-19 VITALS
OXYGEN SATURATION: 98 % | SYSTOLIC BLOOD PRESSURE: 114 MMHG | DIASTOLIC BLOOD PRESSURE: 75 MMHG | HEART RATE: 62 BPM | TEMPERATURE: 97.3 F | RESPIRATION RATE: 18 BRPM

## 2024-04-19 DIAGNOSIS — Z01.818 PREOP TESTING: ICD-10-CM

## 2024-04-19 DIAGNOSIS — I87.333 CHRONIC VENOUS HYPERTENSION WITH ULCER AND INFLAMMATION INVOLVING BOTH SIDES: ICD-10-CM

## 2024-04-19 LAB — GLUCOSE BLDC GLUCOMTR-MCNC: 164 MG/DL (ref 70–130)

## 2024-04-19 PROCEDURE — 25010000002 ONDANSETRON PER 1 MG: Performed by: NURSE ANESTHETIST, CERTIFIED REGISTERED

## 2024-04-19 PROCEDURE — 25010000002 DROPERIDOL PER 5 MG: Performed by: NURSE ANESTHETIST, CERTIFIED REGISTERED

## 2024-04-19 PROCEDURE — 25810000003 SODIUM CHLORIDE PER 500 ML: Performed by: SURGERY

## 2024-04-19 PROCEDURE — 36475 ENDOVENOUS RF 1ST VEIN: CPT | Performed by: SURGERY

## 2024-04-19 PROCEDURE — 25810000003 LACTATED RINGERS PER 1000 ML: Performed by: SURGERY

## 2024-04-19 PROCEDURE — 25010000002 FENTANYL CITRATE (PF) 50 MCG/ML SOLUTION: Performed by: NURSE ANESTHETIST, CERTIFIED REGISTERED

## 2024-04-19 PROCEDURE — 82948 REAGENT STRIP/BLOOD GLUCOSE: CPT

## 2024-04-19 PROCEDURE — 25010000002 PROPOFOL 1000 MG/100ML EMULSION: Performed by: NURSE ANESTHETIST, CERTIFIED REGISTERED

## 2024-04-19 PROCEDURE — 25010000002 CEFAZOLIN PER 500 MG: Performed by: NURSE PRACTITIONER

## 2024-04-19 PROCEDURE — C1888 ENDOVAS NON-CARDIAC ABL CATH: HCPCS | Performed by: SURGERY

## 2024-04-19 PROCEDURE — C1894 INTRO/SHEATH, NON-LASER: HCPCS | Performed by: SURGERY

## 2024-04-19 PROCEDURE — 76937 US GUIDE VASCULAR ACCESS: CPT

## 2024-04-19 PROCEDURE — 25810000003 LACTATED RINGERS PER 1000 ML: Performed by: ANESTHESIOLOGY

## 2024-04-19 RX ORDER — SODIUM CHLORIDE 9 MG/ML
INJECTION, SOLUTION INTRAVENOUS AS NEEDED
Status: DISCONTINUED | OUTPATIENT
Start: 2024-04-19 | End: 2024-04-19 | Stop reason: HOSPADM

## 2024-04-19 RX ORDER — HYDROCODONE BITARTRATE AND ACETAMINOPHEN 5; 325 MG/1; MG/1
1 TABLET ORAL EVERY 4 HOURS PRN
Status: DISCONTINUED | OUTPATIENT
Start: 2024-04-19 | End: 2024-04-19 | Stop reason: HOSPADM

## 2024-04-19 RX ORDER — ONDANSETRON 2 MG/ML
4 INJECTION INTRAMUSCULAR; INTRAVENOUS ONCE AS NEEDED
Status: DISCONTINUED | OUTPATIENT
Start: 2024-04-19 | End: 2024-04-19 | Stop reason: HOSPADM

## 2024-04-19 RX ORDER — ONDANSETRON 2 MG/ML
INJECTION INTRAMUSCULAR; INTRAVENOUS AS NEEDED
Status: DISCONTINUED | OUTPATIENT
Start: 2024-04-19 | End: 2024-04-19 | Stop reason: SURG

## 2024-04-19 RX ORDER — LABETALOL HYDROCHLORIDE 5 MG/ML
5 INJECTION, SOLUTION INTRAVENOUS
Status: DISCONTINUED | OUTPATIENT
Start: 2024-04-19 | End: 2024-04-19 | Stop reason: HOSPADM

## 2024-04-19 RX ORDER — SODIUM CHLORIDE, SODIUM LACTATE, POTASSIUM CHLORIDE, CALCIUM CHLORIDE 600; 310; 30; 20 MG/100ML; MG/100ML; MG/100ML; MG/100ML
100 INJECTION, SOLUTION INTRAVENOUS CONTINUOUS
Status: DISCONTINUED | OUTPATIENT
Start: 2024-04-19 | End: 2024-04-19 | Stop reason: HOSPADM

## 2024-04-19 RX ORDER — PROPOFOL 10 MG/ML
INJECTION, EMULSION INTRAVENOUS CONTINUOUS PRN
Status: DISCONTINUED | OUTPATIENT
Start: 2024-04-19 | End: 2024-04-19 | Stop reason: SURG

## 2024-04-19 RX ORDER — DEXMEDETOMIDINE HYDROCHLORIDE 4 UG/ML
INJECTION, SOLUTION INTRAVENOUS AS NEEDED
Status: DISCONTINUED | OUTPATIENT
Start: 2024-04-19 | End: 2024-04-19 | Stop reason: SURG

## 2024-04-19 RX ORDER — HYDROCODONE BITARTRATE AND ACETAMINOPHEN 10; 325 MG/1; MG/1
1 TABLET ORAL EVERY 4 HOURS PRN
Status: DISCONTINUED | OUTPATIENT
Start: 2024-04-19 | End: 2024-04-19 | Stop reason: HOSPADM

## 2024-04-19 RX ORDER — SODIUM CHLORIDE 0.9 % (FLUSH) 0.9 %
3 SYRINGE (ML) INJECTION AS NEEDED
Status: DISCONTINUED | OUTPATIENT
Start: 2024-04-19 | End: 2024-04-19 | Stop reason: HOSPADM

## 2024-04-19 RX ORDER — IBUPROFEN 600 MG/1
600 TABLET ORAL EVERY 6 HOURS PRN
Status: DISCONTINUED | OUTPATIENT
Start: 2024-04-19 | End: 2024-04-19 | Stop reason: HOSPADM

## 2024-04-19 RX ORDER — FENTANYL CITRATE 50 UG/ML
INJECTION, SOLUTION INTRAMUSCULAR; INTRAVENOUS AS NEEDED
Status: DISCONTINUED | OUTPATIENT
Start: 2024-04-19 | End: 2024-04-19 | Stop reason: SURG

## 2024-04-19 RX ORDER — MIDAZOLAM HYDROCHLORIDE 1 MG/ML
1 INJECTION INTRAMUSCULAR; INTRAVENOUS
Status: DISCONTINUED | OUTPATIENT
Start: 2024-04-19 | End: 2024-04-19 | Stop reason: HOSPADM

## 2024-04-19 RX ORDER — DROPERIDOL 2.5 MG/ML
0.62 INJECTION, SOLUTION INTRAMUSCULAR; INTRAVENOUS ONCE AS NEEDED
Status: DISCONTINUED | OUTPATIENT
Start: 2024-04-19 | End: 2024-04-19 | Stop reason: HOSPADM

## 2024-04-19 RX ORDER — DROPERIDOL 2.5 MG/ML
INJECTION, SOLUTION INTRAMUSCULAR; INTRAVENOUS AS NEEDED
Status: DISCONTINUED | OUTPATIENT
Start: 2024-04-19 | End: 2024-04-19 | Stop reason: SURG

## 2024-04-19 RX ORDER — SODIUM CHLORIDE, SODIUM LACTATE, POTASSIUM CHLORIDE, CALCIUM CHLORIDE 600; 310; 30; 20 MG/100ML; MG/100ML; MG/100ML; MG/100ML
1000 INJECTION, SOLUTION INTRAVENOUS CONTINUOUS
Status: DISCONTINUED | OUTPATIENT
Start: 2024-04-19 | End: 2024-04-19 | Stop reason: HOSPADM

## 2024-04-19 RX ORDER — SODIUM CHLORIDE 0.9 % (FLUSH) 0.9 %
3 SYRINGE (ML) INJECTION EVERY 12 HOURS SCHEDULED
Status: DISCONTINUED | OUTPATIENT
Start: 2024-04-19 | End: 2024-04-19 | Stop reason: HOSPADM

## 2024-04-19 RX ORDER — SODIUM CHLORIDE 0.9 % (FLUSH) 0.9 %
3-10 SYRINGE (ML) INJECTION AS NEEDED
Status: DISCONTINUED | OUTPATIENT
Start: 2024-04-19 | End: 2024-04-19 | Stop reason: HOSPADM

## 2024-04-19 RX ORDER — FENTANYL CITRATE 50 UG/ML
50 INJECTION, SOLUTION INTRAMUSCULAR; INTRAVENOUS
Status: DISCONTINUED | OUTPATIENT
Start: 2024-04-19 | End: 2024-04-19 | Stop reason: HOSPADM

## 2024-04-19 RX ORDER — SODIUM CHLORIDE 9 MG/ML
40 INJECTION, SOLUTION INTRAVENOUS AS NEEDED
Status: DISCONTINUED | OUTPATIENT
Start: 2024-04-19 | End: 2024-04-19 | Stop reason: HOSPADM

## 2024-04-19 RX ORDER — LIDOCAINE HYDROCHLORIDE 10 MG/ML
0.5 INJECTION, SOLUTION EPIDURAL; INFILTRATION; INTRACAUDAL; PERINEURAL ONCE AS NEEDED
Status: DISCONTINUED | OUTPATIENT
Start: 2024-04-19 | End: 2024-04-19 | Stop reason: HOSPADM

## 2024-04-19 RX ORDER — NALOXONE HCL 0.4 MG/ML
0.4 VIAL (ML) INJECTION AS NEEDED
Status: DISCONTINUED | OUTPATIENT
Start: 2024-04-19 | End: 2024-04-19 | Stop reason: HOSPADM

## 2024-04-19 RX ORDER — HYDROCODONE BITARTRATE AND ACETAMINOPHEN 5; 325 MG/1; MG/1
1 TABLET ORAL EVERY 6 HOURS PRN
Qty: 10 TABLET | Refills: 0 | Status: SHIPPED | OUTPATIENT
Start: 2024-04-19

## 2024-04-19 RX ORDER — FLUMAZENIL 0.1 MG/ML
0.2 INJECTION INTRAVENOUS AS NEEDED
Status: DISCONTINUED | OUTPATIENT
Start: 2024-04-19 | End: 2024-04-19 | Stop reason: HOSPADM

## 2024-04-19 RX ORDER — HYDROCODONE BITARTRATE AND ACETAMINOPHEN 5; 325 MG/1; MG/1
1 TABLET ORAL ONCE AS NEEDED
Status: DISCONTINUED | OUTPATIENT
Start: 2024-04-19 | End: 2024-04-19 | Stop reason: HOSPADM

## 2024-04-19 RX ADMIN — SODIUM CHLORIDE, POTASSIUM CHLORIDE, SODIUM LACTATE AND CALCIUM CHLORIDE 1000 ML: 600; 310; 30; 20 INJECTION, SOLUTION INTRAVENOUS at 08:16

## 2024-04-19 RX ADMIN — DEXMEDETOMIDINE HYDROCHLORIDE IN 0.9% SODIUM CHLORIDE 20 MCG: 4 INJECTION INTRAVENOUS at 10:23

## 2024-04-19 RX ADMIN — CEFAZOLIN 2 G: 1 INJECTION, POWDER, FOR SOLUTION INTRAMUSCULAR; INTRAVENOUS at 10:22

## 2024-04-19 RX ADMIN — PROPOFOL 100 MCG/KG/MIN: 10 INJECTION, EMULSION INTRAVENOUS at 10:23

## 2024-04-19 RX ADMIN — SODIUM CHLORIDE, POTASSIUM CHLORIDE, SODIUM LACTATE AND CALCIUM CHLORIDE: 600; 310; 30; 20 INJECTION, SOLUTION INTRAVENOUS at 10:22

## 2024-04-19 RX ADMIN — DROPERIDOL 1.25 MG: 2.5 INJECTION, SOLUTION INTRAMUSCULAR; INTRAVENOUS at 10:23

## 2024-04-19 RX ADMIN — ONDANSETRON 4 MG: 2 INJECTION INTRAMUSCULAR; INTRAVENOUS at 10:22

## 2024-04-19 RX ADMIN — FENTANYL CITRATE 100 MCG: 50 INJECTION, SOLUTION INTRAMUSCULAR; INTRAVENOUS at 10:23

## 2024-04-19 NOTE — OP NOTE
James Mar  4/19/2024     PREOPERATIVE DIAGNOSIS: Chronic venous hypertension with ulcer and inflammation involving both sides [I87.333]  Preop testing [Z01.818]     POSTOPERATIVE DIAGNOSIS: Post-Op Diagnosis Codes:     * Chronic venous hypertension with ulcer and inflammation involving both sides [I87.333]     * Preop testing [Z01.818]     PROCEDURE PERFORMED:   1.  Ultrasound-guided cannulation of the left lower extremity greater saphenous vein  2.  Radiofrequency ablation of the left lower extremity greater saphenous vein     SURGEON: Theodore Ch DO      ANESTHESIA: MAC    PREPARATION: Routine.    STAFF: Circulator: Marisa Adams RN  Scrub Person: Radha Mckeon Marla J  Vascular Ultrasound Technician: Matt Live    Estimated Blood Loss: minimal    SPECIMENS: None    COMPLICATIONS: None    INDICATIONS: James Mar is a 48 y.o. male who did have a previous VVI performed at [facility], unfortunately the results do not provide sufficient information in regard to the greater saphenous vein on his left lower extremity. He did have a superficial wound to the lateral aspect of his left lower extremity. He is currently wearing compression stockings. He does complain of pain at rest in addition to swelling, left greater than right. He also states that he has pain in his feet when walking. He is maintained on aspirin and Lipitor. He did have noninvasive testing performed today, which I did review in office. The indications, risks, and possible complications of the procedure were explained to the patient, who voiced understanding and wished to proceed with surgery.     PROCEDURE IN DETAIL:   The patient was taken to the operating room and placed on the operating table in a supine position. After MAC anesthesia was obtained, the left lower extremity was prepped and draped in a sterile manner.  Under ultrasound guidance and using a micropuncture technique the left lower extremity  greater saphenous vein was cannulated and a microwire was placed.  This was confirmed under ultrasound.  A small stab incision was made with 11 blade and a 7 Turkmen sheath was placed.  The patient was placed in Trendelenburg position and the saphenofemoral junction was identified under ultrasound.  The catheter was placed up to the junction and pulled back 3 cm and marked.  Next, tumescent fluid was instilled along the entire length of the vein under ultrasound guidance.  Once sufficient tumescent fluid was placed radiofrequency ablation had commenced.  There was a total of 11 RF cycles for a total treatment length of 65.5 cm for a total treatment time of 3 minutes and 40 seconds.  There was an average of 16 W at an average temperature of 120°C.  Upon completion of the ablation the catheter and sheath were removed.  Direct pressure was held for an additional 5-10 minutes to ensure hemostasis.  An Ace wrap was placed from the toes to the groin.  Sterile dressings were applied. The patient tolerated the procedure well. Sponge and needle counts were correct. The patient was then awakened and transferred to the outpatient center in stable condition.  Theodore Ch,   Date: 4/19/2024 Time: 11:03 CDT    CC:Radha Mortensen APRN

## 2024-04-19 NOTE — ANESTHESIA PREPROCEDURE EVALUATION
Anesthesia Evaluation     no history of anesthetic complications:   NPO Solid Status: > 8 hours  NPO Liquid Status: > 8 hours           Airway   Mallampati: I  No difficulty expected  Dental      Pulmonary    (+) a smoker Current,  Cardiovascular   Exercise tolerance: good (4-7 METS)    PT is on anticoagulation therapy    (+) hypertension, cardiac stents more than 12 months ago , DVT resolved, hyperlipidemia    ROS comment: 1 stent placed 1/2023  Pt reports he had balloon angioplasty 12/2023      Neuro/Psych  GI/Hepatic/Renal/Endo    (+) obesity, diabetes mellitus    Musculoskeletal     Abdominal    Substance History   (+) drug use (marijuana)     OB/GYN          Other                      Anesthesia Plan    ASA 3     MAC     intravenous induction     Anesthetic plan, risks, benefits, and alternatives have been provided, discussed and informed consent has been obtained with: patient.    CODE STATUS:

## 2024-04-19 NOTE — ANESTHESIA POSTPROCEDURE EVALUATION
Patient: James Mar    Procedure Summary       Date: 04/19/24 Room / Location:  [facility] OR  /  [facility] OR    Anesthesia Start: 1022 Anesthesia Stop:     Procedure: LEFT LOWER EXTREMITY RADIOFREQUENCY ABLATION (Left: Leg Lower) Diagnosis:       Chronic venous hypertension with ulcer and inflammation involving both sides      Preop testing      (Chronic venous hypertension with ulcer and inflammation involving both sides [I87.333])      (Preop testing [Z01.818])    Surgeons: Theodore Ch DO Provider: Jeferson Ortiz CRNA    Anesthesia Type: MAC ASA Status: 3            Anesthesia Type: MAC    Vitals  Vitals Value Taken Time   /78 04/19/24 1121   Temp 97.3 °F (36.3 °C) 04/19/24 1109   Pulse 66 04/19/24 1122   Resp 12 04/19/24 1114   SpO2 95 % 04/19/24 1122   Vitals shown include unfiled device data.        Post Anesthesia Care and Evaluation    Patient location during evaluation: PHASE II  Patient participation: complete - patient participated  Level of consciousness: awake and alert  Pain management: adequate    Airway patency: patent  Anesthetic complications: No anesthetic complications    Cardiovascular status: acceptable  Respiratory status: acceptable  Hydration status: acceptable    Comments: Blood pressure 114/67, pulse 68, temperature 97.3 °F (36.3 °C), temperature source Temporal, resp. rate 12, SpO2 96%.    Pt discharged from PACU based on ronan score >8

## 2024-04-25 ENCOUNTER — TELEPHONE (OUTPATIENT)
Dept: VASCULAR SURGERY | Facility: CLINIC | Age: 49
End: 2024-04-25
Payer: COMMERCIAL

## 2024-04-26 ENCOUNTER — HOSPITAL ENCOUNTER (OUTPATIENT)
Dept: ULTRASOUND IMAGING | Facility: HOSPITAL | Age: 49
Discharge: HOME | End: 2024-04-26
Payer: COMMERCIAL

## 2024-04-26 ENCOUNTER — OFFICE VISIT (OUTPATIENT)
Dept: VASCULAR SURGERY | Facility: CLINIC | Age: 49
End: 2024-04-26
Payer: COMMERCIAL

## 2024-04-26 VITALS
HEIGHT: 73 IN | WEIGHT: 262 LBS | BODY MASS INDEX: 34.72 KG/M2 | DIASTOLIC BLOOD PRESSURE: 80 MMHG | OXYGEN SATURATION: 97 % | SYSTOLIC BLOOD PRESSURE: 142 MMHG | HEART RATE: 82 BPM

## 2024-04-26 DIAGNOSIS — I10 ESSENTIAL HYPERTENSION: ICD-10-CM

## 2024-04-26 DIAGNOSIS — I87.333 CHRONIC VENOUS HYPERTENSION WITH ULCER AND INFLAMMATION INVOLVING BOTH SIDES: Primary | ICD-10-CM

## 2024-04-26 DIAGNOSIS — E78.49 OTHER HYPERLIPIDEMIA: ICD-10-CM

## 2024-04-26 DIAGNOSIS — E11.9 TYPE 2 DIABETES MELLITUS WITHOUT COMPLICATION, WITHOUT LONG-TERM CURRENT USE OF INSULIN: ICD-10-CM

## 2024-04-26 DIAGNOSIS — I87.333 CHRONIC VENOUS HYPERTENSION WITH ULCER AND INFLAMMATION INVOLVING BOTH SIDES: ICD-10-CM

## 2024-04-26 PROCEDURE — 93971 EXTREMITY STUDY: CPT

## 2024-04-26 NOTE — PROGRESS NOTES
"4/26/2024        Radha Mortensen APRN  [address] [place] [address] [address]  [facility] [address]      James Mar  1975    Chief Complaint   Patient presents with    Post-op     Left RFA 4/19/24. No issues stated.        Dear SHARITA Stringer:      HPI  I had the pleasure of seeing your patient James Mar in the office today.  As you recall, James Mar is a 48 y.o.  male who you are currently following for routine health maintenance.  He returns today after undergoing left radiofrequency ablation on 4/19/2024.  He is currently doing well, has no complaints at this time.  He does wear compression stockings on a daily basis.  He is maintained on aspirin and Lipitor.  He did have noninvasive testing performed today which I did review in office.        Review of Systems   Constitutional: Negative.  Negative for diaphoresis and fever.   HENT: Negative.     Eyes: Negative.    Respiratory: Negative.  Negative for shortness of breath and wheezing.    Cardiovascular:  Positive for leg swelling. Negative for chest pain.   Gastrointestinal: Negative.  Negative for abdominal pain.   Endocrine: Negative.    Genitourinary: Negative.    Musculoskeletal: Negative.    Skin:  Positive for color change.   Allergic/Immunologic: Negative.    Neurological: Negative.  Negative for dizziness and weakness.   Hematological: Negative.    Psychiatric/Behavioral: Negative.           /80   Pulse 82   Ht 185.4 cm (73\")   Wt 119 kg (262 lb)   SpO2 97%   BMI 34.57 kg/m²       Physical Exam  Vitals and nursing note reviewed.   Constitutional:       General: He is not in acute distress.     Appearance: Normal appearance. He is morbidly obese. He is not diaphoretic.   HENT:      Head: Normocephalic. No right periorbital erythema or left periorbital erythema.      Nose: Nose normal.   Eyes:      General: No scleral icterus.     Pupils: Pupils are equal.   Cardiovascular:      Rate and Rhythm: Normal rate and " regular rhythm.      Pulses: Normal pulses.      Heart sounds: Normal heart sounds. No murmur heard.     Comments: Left lower extremity incision healed.  Steri-Strip removed.  Pulmonary:      Effort: Pulmonary effort is normal. No respiratory distress.      Breath sounds: Normal breath sounds.   Abdominal:      General: Bowel sounds are normal. There is no distension.      Palpations: Abdomen is soft.      Tenderness: There is no abdominal tenderness. There is no guarding.   Musculoskeletal:         General: No swelling or tenderness. Normal range of motion.      Cervical back: Normal range of motion and neck supple.      Right lower leg: No edema.      Left lower leg: No edema.   Feet:      Right foot:      Skin integrity: Skin integrity normal.      Left foot:      Skin integrity: Skin integrity normal.   Skin:     General: Skin is warm and dry.      Findings: No erythema or rash.   Neurological:      General: No focal deficit present.      Mental Status: He is alert and oriented to person, place, and time. Mental status is at baseline.      Cranial Nerves: No cranial nerve deficit.      Gait: Gait normal.   Psychiatric:         Attention and Perception: Attention normal.         Mood and Affect: Mood normal.             DIAGNOSTIC DATA  Brit Beasley on 4/26/2024 10:32 AM CDT   LLE s/p RFA: no evidence of DVT. GSV closed 3.8cm from CFV through dist calf.         Patient Active Problem List   Diagnosis    Essential hypertension    Type 2 diabetes mellitus without complication, without long-term current use of insulin    Other hyperlipidemia    Chronic venous hypertension with ulcer and inflammation involving both sides    Preop testing         ICD-10-CM ICD-9-CM   1. Chronic venous hypertension with ulcer and inflammation involving both sides  I87.333 459.33   2. Essential hypertension  I10 401.9   3. Other hyperlipidemia  E78.49 272.4   4. Type 2 diabetes mellitus without complication, without long-term  current use of insulin  E11.9 250.00           Plan: After thoroughly evaluating James Mar, I believe the best course of action is to remain conservative from vascular surgery standpoint.  We will see him back in 6 months for continued surveillance.  He is currently doing well, he did have noninvasive testing performed which shows no DVT present.  GSV is closed from CFV to distal calf.  He does have slight reflux to the right lower extremity, but symptoms were not as prevalent as they are to the left.  We will reassess in 6 months.  I would like him to continue wearing compression stockings on a daily basis.  Elevate his legs when not on them.  He is maintained on aspirin 81 mg daily and Lipitor 80 mg nightly.  I did discuss vascular risk factors as they pertain to the progression of vascular disease including controlling hypertension, hyperlipidemia, and diabetes, in addition to smoking cessation.  His blood pressure is slightly elevated today in office at 142/80, if this continues he does need to follow-up with his PCP for further recommendations.  His most recent hemoglobin A1c on 2/22/2024 was 6.8%.  His lipid panel was normal except for total cholesterol was 137 and HDL was 51 on 2/22/2024.  Unfortunately, he is a daily smoker and has no desire to quit at this time.  He can continue taking his current medications as previously discussed.   This was all discussed in full with complete understanding.    Thank you for allowing me to participate in the care of your patient.  Please do not hesitate with any questions or concerns.  I will keep you aware of any further encounters with James Mar.        Sincerely yours,         SHARITA Cueto

## 2024-05-01 DIAGNOSIS — E11.9 TYPE 2 DIABETES MELLITUS WITHOUT COMPLICATION, WITHOUT LONG-TERM CURRENT USE OF INSULIN (HCC): ICD-10-CM

## 2024-05-02 RX ORDER — GLUCOSAM/CHON-MSM1/C/MANG/BOSW 500-416.6
TABLET ORAL
Qty: 100 EACH | Refills: 5 | Status: SHIPPED | OUTPATIENT
Start: 2024-05-02

## 2024-05-02 NOTE — TELEPHONE ENCOUNTER
Last OV 2/23/2024  Next OV 5/23/2024      Requested Prescriptions     Pending Prescriptions Disp Refills    TRUEplus Lancets 28G MISC [Pharmacy Med Name: TRUEPLUS LANCETS 28G  MISC] 100 each 5     Sig: USE TWO TIMES A DAY AND AS NEEDED FOR BLOOD GLUCOSE TESTING    metFORMIN (GLUCOPHAGE) 500 MG tablet [Pharmacy Med Name: METFORMIN HCL 500MG TABS] 180 tablet 3     Sig: TAKE ONE TABLET BY MOUTH TWICE A DAY WITH MEALS

## 2024-05-28 DIAGNOSIS — E11.9 TYPE 2 DIABETES MELLITUS WITHOUT COMPLICATION, WITHOUT LONG-TERM CURRENT USE OF INSULIN (HCC): ICD-10-CM

## 2024-05-28 RX ORDER — SEMAGLUTIDE 1.34 MG/ML
INJECTION, SOLUTION SUBCUTANEOUS
Qty: 9 ML | Refills: 0 | Status: SHIPPED | OUTPATIENT
Start: 2024-05-28

## 2024-05-28 RX ORDER — LOSARTAN POTASSIUM 50 MG/1
50 TABLET ORAL DAILY
Qty: 30 TABLET | Refills: 3 | Status: SHIPPED | OUTPATIENT
Start: 2024-05-28

## 2024-05-28 RX ORDER — GLIPIZIDE 10 MG/1
TABLET ORAL
Qty: 60 TABLET | Refills: 3 | Status: SHIPPED | OUTPATIENT
Start: 2024-05-28

## 2024-05-28 RX ORDER — ATORVASTATIN CALCIUM 80 MG/1
80 TABLET, FILM COATED ORAL NIGHTLY
Qty: 30 TABLET | Refills: 2 | Status: SHIPPED | OUTPATIENT
Start: 2024-05-28

## 2024-05-28 RX ORDER — METOPROLOL SUCCINATE 50 MG/1
50 TABLET, EXTENDED RELEASE ORAL DAILY
Qty: 30 TABLET | Refills: 3 | Status: SHIPPED | OUTPATIENT
Start: 2024-05-28

## 2024-05-28 RX ORDER — ASPIRIN 81 MG/1
TABLET, COATED ORAL
Qty: 30 TABLET | Refills: 2 | Status: SHIPPED | OUTPATIENT
Start: 2024-05-28

## 2024-05-28 NOTE — TELEPHONE ENCOUNTER
Last OV 2/23/2024  Next OV 5/28/2024 - Patient aware of the missed apt and he rescheduled - knows he also has labs      Requested Prescriptions     Pending Prescriptions Disp Refills    OZEMPIC, 1 MG/DOSE, 4 MG/3ML SOPN sc injection [Pharmacy Med Name: OZEMPIC (1 MG/DOSE) 4MG/3ML SOPN] 9 mL 0     Sig: INJECT 1MG UNDER THE SKIN ONCE A WEEK    atorvastatin (LIPITOR) 80 MG tablet [Pharmacy Med Name: ATORVASTATIN CALCIUM 80MG TABS] 30 tablet 2     Sig: TAKE ONE TABLET BY MOUTH EVERY EVENING    glipiZIDE (GLUCOTROL) 10 MG tablet [Pharmacy Med Name: GLIPIZIDE 10MG TABS] 60 tablet 3     Sig: TAKE ONE TABLET BY MOUTH TWICE A DAY    metoprolol succinate (TOPROL XL) 50 MG extended release tablet [Pharmacy Med Name: METOPROLOL SUCCINATE ER 50MG TB24] 30 tablet 3     Sig: TAKE ONE TABLET BY MOUTH EVERY DAY    losartan (COZAAR) 50 MG tablet [Pharmacy Med Name: LOSARTAN POTASSIUM 50MG TABS] 30 tablet 3     Sig: TAKE ONE TABLET BY MOUTH EVERY DAY

## 2024-06-11 ENCOUNTER — HOSPITAL ENCOUNTER (OUTPATIENT)
Dept: GENERAL RADIOLOGY | Age: 49
Discharge: HOME | End: 2024-06-11
Payer: MEDICAID

## 2024-06-11 ENCOUNTER — OFFICE VISIT (OUTPATIENT)
Dept: INTERNAL MEDICINE | Age: 49
End: 2024-06-11
Payer: MEDICAID

## 2024-06-11 VITALS
BODY MASS INDEX: 33.57 KG/M2 | HEIGHT: 75 IN | OXYGEN SATURATION: 98 % | WEIGHT: 270 LBS | DIASTOLIC BLOOD PRESSURE: 80 MMHG | SYSTOLIC BLOOD PRESSURE: 128 MMHG | HEART RATE: 78 BPM

## 2024-06-11 DIAGNOSIS — E11.9 TYPE 2 DIABETES MELLITUS WITHOUT COMPLICATION, WITHOUT LONG-TERM CURRENT USE OF INSULIN (HCC): Primary | ICD-10-CM

## 2024-06-11 DIAGNOSIS — E11.9 TYPE 2 DIABETES MELLITUS WITHOUT COMPLICATION, UNSPECIFIED WHETHER LONG TERM INSULIN USE (HCC): ICD-10-CM

## 2024-06-11 DIAGNOSIS — E11.9 TYPE 2 DIABETES MELLITUS WITHOUT COMPLICATION, WITHOUT LONG-TERM CURRENT USE OF INSULIN (HCC): ICD-10-CM

## 2024-06-11 DIAGNOSIS — Z00.00 ENCOUNTER FOR WELL ADULT EXAM WITHOUT ABNORMAL FINDINGS: ICD-10-CM

## 2024-06-11 DIAGNOSIS — M54.6 ACUTE MIDLINE THORACIC BACK PAIN: ICD-10-CM

## 2024-06-11 DIAGNOSIS — L02.416 ABSCESS OF LEFT LEG: ICD-10-CM

## 2024-06-11 DIAGNOSIS — Z12.11 COLON CANCER SCREENING: ICD-10-CM

## 2024-06-11 DIAGNOSIS — I25.10 CORONARY ARTERY DISEASE INVOLVING NATIVE CORONARY ARTERY OF NATIVE HEART WITHOUT ANGINA PECTORIS: ICD-10-CM

## 2024-06-11 DIAGNOSIS — E78.5 DYSLIPIDEMIA: ICD-10-CM

## 2024-06-11 DIAGNOSIS — I10 ESSENTIAL HYPERTENSION: ICD-10-CM

## 2024-06-11 LAB
ALBUMIN SERPL-MCNC: 4.3 G/DL (ref 3.5–5.2)
ALP SERPL-CCNC: 62 U/L (ref 40–130)
ALT SERPL-CCNC: 24 U/L (ref 5–41)
ANION GAP SERPL CALCULATED.3IONS-SCNC: 14 MMOL/L (ref 7–19)
AST SERPL-CCNC: 21 U/L (ref 5–40)
BASOPHILS # BLD: 0.1 K/UL (ref 0–0.2)
BASOPHILS NFR BLD: 1.2 % (ref 0–1)
BILIRUB SERPL-MCNC: 0.4 MG/DL (ref 0.2–1.2)
BUN SERPL-MCNC: 10 MG/DL (ref 6–20)
CALCIUM SERPL-MCNC: 9 MG/DL (ref 8.6–10)
CHLORIDE SERPL-SCNC: 102 MMOL/L (ref 98–111)
CO2 SERPL-SCNC: 26 MMOL/L (ref 22–29)
CREAT SERPL-MCNC: 0.8 MG/DL (ref 0.5–1.2)
EOSINOPHIL # BLD: 0.1 K/UL (ref 0–0.6)
EOSINOPHIL NFR BLD: 2 % (ref 0–5)
ERYTHROCYTE [DISTWIDTH] IN BLOOD BY AUTOMATED COUNT: 13.2 % (ref 11.5–14.5)
GLUCOSE SERPL-MCNC: 171 MG/DL (ref 74–109)
HBA1C MFR BLD: 6.9 % (ref 4–6)
HCT VFR BLD AUTO: 44.1 % (ref 42–52)
HGB BLD-MCNC: 14.7 G/DL (ref 14–18)
IMM GRANULOCYTES # BLD: 0 K/UL
LYMPHOCYTES # BLD: 1.8 K/UL (ref 1.1–4.5)
LYMPHOCYTES NFR BLD: 26.7 % (ref 20–40)
MCH RBC QN AUTO: 29.9 PG (ref 27–31)
MCHC RBC AUTO-ENTMCNC: 33.3 G/DL (ref 33–37)
MCV RBC AUTO: 89.6 FL (ref 80–94)
MONOCYTES # BLD: 0.4 K/UL (ref 0–0.9)
MONOCYTES NFR BLD: 6.2 % (ref 0–10)
NEUTROPHILS # BLD: 4.2 K/UL (ref 1.5–7.5)
NEUTS SEG NFR BLD: 63.6 % (ref 50–65)
PLATELET # BLD AUTO: 252 K/UL (ref 130–400)
PMV BLD AUTO: 10.3 FL (ref 9.4–12.4)
POTASSIUM SERPL-SCNC: 4 MMOL/L (ref 3.5–5)
PROT SERPL-MCNC: 6.9 G/DL (ref 6.6–8.7)
RBC # BLD AUTO: 4.92 M/UL (ref 4.7–6.1)
SODIUM SERPL-SCNC: 142 MMOL/L (ref 136–145)
WBC # BLD AUTO: 6.6 K/UL (ref 4.8–10.8)

## 2024-06-11 PROCEDURE — 3074F SYST BP LT 130 MM HG: CPT | Performed by: NURSE PRACTITIONER

## 2024-06-11 PROCEDURE — 99396 PREV VISIT EST AGE 40-64: CPT | Performed by: NURSE PRACTITIONER

## 2024-06-11 PROCEDURE — 72072 X-RAY EXAM THORAC SPINE 3VWS: CPT

## 2024-06-11 PROCEDURE — 3079F DIAST BP 80-89 MM HG: CPT | Performed by: NURSE PRACTITIONER

## 2024-06-11 RX ORDER — LIDOCAINE 50 MG/G
1 PATCH TOPICAL DAILY
Qty: 30 PATCH | Refills: 0 | Status: SHIPPED | OUTPATIENT
Start: 2024-06-11

## 2024-06-11 RX ORDER — SULFAMETHOXAZOLE AND TRIMETHOPRIM 800; 160 MG/1; MG/1
1 TABLET ORAL 2 TIMES DAILY
Qty: 20 TABLET | Refills: 0 | Status: SHIPPED | OUTPATIENT
Start: 2024-06-11 | End: 2024-06-21

## 2024-06-11 RX ORDER — TIZANIDINE 4 MG/1
4 TABLET ORAL EVERY 8 HOURS PRN
Qty: 30 TABLET | Refills: 0 | Status: SHIPPED | OUTPATIENT
Start: 2024-06-11

## 2024-06-11 ASSESSMENT — ENCOUNTER SYMPTOMS: BACK PAIN: 1

## 2024-06-11 NOTE — PROGRESS NOTES
[facility]  [facility] INTERNAL MEDICINE  [address] [place] [address] DRIVE  [address]  [place] [address]  Dept: [phone]  Dept Fax: [phone]  Loc: [phone]    Duane Saunders is a 49 y.o. male who presents today for his medical conditions/complaintsas noted below.  Duane Saunders is c/o of Annual Exam, Follow-up, and Back Pain        HPI:   Duane presents today for his yearly exam and follow-up on his diabetes.  He reports his glucose has been great.  He continues to take his Ozempic, metformin, and glipizide.  About 2 to 3 weeks ago he started having thoracic back pain.  He has had back pain in the past.  He reports when he was a child he was diagnosed with scoliosis but this did not require treatment.  He reports now this is a different pain.  He reports no injury.  He did start going back to work but was only able to go back for a few days.  He has taken Tylenol and it is not helping.  The pain does not radiate to his arms or legs.  He does report sometimes his feet feel like that he has pins-and-needles in them.  He also reports a raised red area on his left leg.  Not itchy or painful.  At first he thought it was poison ivy but he does not have a reaction normally to poison ivy.  He is not sure if he is been bitten by anything.  He continues to follow with cardiology.  Past Medical History:   Diagnosis Date    Cellulitis and abscess of left leg 11/26/2023    Class 1 obesity     Diabetes mellitus (HCC)     DVT (deep venous thrombosis) ([facility]) 10/27/2014    Left popliteal vein     Hyperlipidemia     Hypertension     Tobacco abuse      Past Surgical History:   Procedure Laterality Date    APPENDECTOMY      at around age 4-5 years    CAROTID STENT  02/2023    CHOLECYSTECTOMY, LAPAROSCOPIC  2005       Family History   Problem Relation Age of Onset    High Blood Pressure Mother     Heart Attack Father 62    Elevated Lipids Sister     Other Sister         stomach issues    No Known Problems Brother

## 2024-06-19 DIAGNOSIS — M54.6 ACUTE MIDLINE THORACIC BACK PAIN: Primary | ICD-10-CM

## 2024-07-01 RX ORDER — PRASUGREL 10 MG/1
TABLET, FILM COATED ORAL
Qty: 30 TABLET | Refills: 2 | OUTPATIENT
Start: 2024-07-01

## 2024-07-02 RX ORDER — ISOPROPYL ALCOHOL 0.75 G/1
SWAB TOPICAL
Refills: 5 | OUTPATIENT
Start: 2024-07-02

## 2024-07-02 RX ORDER — PRASUGREL 10 MG/1
TABLET, FILM COATED ORAL
Qty: 30 TABLET | Refills: 2 | OUTPATIENT
Start: 2024-07-02

## 2024-07-02 RX ORDER — ATORVASTATIN CALCIUM 80 MG/1
80 TABLET, FILM COATED ORAL NIGHTLY
Qty: 30 TABLET | Refills: 2 | OUTPATIENT
Start: 2024-07-02

## 2024-07-02 RX ORDER — ASPIRIN 81 MG/1
TABLET, COATED ORAL
Qty: 30 TABLET | Refills: 2 | Status: SHIPPED | OUTPATIENT
Start: 2024-07-02

## 2024-08-12 ENCOUNTER — TELEPHONE (OUTPATIENT)
Dept: CARDIOLOGY CLINIC | Age: 49
End: 2024-08-12

## 2024-08-12 NOTE — TELEPHONE ENCOUNTER
Date: 8/22/24     Cardiologist: James     Procedure: Colonoscopy     Surgeon: Aneesh     Last Office Visit: 4/12/24  Reason for office visit and medical concerns addressed at this office visit: dm, dvt, pvd, cad, htn, hyperlipidemia     Testing Performed and Date of Service:  12/22/23 Cath  Diagnostic procedure:DCA, Coronary Angiogram, Left Heart Cath, Left   Ventricular Pressure Measurement, Other Diagnostic Procedure, IVUS      PCI procedure:LAD, LAD, PTCA, ARVIND      Conclusions      Successful intervention of proximal LAD stent thrombosis using   thrombectomy, balloon angioplasty.    Current Medications: chantix, ozempic, effient, metoprolol, metformin, losartan, glipizide, lasix, atorvastatin, aspirin     Is the patient currently taking an anticoagulant? If so, what is the diagnosis the patient has been given to warrant the need for the anticoagulant? Effient for ARVIND 12/22/23     Additional Notes: requesting to hold plavix prior to procedure

## 2024-10-03 RX ORDER — METOPROLOL SUCCINATE 50 MG/1
50 TABLET, EXTENDED RELEASE ORAL DAILY
Qty: 30 TABLET | Refills: 3 | Status: SHIPPED | OUTPATIENT
Start: 2024-10-03

## 2024-10-03 RX ORDER — LOSARTAN POTASSIUM 50 MG/1
50 TABLET ORAL DAILY
Qty: 30 TABLET | Refills: 3 | Status: SHIPPED | OUTPATIENT
Start: 2024-10-03

## 2024-10-03 RX ORDER — GLIPIZIDE 10 MG/1
TABLET ORAL
Qty: 60 TABLET | Refills: 3 | Status: SHIPPED | OUTPATIENT
Start: 2024-10-03

## 2024-10-03 RX ORDER — ATORVASTATIN CALCIUM 80 MG/1
80 TABLET, FILM COATED ORAL NIGHTLY
Qty: 30 TABLET | Refills: 2 | Status: SHIPPED | OUTPATIENT
Start: 2024-10-03

## 2024-10-03 RX ORDER — PRASUGREL 10 MG/1
TABLET, FILM COATED ORAL
Qty: 30 TABLET | Refills: 2 | Status: SHIPPED | OUTPATIENT
Start: 2024-10-03

## 2024-10-03 RX ORDER — ASPIRIN 81 MG/1
TABLET, COATED ORAL
Qty: 30 TABLET | Refills: 2 | Status: SHIPPED | OUTPATIENT
Start: 2024-10-03

## 2024-10-03 NOTE — TELEPHONE ENCOUNTER
Duane called requesting a refill of the below medication which has been pended for you:     Requested Prescriptions     Pending Prescriptions Disp Refills    losartan (COZAAR) 50 MG tablet [Pharmacy Med Name: LOSARTAN POTASSIUM 50MG TABS] 30 tablet 3     Sig: TAKE ONE TABLET BY MOUTH EVERY DAY    atorvastatin (LIPITOR) 80 MG tablet [Pharmacy Med Name: ATORVASTATIN CALCIUM 80MG TABS] 30 tablet 2     Sig: TAKE ONE TABLET BY MOUTH EVERY EVENING    metoprolol succinate (TOPROL XL) 50 MG extended release tablet [Pharmacy Med Name: METOPROLOL SUCCINATE ER 50MG TB24] 30 tablet 3     Sig: TAKE ONE TABLET BY MOUTH EVERY DAY    prasugrel (EFFIENT) 10 MG TABS [Pharmacy Med Name: PRASUGREL HCL 10MG TABS] 30 tablet 2     Sig: TAKE ONE TABLET BY MOUTH EVERY DAY    glipiZIDE (GLUCOTROL) 10 MG tablet [Pharmacy Med Name: GLIPIZIDE 10MG TABS] 60 tablet 3     Sig: TAKE ONE TABLET BY MOUTH TWICE A DAY       Last Appointment Date: 6/11/2024  Next Appointment Date: 10/2/2024    No Known Allergies

## 2024-10-24 ENCOUNTER — TELEPHONE (OUTPATIENT)
Dept: CARDIOLOGY CLINIC | Age: 49
End: 2024-10-24

## 2024-10-25 ENCOUNTER — TELEPHONE (OUTPATIENT)
Dept: VASCULAR SURGERY | Facility: CLINIC | Age: 49
End: 2024-10-25
Payer: COMMERCIAL

## 2024-10-28 ENCOUNTER — OFFICE VISIT (OUTPATIENT)
Dept: CARDIOLOGY CLINIC | Age: 49
End: 2024-10-28
Payer: MEDICAID

## 2024-10-28 VITALS
HEART RATE: 87 BPM | BODY MASS INDEX: 33.57 KG/M2 | OXYGEN SATURATION: 99 % | DIASTOLIC BLOOD PRESSURE: 90 MMHG | RESPIRATION RATE: 18 BRPM | WEIGHT: 270 LBS | HEIGHT: 75 IN | SYSTOLIC BLOOD PRESSURE: 148 MMHG

## 2024-10-28 DIAGNOSIS — I10 ESSENTIAL HYPERTENSION: ICD-10-CM

## 2024-10-28 DIAGNOSIS — I25.10 CORONARY ARTERY DISEASE INVOLVING NATIVE CORONARY ARTERY OF NATIVE HEART WITHOUT ANGINA PECTORIS: Primary | ICD-10-CM

## 2024-10-28 DIAGNOSIS — E78.5 DYSLIPIDEMIA: ICD-10-CM

## 2024-10-28 PROCEDURE — 3077F SYST BP >= 140 MM HG: CPT | Performed by: NURSE PRACTITIONER

## 2024-10-28 PROCEDURE — 4004F PT TOBACCO SCREEN RCVD TLK: CPT | Performed by: NURSE PRACTITIONER

## 2024-10-28 PROCEDURE — G8417 CALC BMI ABV UP PARAM F/U: HCPCS | Performed by: NURSE PRACTITIONER

## 2024-10-28 PROCEDURE — 3017F COLORECTAL CA SCREEN DOC REV: CPT | Performed by: NURSE PRACTITIONER

## 2024-10-28 PROCEDURE — G8484 FLU IMMUNIZE NO ADMIN: HCPCS | Performed by: NURSE PRACTITIONER

## 2024-10-28 PROCEDURE — 3080F DIAST BP >= 90 MM HG: CPT | Performed by: NURSE PRACTITIONER

## 2024-10-28 PROCEDURE — 99214 OFFICE O/P EST MOD 30 MIN: CPT | Performed by: NURSE PRACTITIONER

## 2024-10-28 PROCEDURE — G8427 DOCREV CUR MEDS BY ELIG CLIN: HCPCS | Performed by: NURSE PRACTITIONER

## 2024-10-28 RX ORDER — WARFARIN SODIUM 10 MG/1
TABLET ORAL
COMMUNITY

## 2024-10-28 RX ORDER — ENOXAPARIN SODIUM 150 MG/ML
INJECTION SUBCUTANEOUS
COMMUNITY

## 2024-10-28 ASSESSMENT — ENCOUNTER SYMPTOMS
CHEST TIGHTNESS: 0
SHORTNESS OF BREATH: 0
WHEEZING: 0
COUGH: 0
SORE THROAT: 0

## 2024-10-28 NOTE — PROGRESS NOTES
dictation was generated by voice recognition computer software. Although all attempts are made to edit dictation for accuracy, there may be errors in the transcription that are not intended.

## 2024-11-13 DIAGNOSIS — E11.9 TYPE 2 DIABETES MELLITUS WITHOUT COMPLICATION, WITHOUT LONG-TERM CURRENT USE OF INSULIN (HCC): ICD-10-CM

## 2024-11-14 RX ORDER — SEMAGLUTIDE 1.34 MG/ML
INJECTION, SOLUTION SUBCUTANEOUS
Qty: 9 ML | Refills: 0 | OUTPATIENT
Start: 2024-11-14

## 2025-02-23 ENCOUNTER — APPOINTMENT (OUTPATIENT)
Dept: CT IMAGING | Age: 50
End: 2025-02-23
Payer: MEDICAID

## 2025-02-23 ENCOUNTER — HOSPITAL ENCOUNTER (EMERGENCY)
Age: 50
Discharge: HOME | End: 2025-02-23
Attending: STUDENT IN AN ORGANIZED HEALTH CARE EDUCATION/TRAINING PROGRAM
Payer: MEDICAID

## 2025-02-23 VITALS
WEIGHT: 260 LBS | BODY MASS INDEX: 32.5 KG/M2 | TEMPERATURE: 97.5 F | SYSTOLIC BLOOD PRESSURE: 110 MMHG | DIASTOLIC BLOOD PRESSURE: 78 MMHG | HEART RATE: 91 BPM | OXYGEN SATURATION: 98 % | RESPIRATION RATE: 17 BRPM

## 2025-02-23 DIAGNOSIS — K52.9 GASTROENTERITIS: Primary | ICD-10-CM

## 2025-02-23 LAB
ALBUMIN SERPL-MCNC: 4.2 G/DL (ref 3.5–5.2)
ALP SERPL-CCNC: 59 U/L (ref 40–129)
ALT SERPL-CCNC: 22 U/L (ref 5–41)
ANION GAP SERPL CALCULATED.3IONS-SCNC: 14 MMOL/L (ref 8–16)
AST SERPL-CCNC: 16 U/L (ref 5–40)
BASOPHILS # BLD: 0.1 K/UL (ref 0–0.2)
BASOPHILS NFR BLD: 0.6 % (ref 0–1)
BILIRUB SERPL-MCNC: 0.7 MG/DL (ref 0.2–1.2)
BUN SERPL-MCNC: 10 MG/DL (ref 6–20)
CALCIUM SERPL-MCNC: 8.5 MG/DL (ref 8.6–10)
CHLORIDE SERPL-SCNC: 99 MMOL/L (ref 98–107)
CO2 SERPL-SCNC: 22 MMOL/L (ref 22–29)
CREAT SERPL-MCNC: 0.9 MG/DL (ref 0.7–1.2)
EOSINOPHIL # BLD: 0 K/UL (ref 0–0.6)
EOSINOPHIL NFR BLD: 0.5 % (ref 0–5)
ERYTHROCYTE [DISTWIDTH] IN BLOOD BY AUTOMATED COUNT: 13.3 % (ref 11.5–14.5)
GLUCOSE SERPL-MCNC: 201 MG/DL (ref 70–99)
HCT VFR BLD AUTO: 48.5 % (ref 42–52)
HGB BLD-MCNC: 16 G/DL (ref 14–18)
IMM GRANULOCYTES # BLD: 0 K/UL
INR PPP: 0.95 (ref 0.88–1.18)
LACTATE BLDV-SCNC: 1.4 MMOL/L (ref 0.5–1.9)
LIPASE SERPL-CCNC: 33 U/L (ref 13–60)
LYMPHOCYTES # BLD: 0.9 K/UL (ref 1.1–4.5)
LYMPHOCYTES NFR BLD: 11.7 % (ref 20–40)
MCH RBC QN AUTO: 29.9 PG (ref 27–31)
MCHC RBC AUTO-ENTMCNC: 33 G/DL (ref 33–37)
MCV RBC AUTO: 90.7 FL (ref 80–94)
MONOCYTES # BLD: 0.4 K/UL (ref 0–0.9)
MONOCYTES NFR BLD: 4.5 % (ref 0–10)
NEUTROPHILS # BLD: 6.4 K/UL (ref 1.5–7.5)
NEUTS SEG NFR BLD: 82.3 % (ref 50–65)
PLATELET # BLD AUTO: 263 K/UL (ref 130–400)
PMV BLD AUTO: 10.4 FL (ref 9.4–12.4)
POTASSIUM SERPL-SCNC: 3.7 MMOL/L (ref 3.5–5.1)
PROT SERPL-MCNC: 7.1 G/DL (ref 6.4–8.3)
PROTHROMBIN TIME: 12.4 SEC (ref 12–14.6)
RBC # BLD AUTO: 5.35 M/UL (ref 4.7–6.1)
SODIUM SERPL-SCNC: 135 MMOL/L (ref 136–145)
WBC # BLD AUTO: 7.8 K/UL (ref 4.8–10.8)

## 2025-02-23 PROCEDURE — 80053 COMPREHEN METABOLIC PANEL: CPT

## 2025-02-23 PROCEDURE — 96374 THER/PROPH/DIAG INJ IV PUSH: CPT

## 2025-02-23 PROCEDURE — 83690 ASSAY OF LIPASE: CPT

## 2025-02-23 PROCEDURE — 74174 CTA ABD&PLVS W/CONTRAST: CPT

## 2025-02-23 PROCEDURE — 6360000002 HC RX W HCPCS: Performed by: STUDENT IN AN ORGANIZED HEALTH CARE EDUCATION/TRAINING PROGRAM

## 2025-02-23 PROCEDURE — 99285 EMERGENCY DEPT VISIT HI MDM: CPT

## 2025-02-23 PROCEDURE — 36415 COLL VENOUS BLD VENIPUNCTURE: CPT

## 2025-02-23 PROCEDURE — 96361 HYDRATE IV INFUSION ADD-ON: CPT

## 2025-02-23 PROCEDURE — 85025 COMPLETE CBC W/AUTO DIFF WBC: CPT

## 2025-02-23 PROCEDURE — 2580000003 HC RX 258: Performed by: STUDENT IN AN ORGANIZED HEALTH CARE EDUCATION/TRAINING PROGRAM

## 2025-02-23 PROCEDURE — 6360000004 HC RX CONTRAST MEDICATION: Performed by: STUDENT IN AN ORGANIZED HEALTH CARE EDUCATION/TRAINING PROGRAM

## 2025-02-23 PROCEDURE — 85610 PROTHROMBIN TIME: CPT

## 2025-02-23 PROCEDURE — 83605 ASSAY OF LACTIC ACID: CPT

## 2025-02-23 RX ORDER — 0.9 % SODIUM CHLORIDE 0.9 %
1000 INTRAVENOUS SOLUTION INTRAVENOUS ONCE
Status: COMPLETED | OUTPATIENT
Start: 2025-02-23 | End: 2025-02-23

## 2025-02-23 RX ORDER — MORPHINE SULFATE 4 MG/ML
4 INJECTION, SOLUTION INTRAMUSCULAR; INTRAVENOUS ONCE
Status: COMPLETED | OUTPATIENT
Start: 2025-02-23 | End: 2025-02-23

## 2025-02-23 RX ORDER — IOPAMIDOL 755 MG/ML
70 INJECTION, SOLUTION INTRAVASCULAR
Status: COMPLETED | OUTPATIENT
Start: 2025-02-23 | End: 2025-02-23

## 2025-02-23 RX ADMIN — SODIUM CHLORIDE 1000 ML: 9 INJECTION, SOLUTION INTRAVENOUS at 16:00

## 2025-02-23 RX ADMIN — MORPHINE SULFATE 4 MG: 4 INJECTION, SOLUTION INTRAMUSCULAR; INTRAVENOUS at 16:12

## 2025-02-23 RX ADMIN — IOPAMIDOL 70 ML: 755 INJECTION, SOLUTION INTRAVENOUS at 16:06

## 2025-02-23 ASSESSMENT — PAIN SCALES - GENERAL: PAINLEVEL_OUTOF10: 9

## 2025-02-23 NOTE — ED PROVIDER NOTES
[facility] EMERGENCY DEPARTMENT  eMERGENCY dEPARTMENT eNCOUnter      Pt Name: Duane Saunders  MRN: [id number]  Birthdate 1975  Date of evaluation: 2/23/2025  Provider: Ellie Brand MD    CHIEF COMPLAINT       Chief Complaint   Patient presents with    Abdominal Pain     Generalized abd pain and diarrhea onset last night after eating out    Diarrhea         HISTORY OF PRESENT ILLNESS   (Location/Symptom, Timing/Onset,Context/Setting, Quality, Duration, Modifying Factors, Severity)  Note limiting factors.     HPI    Duane Saunders is a 49 y.o. male with PMH of tobacco use, CAD, history of DVT who presents to the emergency department with CC of sudden onset diffuse severe abdominal pain with associated multiple episodes of watery diarrhea.  Symptoms started on the way home from [employer] last night after patient ate out.  No associated nausea or vomiting.  He has never had pain like this before.  No blood in the stool.  Prior cholecystectomy/appendectomy.  Previously on Lovenox and warfarin for history of DVT, now on aspirin and Effient.  No history of SBO.        NursingNotes were reviewed.    REVIEW OF SYSTEMS    (2-9 systems for level 4, 10 or more for level 5)     Review of Systems   Constitutional:  Negative for appetite change, chills, fatigue and fever.   HENT:  Negative for congestion and sore throat.    Eyes:  Negative for pain and redness.   Respiratory:  Negative for cough, chest tightness and shortness of breath.    Cardiovascular:  Negative for chest pain and leg swelling.   Gastrointestinal:  Positive for abdominal pain and diarrhea. Negative for blood in stool, nausea and vomiting.   Genitourinary:  Negative for decreased urine volume, dysuria and frequency.   Musculoskeletal:  Negative for neck pain and neck stiffness.   Skin:  Negative for rash and wound.   Neurological:  Negative for dizziness, seizures, light-headedness and headaches.   Psychiatric/Behavioral:  Negative for behavioral problems

## 2025-02-24 NOTE — ED NOTES
Pt again reminded that urine sample is needed, pt denies being able to provide sample at this time. Pt encouraged to call out if able to give urine sample.

## 2025-02-24 NOTE — DISCHARGE INSTR - COC
Continuity of Care Form    Patient Name: Duane Saunders   :  1975  MRN:  [id number]    Admit date:  2025  Discharge date:  ***    Code Status Order: Prior   Advance Directives:   Advance Care Flowsheet Documentation             Admitting Physician:  No admitting provider for patient encounter.  PCP: Salma Collins APRN    Discharging Nurse: ***  Discharging Hospital Unit/Room#:   Discharging Unit Phone Number: ***    Emergency Contact:   Extended Emergency Contact Information  Primary Emergency Contact: Yanet Saunders   [facility]  Home Phone: [phone]  Relation: Spouse  Secondary Emergency Contact: Saunders Duane EL   [facility]  Home Phone: [phone]  Relation: Parent    Past Surgical History:  Past Surgical History:   Procedure Laterality Date    APPENDECTOMY      at around age 4-5 years    CAROTID STENT  2023    CHOLECYSTECTOMY, LAPAROSCOPIC      CORONARY ANGIOPLASTY WITH STENT PLACEMENT      1       Immunization History:     There is no immunization history on file for this patient.    Active Problems:  Patient Active Problem List   Diagnosis Code    DVT (deep venous thrombosis) ([facility]) I82.409    Chest pain R07.9    NSTEMI (non-ST elevation myocardial infarction) ([facility]) I21.4    Diabetes ([facility]) E11.9    STEMI (ST elevation myocardial infarction) ([facility]) I21.3    Thrombosis I82.90    PVD (peripheral vascular disease) I73.9    Swelling R60.9    Venous insufficiency I87.2    Skin ulcer of left lower leg with fat layer exposed ([facility]) L97.922       Isolation/Infection:   Isolation            No Isolation          Patient Infection Status       Infection Onset Added Last Indicated Last Indicated By Review Planned Expiration Resolved Resolved By    MRSA 24 Culture, Anaerobic and Aerobic        Resolved    Rhinovirus 23 Respiratory Panel, Molecular, with COVID-19 (Restricted: peds pts or suitable admitted adults)

## 2025-02-24 NOTE — DISCHARGE INSTRUCTIONS
Concerning your diarrhea:    Push fluids (electrolyte solutions like gatorade or poweraid are best) to keep up with fluid losses from diarrhea.    Your physical exam and clinical findings did not suggest that you have a serious illness or disease that would require surgery or for you to stay in the hospital at this time, however, some diseases may take a while to become clear. Appendicitis or gall bladder issues, for example, may take 12-18 hours for the progression of this disease, and at times even longer.    If your pain becomes increasingly uncomfortable, you develop persistent fevers or chills, you develop severe nausea and vomiting and are unable to keep fluids down, or your pain moves toward your right upper (site of your gall bladder) or right lower abdomen (site of your appendix), then you need immediate reevaluation as we discussed.  If you should have any of these symptoms or your pain worsens or changes in quality or character, please return here or go to the nearest ER.    Follow-up with your primary care doctor as we discussed in the next 24-48 hours.    The examination and treatment you have received in the Emergency Department has been given on an emergency basis only.    This limited encounter is not a replacement for the comprehensive services provided by a primary care physician. We recommend follow up for further preventative and ongoing william screening, especially if your symptoms persists, worsen, or change in quality or character. When calling for a follow up appointment, please remember to inform the office that you were seen in the Emergency Department and that you are requesting a follow up visit.    Again, it is very important that you return immediately if your condition worsens, any new symptoms develop, or you do not recover as expected.

## 2025-04-01 ENCOUNTER — OFFICE VISIT (OUTPATIENT)
Dept: INTERNAL MEDICINE | Age: 50
End: 2025-04-01
Payer: MEDICAID

## 2025-04-01 VITALS
OXYGEN SATURATION: 98 % | HEIGHT: 75 IN | BODY MASS INDEX: 32.95 KG/M2 | SYSTOLIC BLOOD PRESSURE: 130 MMHG | WEIGHT: 265 LBS | DIASTOLIC BLOOD PRESSURE: 86 MMHG | HEART RATE: 80 BPM

## 2025-04-01 DIAGNOSIS — I10 ESSENTIAL HYPERTENSION: ICD-10-CM

## 2025-04-01 DIAGNOSIS — E11.9 TYPE 2 DIABETES MELLITUS WITHOUT COMPLICATION, UNSPECIFIED WHETHER LONG TERM INSULIN USE: Primary | ICD-10-CM

## 2025-04-01 DIAGNOSIS — E11.9 TYPE 2 DIABETES MELLITUS WITHOUT COMPLICATION, WITHOUT LONG-TERM CURRENT USE OF INSULIN: ICD-10-CM

## 2025-04-01 DIAGNOSIS — Z91.199 HISTORY OF NONCOMPLIANCE WITH MEDICAL TREATMENT: ICD-10-CM

## 2025-04-01 DIAGNOSIS — J34.89 SORE IN NOSE: ICD-10-CM

## 2025-04-01 DIAGNOSIS — M54.6 ACUTE MIDLINE THORACIC BACK PAIN: ICD-10-CM

## 2025-04-01 PROCEDURE — 3075F SYST BP GE 130 - 139MM HG: CPT | Performed by: INTERNAL MEDICINE

## 2025-04-01 PROCEDURE — 2022F DILAT RTA XM EVC RTNOPTHY: CPT | Performed by: INTERNAL MEDICINE

## 2025-04-01 PROCEDURE — 99214 OFFICE O/P EST MOD 30 MIN: CPT | Performed by: INTERNAL MEDICINE

## 2025-04-01 PROCEDURE — 4004F PT TOBACCO SCREEN RCVD TLK: CPT | Performed by: INTERNAL MEDICINE

## 2025-04-01 PROCEDURE — G8417 CALC BMI ABV UP PARAM F/U: HCPCS | Performed by: INTERNAL MEDICINE

## 2025-04-01 PROCEDURE — 3046F HEMOGLOBIN A1C LEVEL >9.0%: CPT | Performed by: INTERNAL MEDICINE

## 2025-04-01 PROCEDURE — 3017F COLORECTAL CA SCREEN DOC REV: CPT | Performed by: INTERNAL MEDICINE

## 2025-04-01 PROCEDURE — G8427 DOCREV CUR MEDS BY ELIG CLIN: HCPCS | Performed by: INTERNAL MEDICINE

## 2025-04-01 PROCEDURE — 3079F DIAST BP 80-89 MM HG: CPT | Performed by: INTERNAL MEDICINE

## 2025-04-01 RX ORDER — METOPROLOL SUCCINATE 50 MG/1
50 TABLET, EXTENDED RELEASE ORAL DAILY
Qty: 30 TABLET | Refills: 0 | Status: SHIPPED | OUTPATIENT
Start: 2025-04-01

## 2025-04-01 RX ORDER — GLIPIZIDE 10 MG/1
10 TABLET ORAL 2 TIMES DAILY
Qty: 60 TABLET | Refills: 0 | Status: SHIPPED | OUTPATIENT
Start: 2025-04-01

## 2025-04-01 RX ORDER — ATORVASTATIN CALCIUM 80 MG/1
80 TABLET, FILM COATED ORAL NIGHTLY
Qty: 30 TABLET | Refills: 0 | Status: SHIPPED | OUTPATIENT
Start: 2025-04-01

## 2025-04-01 RX ORDER — LOSARTAN POTASSIUM 50 MG/1
50 TABLET ORAL DAILY
Qty: 30 TABLET | Refills: 0 | Status: SHIPPED | OUTPATIENT
Start: 2025-04-01

## 2025-04-01 RX ORDER — SEMAGLUTIDE 1.34 MG/ML
1 INJECTION, SOLUTION SUBCUTANEOUS
Qty: 3 ML | Refills: 0 | Status: SHIPPED | OUTPATIENT
Start: 2025-04-01

## 2025-04-01 SDOH — ECONOMIC STABILITY: FOOD INSECURITY: WITHIN THE PAST 12 MONTHS, YOU WORRIED THAT YOUR FOOD WOULD RUN OUT BEFORE YOU GOT MONEY TO BUY MORE.: NEVER TRUE

## 2025-04-01 SDOH — ECONOMIC STABILITY: FOOD INSECURITY: WITHIN THE PAST 12 MONTHS, THE FOOD YOU BOUGHT JUST DIDN'T LAST AND YOU DIDN'T HAVE MONEY TO GET MORE.: NEVER TRUE

## 2025-04-01 ASSESSMENT — ENCOUNTER SYMPTOMS
SINUS PRESSURE: 0
ABDOMINAL DISTENTION: 0
BACK PAIN: 0
WHEEZING: 0
BLOOD IN STOOL: 0
VOMITING: 0
SHORTNESS OF BREATH: 0
EYE DISCHARGE: 0
NAUSEA: 0
RHINORRHEA: 1
SORE THROAT: 0
ABDOMINAL PAIN: 0
EYE ITCHING: 0
TROUBLE SWALLOWING: 0

## 2025-04-01 ASSESSMENT — PATIENT HEALTH QUESTIONNAIRE - PHQ9
SUM OF ALL RESPONSES TO PHQ QUESTIONS 1-9: 0
2. FEELING DOWN, DEPRESSED OR HOPELESS: NOT AT ALL
1. LITTLE INTEREST OR PLEASURE IN DOING THINGS: NOT AT ALL
SUM OF ALL RESPONSES TO PHQ QUESTIONS 1-9: 0

## 2025-04-01 NOTE — PROGRESS NOTES
Pharmacy called stated that pt said he hasn't been on this medication since last year and due to that he would need to go back down as his script was for 1mg ozempic due to the probable cause of bad side effects, I have pended new script below

## 2025-04-01 NOTE — PROGRESS NOTES
is no guarding or rebound.   Musculoskeletal:         General: No tenderness or deformity. Normal range of motion.      Cervical back: Normal range of motion and neck supple.   Lymphadenopathy:      Cervical: No cervical adenopathy.   Skin:     General: Skin is warm and dry.      Coloration: Skin is not pale.      Findings: No erythema or rash.   Neurological:      Mental Status: He is alert and oriented to person, place, and time.      Cranial Nerves: No cranial nerve deficit.      Motor: No abnormal muscle tone.      Coordination: Coordination normal.      Deep Tendon Reflexes: Reflexes are normal and symmetric. Reflexes normal.   Psychiatric:         Behavior: Behavior normal.         Thought Content: Thought content normal.         Judgment: Judgment normal.          Assessment:      Diagnosis Orders   1. Type 2 diabetes mellitus without complication, unspecified whether long term insulin use        2. Type 2 diabetes mellitus without complication, without long-term current use of insulin        3. Acute midline thoracic back pain        4. Essential hypertension        5. History of noncompliance with medical treatment              Assessment & Plan   Plan:     Patient is here to establish.  He got no Kimberly had not given up adult treatment for pediatrics.  I showed him the chart showed that he had 3 no-show her last appointments and if he kept his appointments with her even though she was leaving.  Also started out the appointment letting him know that if he wants to come here with all the meds he is on he is going to have to be compliant.  I then showed him the chart showing him how many times he has either canceled appointments or no showed appointments and it is a tremendous number.  He seems oblivious to the fact that he has had all these no-shows.    Diabetes mellitus.  He had not had a hemoglobin A1c in a while.  Will send him upstairs to the lab and get lab work on him.  I do not know where his latest

## 2025-04-14 ENCOUNTER — APPOINTMENT (OUTPATIENT)
Dept: GENERAL RADIOLOGY | Facility: HOSPITAL | Age: 50
DRG: 580 | End: 2025-04-14
Payer: COMMERCIAL

## 2025-04-14 ENCOUNTER — HOSPITAL ENCOUNTER (INPATIENT)
Facility: HOSPITAL | Age: 50
LOS: 5 days | Discharge: HOME | DRG: 580 | End: 2025-04-19
Attending: FAMILY MEDICINE | Admitting: HOSPITALIST
Payer: COMMERCIAL

## 2025-04-14 DIAGNOSIS — M86.9 OSTEOMYELITIS, UNSPECIFIED SITE, UNSPECIFIED TYPE: Primary | ICD-10-CM

## 2025-04-14 DIAGNOSIS — M86.9 OSTEOMYELITIS OF RIGHT HAND, UNSPECIFIED TYPE: ICD-10-CM

## 2025-04-14 LAB
ALBUMIN SERPL-MCNC: 4.3 G/DL (ref 3.5–5.2)
ALBUMIN/GLOB SERPL: 1.4 G/DL
ALP SERPL-CCNC: 68 U/L (ref 39–117)
ALT SERPL W P-5'-P-CCNC: 32 U/L (ref 1–41)
ANION GAP SERPL CALCULATED.3IONS-SCNC: 15 MMOL/L (ref 5–15)
ANION GAP SERPL CALCULATED.3IONS-SCNC: 17 MMOL/L (ref 5–15)
ANION GAP SERPL CALCULATED.3IONS-SCNC: 20 MMOL/L (ref 5–15)
AST SERPL-CCNC: 26 U/L (ref 1–40)
BASOPHILS # BLD AUTO: 0.1 10*3/MM3 (ref 0–0.2)
BASOPHILS # BLD AUTO: 0.12 10*3/MM3 (ref 0–0.2)
BASOPHILS NFR BLD AUTO: 1.2 % (ref 0–1.5)
BASOPHILS NFR BLD AUTO: 1.5 % (ref 0–1.5)
BILIRUB SERPL-MCNC: 0.3 MG/DL (ref 0–1.2)
BUN SERPL-MCNC: 16 MG/DL (ref 6–20)
BUN SERPL-MCNC: 16 MG/DL (ref 6–20)
BUN SERPL-MCNC: 17 MG/DL (ref 6–20)
BUN/CREAT SERPL: 13.3 (ref 7–25)
BUN/CREAT SERPL: 13.9 (ref 7–25)
BUN/CREAT SERPL: 16 (ref 7–25)
CALCIUM SPEC-SCNC: 9.3 MG/DL (ref 8.6–10.5)
CALCIUM SPEC-SCNC: 9.8 MG/DL (ref 8.6–10.5)
CALCIUM SPEC-SCNC: 9.9 MG/DL (ref 8.6–10.5)
CHLORIDE SERPL-SCNC: 100 MMOL/L (ref 98–107)
CHLORIDE SERPL-SCNC: 97 MMOL/L (ref 98–107)
CHLORIDE SERPL-SCNC: 97 MMOL/L (ref 98–107)
CK SERPL-CCNC: 628 U/L (ref 20–200)
CO2 SERPL-SCNC: 18 MMOL/L (ref 22–29)
CO2 SERPL-SCNC: 21 MMOL/L (ref 22–29)
CO2 SERPL-SCNC: 21 MMOL/L (ref 22–29)
CREAT SERPL-MCNC: 1 MG/DL (ref 0.76–1.27)
CREAT SERPL-MCNC: 1.2 MG/DL (ref 0.76–1.27)
CREAT SERPL-MCNC: 1.22 MG/DL (ref 0.76–1.27)
CRP SERPL-MCNC: 0.47 MG/DL (ref 0–0.5)
D-LACTATE SERPL-SCNC: 2.1 MMOL/L (ref 0.5–2)
D-LACTATE SERPL-SCNC: 2.8 MMOL/L (ref 0.5–2)
DEPRECATED RDW RBC AUTO: 41.1 FL (ref 37–54)
DEPRECATED RDW RBC AUTO: 41.8 FL (ref 37–54)
EGFRCR SERPLBLD CKD-EPI 2021: 72.2 ML/MIN/1.73
EGFRCR SERPLBLD CKD-EPI 2021: 73.7 ML/MIN/1.73
EGFRCR SERPLBLD CKD-EPI 2021: 91.7 ML/MIN/1.73
EOSINOPHIL # BLD AUTO: 0.1 10*3/MM3 (ref 0–0.4)
EOSINOPHIL # BLD AUTO: 0.12 10*3/MM3 (ref 0–0.4)
EOSINOPHIL NFR BLD AUTO: 1.2 % (ref 0.3–6.2)
EOSINOPHIL NFR BLD AUTO: 1.5 % (ref 0.3–6.2)
ERYTHROCYTE [DISTWIDTH] IN BLOOD BY AUTOMATED COUNT: 13.1 % (ref 12.3–15.4)
ERYTHROCYTE [DISTWIDTH] IN BLOOD BY AUTOMATED COUNT: 13.1 % (ref 12.3–15.4)
ERYTHROCYTE [SEDIMENTATION RATE] IN BLOOD: 28 MM/HR (ref 0–15)
GLOBULIN UR ELPH-MCNC: 3.1 GM/DL
GLUCOSE BLDC GLUCOMTR-MCNC: 231 MG/DL (ref 70–130)
GLUCOSE BLDC GLUCOMTR-MCNC: 267 MG/DL (ref 70–130)
GLUCOSE SERPL-MCNC: 245 MG/DL (ref 65–99)
GLUCOSE SERPL-MCNC: 327 MG/DL (ref 65–99)
GLUCOSE SERPL-MCNC: 331 MG/DL (ref 65–99)
HBA1C MFR BLD: 9.3 % (ref 4.8–5.6)
HCT VFR BLD AUTO: 45.1 % (ref 37.5–51)
HCT VFR BLD AUTO: 45.9 % (ref 37.5–51)
HGB BLD-MCNC: 15.1 G/DL (ref 13–17.7)
HGB BLD-MCNC: 15.4 G/DL (ref 13–17.7)
IMM GRANULOCYTES # BLD AUTO: 0.02 10*3/MM3 (ref 0–0.05)
IMM GRANULOCYTES # BLD AUTO: 0.03 10*3/MM3 (ref 0–0.05)
IMM GRANULOCYTES NFR BLD AUTO: 0.2 % (ref 0–0.5)
IMM GRANULOCYTES NFR BLD AUTO: 0.4 % (ref 0–0.5)
LYMPHOCYTES # BLD AUTO: 2.35 10*3/MM3 (ref 0.7–3.1)
LYMPHOCYTES # BLD AUTO: 2.75 10*3/MM3 (ref 0.7–3.1)
LYMPHOCYTES NFR BLD AUTO: 29 % (ref 19.6–45.3)
LYMPHOCYTES NFR BLD AUTO: 33.7 % (ref 19.6–45.3)
MAGNESIUM SERPL-MCNC: 1.9 MG/DL (ref 1.6–2.6)
MCH RBC QN AUTO: 29.2 PG (ref 26.6–33)
MCH RBC QN AUTO: 29.3 PG (ref 26.6–33)
MCHC RBC AUTO-ENTMCNC: 33.5 G/DL (ref 31.5–35.7)
MCHC RBC AUTO-ENTMCNC: 33.6 G/DL (ref 31.5–35.7)
MCV RBC AUTO: 87.1 FL (ref 79–97)
MCV RBC AUTO: 87.3 FL (ref 79–97)
MONOCYTES # BLD AUTO: 0.56 10*3/MM3 (ref 0.1–0.9)
MONOCYTES # BLD AUTO: 0.57 10*3/MM3 (ref 0.1–0.9)
MONOCYTES NFR BLD AUTO: 6.9 % (ref 5–12)
MONOCYTES NFR BLD AUTO: 7 % (ref 5–12)
MRSA DNA SPEC QL NAA+PROBE: ABNORMAL
NEUTROPHILS NFR BLD AUTO: 4.6 10*3/MM3 (ref 1.7–7)
NEUTROPHILS NFR BLD AUTO: 4.96 10*3/MM3 (ref 1.7–7)
NEUTROPHILS NFR BLD AUTO: 56.2 % (ref 42.7–76)
NEUTROPHILS NFR BLD AUTO: 61.2 % (ref 42.7–76)
NRBC BLD AUTO-RTO: 0 /100 WBC (ref 0–0.2)
NRBC BLD AUTO-RTO: 0 /100 WBC (ref 0–0.2)
PLATELET # BLD AUTO: 276 10*3/MM3 (ref 140–450)
PLATELET # BLD AUTO: 282 10*3/MM3 (ref 140–450)
PMV BLD AUTO: 10.6 FL (ref 6–12)
PMV BLD AUTO: 10.8 FL (ref 6–12)
POTASSIUM SERPL-SCNC: 3.8 MMOL/L (ref 3.5–5.2)
POTASSIUM SERPL-SCNC: 4 MMOL/L (ref 3.5–5.2)
POTASSIUM SERPL-SCNC: 4 MMOL/L (ref 3.5–5.2)
PROT SERPL-MCNC: 7.4 G/DL (ref 6–8.5)
RBC # BLD AUTO: 5.18 10*6/MM3 (ref 4.14–5.8)
RBC # BLD AUTO: 5.26 10*6/MM3 (ref 4.14–5.8)
SODIUM SERPL-SCNC: 135 MMOL/L (ref 136–145)
SODIUM SERPL-SCNC: 135 MMOL/L (ref 136–145)
SODIUM SERPL-SCNC: 136 MMOL/L (ref 136–145)
WBC NRBC COR # BLD AUTO: 8.11 10*3/MM3 (ref 3.4–10.8)
WBC NRBC COR # BLD AUTO: 8.17 10*3/MM3 (ref 3.4–10.8)

## 2025-04-14 PROCEDURE — 25010000002 ONDANSETRON PER 1 MG: Performed by: HOSPITALIST

## 2025-04-14 PROCEDURE — 87040 BLOOD CULTURE FOR BACTERIA: CPT | Performed by: FAMILY MEDICINE

## 2025-04-14 PROCEDURE — 80053 COMPREHEN METABOLIC PANEL: CPT | Performed by: FAMILY MEDICINE

## 2025-04-14 PROCEDURE — 85025 COMPLETE CBC W/AUTO DIFF WBC: CPT | Performed by: HOSPITALIST

## 2025-04-14 PROCEDURE — 82948 REAGENT STRIP/BLOOD GLUCOSE: CPT

## 2025-04-14 PROCEDURE — 86140 C-REACTIVE PROTEIN: CPT | Performed by: FAMILY MEDICINE

## 2025-04-14 PROCEDURE — 99285 EMERGENCY DEPT VISIT HI MDM: CPT

## 2025-04-14 PROCEDURE — 83605 ASSAY OF LACTIC ACID: CPT | Performed by: HOSPITALIST

## 2025-04-14 PROCEDURE — 87641 MR-STAPH DNA AMP PROBE: CPT | Performed by: HOSPITALIST

## 2025-04-14 PROCEDURE — 63710000001 INSULIN LISPRO (HUMAN) PER 5 UNITS: Performed by: HOSPITALIST

## 2025-04-14 PROCEDURE — 25810000003 SODIUM CHLORIDE 0.9 % SOLUTION: Performed by: HOSPITALIST

## 2025-04-14 PROCEDURE — 85652 RBC SED RATE AUTOMATED: CPT | Performed by: FAMILY MEDICINE

## 2025-04-14 PROCEDURE — 25010000002 MORPHINE PER 10 MG: Performed by: HOSPITALIST

## 2025-04-14 PROCEDURE — 36415 COLL VENOUS BLD VENIPUNCTURE: CPT

## 2025-04-14 PROCEDURE — 82550 ASSAY OF CK (CPK): CPT | Performed by: FAMILY MEDICINE

## 2025-04-14 PROCEDURE — 83605 ASSAY OF LACTIC ACID: CPT | Performed by: FAMILY MEDICINE

## 2025-04-14 PROCEDURE — 83735 ASSAY OF MAGNESIUM: CPT | Performed by: FAMILY MEDICINE

## 2025-04-14 PROCEDURE — 25010000002 PIPERACILLIN SOD-TAZOBACTAM PER 1 G: Performed by: HOSPITALIST

## 2025-04-14 PROCEDURE — 25010000002 VANCOMYCIN 1 G RECONSTITUTED SOLUTION 1 EACH VIAL: Performed by: FAMILY MEDICINE

## 2025-04-14 PROCEDURE — 25810000003 SODIUM CHLORIDE 0.9 % SOLUTION 500 ML FLEX CONT: Performed by: FAMILY MEDICINE

## 2025-04-14 PROCEDURE — 85025 COMPLETE CBC W/AUTO DIFF WBC: CPT | Performed by: FAMILY MEDICINE

## 2025-04-14 PROCEDURE — 83036 HEMOGLOBIN GLYCOSYLATED A1C: CPT | Performed by: HOSPITALIST

## 2025-04-14 PROCEDURE — 73130 X-RAY EXAM OF HAND: CPT

## 2025-04-14 RX ORDER — SULFAMETHOXAZOLE AND TRIMETHOPRIM 800; 160 MG/1; MG/1
1 TABLET ORAL 2 TIMES DAILY
COMMUNITY
Start: 2025-04-07 | End: 2025-04-19 | Stop reason: HOSPADM

## 2025-04-14 RX ORDER — OXYCODONE AND ACETAMINOPHEN 5; 325 MG/1; MG/1
1 TABLET ORAL EVERY 8 HOURS PRN
Refills: 0 | Status: DISCONTINUED | OUTPATIENT
Start: 2025-04-14 | End: 2025-04-19 | Stop reason: HOSPADM

## 2025-04-14 RX ORDER — NICOTINE POLACRILEX 4 MG
15 LOZENGE BUCCAL
Status: DISCONTINUED | OUTPATIENT
Start: 2025-04-14 | End: 2025-04-19 | Stop reason: HOSPADM

## 2025-04-14 RX ORDER — AMOXICILLIN 250 MG
2 CAPSULE ORAL 2 TIMES DAILY PRN
Status: DISCONTINUED | OUTPATIENT
Start: 2025-04-14 | End: 2025-04-19 | Stop reason: HOSPADM

## 2025-04-14 RX ORDER — POLYETHYLENE GLYCOL 3350 17 G/17G
17 POWDER, FOR SOLUTION ORAL DAILY PRN
Status: DISCONTINUED | OUTPATIENT
Start: 2025-04-14 | End: 2025-04-19 | Stop reason: HOSPADM

## 2025-04-14 RX ORDER — ACETAMINOPHEN 325 MG/1
650 TABLET ORAL EVERY 4 HOURS PRN
Status: DISCONTINUED | OUTPATIENT
Start: 2025-04-14 | End: 2025-04-19 | Stop reason: HOSPADM

## 2025-04-14 RX ORDER — BISACODYL 5 MG/1
5 TABLET, DELAYED RELEASE ORAL DAILY PRN
Status: DISCONTINUED | OUTPATIENT
Start: 2025-04-14 | End: 2025-04-19 | Stop reason: HOSPADM

## 2025-04-14 RX ORDER — ACETAMINOPHEN 160 MG/5ML
650 SOLUTION ORAL EVERY 4 HOURS PRN
Status: DISCONTINUED | OUTPATIENT
Start: 2025-04-14 | End: 2025-04-19 | Stop reason: HOSPADM

## 2025-04-14 RX ORDER — INSULIN LISPRO 100 [IU]/ML
4-24 INJECTION, SOLUTION INTRAVENOUS; SUBCUTANEOUS
Status: DISCONTINUED | OUTPATIENT
Start: 2025-04-14 | End: 2025-04-19 | Stop reason: HOSPADM

## 2025-04-14 RX ORDER — ACETAMINOPHEN 650 MG/1
650 SUPPOSITORY RECTAL EVERY 4 HOURS PRN
Status: DISCONTINUED | OUTPATIENT
Start: 2025-04-14 | End: 2025-04-19 | Stop reason: HOSPADM

## 2025-04-14 RX ORDER — DEXTROSE MONOHYDRATE 25 G/50ML
25 INJECTION, SOLUTION INTRAVENOUS
Status: DISCONTINUED | OUTPATIENT
Start: 2025-04-14 | End: 2025-04-19 | Stop reason: HOSPADM

## 2025-04-14 RX ORDER — SEMAGLUTIDE 1.34 MG/ML
1 INJECTION, SOLUTION SUBCUTANEOUS WEEKLY
COMMUNITY

## 2025-04-14 RX ORDER — MORPHINE SULFATE 2 MG/ML
1 INJECTION, SOLUTION INTRAMUSCULAR; INTRAVENOUS EVERY 4 HOURS PRN
Status: DISCONTINUED | OUTPATIENT
Start: 2025-04-14 | End: 2025-04-18

## 2025-04-14 RX ORDER — SODIUM CHLORIDE 0.9 % (FLUSH) 0.9 %
10 SYRINGE (ML) INJECTION AS NEEDED
Status: DISCONTINUED | OUTPATIENT
Start: 2025-04-14 | End: 2025-04-19 | Stop reason: HOSPADM

## 2025-04-14 RX ORDER — SODIUM CHLORIDE 9 MG/ML
40 INJECTION, SOLUTION INTRAVENOUS AS NEEDED
Status: DISCONTINUED | OUTPATIENT
Start: 2025-04-14 | End: 2025-04-19 | Stop reason: HOSPADM

## 2025-04-14 RX ORDER — ONDANSETRON 2 MG/ML
4 INJECTION INTRAMUSCULAR; INTRAVENOUS EVERY 6 HOURS PRN
Status: DISCONTINUED | OUTPATIENT
Start: 2025-04-14 | End: 2025-04-19 | Stop reason: HOSPADM

## 2025-04-14 RX ORDER — SODIUM CHLORIDE 0.9 % (FLUSH) 0.9 %
10 SYRINGE (ML) INJECTION EVERY 12 HOURS SCHEDULED
Status: DISCONTINUED | OUTPATIENT
Start: 2025-04-14 | End: 2025-04-19 | Stop reason: HOSPADM

## 2025-04-14 RX ORDER — LOSARTAN POTASSIUM 50 MG/1
50 TABLET ORAL DAILY
COMMUNITY

## 2025-04-14 RX ORDER — SODIUM CHLORIDE 9 MG/ML
100 INJECTION, SOLUTION INTRAVENOUS CONTINUOUS
Status: DISCONTINUED | OUTPATIENT
Start: 2025-04-14 | End: 2025-04-15

## 2025-04-14 RX ORDER — IBUPROFEN 600 MG/1
1 TABLET ORAL
Status: DISCONTINUED | OUTPATIENT
Start: 2025-04-14 | End: 2025-04-19 | Stop reason: HOSPADM

## 2025-04-14 RX ORDER — NALOXONE HCL 0.4 MG/ML
0.4 VIAL (ML) INJECTION
Status: DISCONTINUED | OUTPATIENT
Start: 2025-04-14 | End: 2025-04-19 | Stop reason: HOSPADM

## 2025-04-14 RX ORDER — BISACODYL 10 MG
10 SUPPOSITORY, RECTAL RECTAL DAILY PRN
Status: DISCONTINUED | OUTPATIENT
Start: 2025-04-14 | End: 2025-04-19 | Stop reason: HOSPADM

## 2025-04-14 RX ADMIN — PIPERACILLIN AND TAZOBACTAM 3.38 G: 3; .375 INJECTION, POWDER, FOR SOLUTION INTRAVENOUS; PARENTERAL at 18:22

## 2025-04-14 RX ADMIN — ONDANSETRON 4 MG: 2 INJECTION INTRAMUSCULAR; INTRAVENOUS at 18:22

## 2025-04-14 RX ADMIN — INSULIN LISPRO 12 UNITS: 100 INJECTION, SOLUTION INTRAVENOUS; SUBCUTANEOUS at 20:37

## 2025-04-14 RX ADMIN — Medication 10 ML: at 20:27

## 2025-04-14 RX ADMIN — SODIUM CHLORIDE 100 ML/HR: 9 INJECTION, SOLUTION INTRAVENOUS at 18:23

## 2025-04-14 RX ADMIN — VANCOMYCIN HYDROCHLORIDE 2500 MG: 1 INJECTION, POWDER, LYOPHILIZED, FOR SOLUTION INTRAVENOUS at 16:55

## 2025-04-14 RX ADMIN — OXYCODONE HYDROCHLORIDE AND ACETAMINOPHEN 1 TABLET: 5; 325 TABLET ORAL at 18:22

## 2025-04-14 RX ADMIN — MORPHINE SULFATE 1 MG: 2 INJECTION, SOLUTION INTRAMUSCULAR; INTRAVENOUS at 17:01

## 2025-04-14 RX ADMIN — INSULIN LISPRO 8 UNITS: 100 INJECTION, SOLUTION INTRAVENOUS; SUBCUTANEOUS at 18:23

## 2025-04-14 NOTE — PROGRESS NOTES
"Pharmacy Dosing Service  Pharmacokinetics  Vancomycin Initial Evaluation  Assessment/Action/Plan:  Loading dose?: 2500 mg IVPB  Current Order: Vancomycin 1000 mg IVPB every 12 hours  Current end date:04/21/2025  Levels: Obtain Vancomycin trough prior to dose on 04/16/2025 at 05:00 am  Additional antimicrobial agent(s): Zosyn  MRSA Nasal PCR ordered: Yes (Vancomycin indication is pneumonia, bone/joint, SSTI or IAI)    Vancomycin dosage initiated based on population pharmacokinetic parameters. Pharmacy will continue to follow daily and adjust dose accordingly.     Subjective:  James Mar is a 49 y.o. male with a Vancomycin \"Pharmacy to Dose\" consult for the treatment of Bone and/or Joint Infection , day 1 of 7 of treatment.    AUC Model Data:  Loading dose: 2500 mg IV  Regimen: 1000 mg IV every 12 hours.  Start time: 06:00 on 04/15/2025  Exposure target: AUC24 (range) 400-600 mg/L.hr   AUC24,ss: 515 mg/L.hr  Probability of AUC24 > 400: 68 %  Ctrough,ss: 15 mg/L  Probability of Ctrough,ss > 20: 35 %  Probability of nephrotoxicity (Lodise GEOVANY 2009): 10 %    Objective:  Ht: 185.4 cm (73\"); Wt: 130 kg (286 lb 8 oz)  Estimated Creatinine Clearance: 104.1 mL/min (by C-G formula based on SCr of 1.2 mg/dL).   Creatinine   Date Value Ref Range Status   04/14/2025 1.20 0.76 - 1.27 mg/dL Final   02/23/2025 0.9 0.7 - 1.2 mg/dL Final   06/11/2024 0.8 0.5 - 1.2 mg/dL Final   04/09/2024 0.76 0.76 - 1.27 mg/dL Final   01/23/2016 0.70 0.5 - 1.4 mg/dL Final      Lab Results   Component Value Date    WBC 8.11 04/14/2025    WBC 7.8 02/23/2025    WBC 6.6 06/11/2024      Baseline culture results:  Microbiology Results (last 10 days)       ** No results found for the last 240 hours. **            Wendy Chua, PharmD  04/14/25 17:11 CDT    "

## 2025-04-14 NOTE — ED NOTES
Pt states has been on Bactrim x1 week, has 1 day left for right index finger possible infection.  Pt states minimal drainage.  Pt was at urgent care and referred to ED

## 2025-04-14 NOTE — H&P
[facility] Medicine Services  HISTORY AND PHYSICAL    Date of Admission: 4/14/2025  Primary Care Physician: Radha Mortensen APRN    Subjective   Primary Historian: patient     Chief Complaint: R hand erythema edema drainage     Wound Infection  Abscess    49-year-old male with PMH of morbid obesity ? MARYBEL. DM  , HTN , CAD s/p stenting who states that he has an infection of his right index finger. He states that has been going on for about a week and getting worse. He states that he is not able to bend his finger anymore. He states that the swelling and redness is getting worse. He is on Bactrim. But no improvement of his symptoms. He has tried to drain it himself with no significant amount of drainage. Patient denies any other symptoms at this time   In ED wbc -ve, R hand xray ? Osteo 2nd distal phalanx, AG elevated ED did not feel the patient is in DKA, BC obtained started on vanco, will adm,it continue zosyn, vanco, SSI high, IVF check A1c, consult ID, ortho, scds for DVT prophylaxis, identify reconcile restart home meds once reconciled         Review of Systems   Otherwise complete ROS reviewed and negative except as mentioned in the HPI.    Past Medical History:   Past Medical History:   Diagnosis Date    Cellulitis and abscess of left leg     Class 1 obesity     Diabetes mellitus     DVT (deep venous thrombosis)     Hyperlipidemia     Hypertension     MRSA infection     Tobacco abuse      Past Surgical History:  Past Surgical History:   Procedure Laterality Date    APPENDECTOMY      CORONARY STENT PLACEMENT  2023    [facility]    LAPAROSCOPIC CHOLECYSTECTOMY      VARICOSE VEIN SURGERY Left 4/19/2024    Procedure: LEFT LOWER EXTREMITY RADIOFREQUENCY ABLATION;  Surgeon: Theodore Ch DO;  Location: [facility] OR;  Service: Vascular;  Laterality: Left;     Social History:  reports that he has been smoking cigarettes. He has been exposed to tobacco smoke. He has never  used smokeless tobacco. He reports current alcohol use. He reports current drug use. Drug: Marijuana.    Family History: family history includes Heart attack in his father, maternal uncle, and paternal uncle; Hypertension in his mother.       Allergies:  No Known Allergies    Medications:  Prior to Admission medications    Medication Sig Start Date End Date Taking? Authorizing Provider   albuterol sulfate  (90 Base) MCG/ACT inhaler Inhale 2 puffs Every 4 (Four) Hours As Needed for Wheezing.   Yes Triston Christian MD   aspirin 81 MG EC tablet Take 1 tablet by mouth Daily.   Yes Triston Christian MD   atorvastatin (LIPITOR) 80 MG tablet Take 1 tablet by mouth Daily.   Yes Triston Christian MD   glipizide (GLUCOTROL) 10 MG tablet Take 1 tablet by mouth 2 (Two) Times a Day Before Meals.   Yes Triston Christian MD   metFORMIN (GLUCOPHAGE) 500 MG tablet Take 1 tablet by mouth 2 (Two) Times a Day With Meals.   Yes Triston Christian MD   metoprolol succinate XL (TOPROL-XL) 50 MG 24 hr tablet Take 1 tablet by mouth Daily.   Yes Triston Christian MD   prasugrel (EFFIENT) 10 MG tablet Take 1 tablet by mouth Daily.   Yes Triston Christian MD   SEMAGLUTIDE, 1 MG/DOSE, SC Inject 0.5 mg under the skin into the appropriate area as directed 1 (One) Time Per Week. Sunday   Yes Triston Christian MD   AMOXICILLIN PO Take 1 tablet by mouth 2 (Two) Times a Day. FOR INFECTION IN NOSE  4/14/25  Triston Christian MD   furosemide (LASIX) 20 MG tablet Take 1 tablet by mouth 2 (Two) Times a Day As Needed.  4/14/25  Triston Christian MD   HYDROcodone-acetaminophen (NORCO) 5-325 MG per tablet Take 1 tablet by mouth Every 6 (Six) Hours As Needed (Pain). 4/19/24 4/14/25  Theodore Ch DO   losartan (COZAAR) 50 MG tablet Take 1 tablet by mouth Daily.  4/14/25  Triston Christian MD   ofloxacin (OCUFLOX) 0.3 % ophthalmic solution Administer 1 drop to both eyes 4 (Four) Times a Day As Needed.  "12/18/23 4/14/25  Triston Christian MD   sodium hypochlorite (DAKIN'S 1/4 STRENGTH) 0.125 % solution topical solution 0.125% Apply 1 mL topically to the appropriate area as directed 2 (Two) Times a Day. 1/25/24 4/14/25  Triston Christian MD   varenicline (CHANTIX) 1 MG tablet Take 1 tablet by mouth 2 (Two) Times a Day.  4/14/25  Triston Christian MD     I have utilized all available immediate resources to obtain, update, or review the patient's current medications (including all prescriptions, over-the-counter products, herbals, cannabis/cannabidiol products, and vitamin/mineral/dietary (nutritional) supplements).    Objective     Vital Signs: /79   Pulse 77   Temp 97.2 °F (36.2 °C) (Temporal)   Resp 18   Ht 185.4 cm (73\")   Wt 130 kg (286 lb 8 oz)   SpO2 97%   BMI 37.80 kg/m²   Physical Exam  Constitutional:       Appearance: He is obese.   HENT:      Head: Normocephalic.      Nose: Nose normal.   Cardiovascular:      Rate and Rhythm: Normal rate.   Pulmonary:      Breath sounds: Wheezing present.   Abdominal:      General: Bowel sounds are normal.   Musculoskeletal:      Cervical back: Normal range of motion.      Comments: R hand erythema edema drainage    Neurological:      General: No focal deficit present.              Results Reviewed:  Lab Results (last 24 hours)       Procedure Component Value Units Date/Time    Comprehensive Metabolic Panel [[id number]] Collected: 04/14/25 1452    Specimen: Blood from Arm, Left Updated: 04/14/25 1701    Hemoglobin A1c [[id number]] Collected: 04/14/25 1452    Specimen: Blood from Arm, Left Updated: 04/14/25 1659    Blood Culture - Blood, Arm, Left [[id number]] Collected: 04/14/25 1544    Specimen: Blood from Arm, Left Updated: 04/14/25 1552    Blood Culture - Blood, Hand, Left [[id number]] Collected: 04/14/25 1452    Specimen: Blood from Hand, Left Updated: 04/14/25 1552    C-reactive Protein [[id number]]  (Normal) Collected: 04/14/25 1452    Specimen: " Blood from Arm, Left Updated: 04/14/25 1548     C-Reactive Protein 0.47 mg/dL     CK [[id number]]  (Abnormal) Collected: 04/14/25 1452    Specimen: Blood from Arm, Left Updated: 04/14/25 1547     Creatine Kinase 628 U/L     Sedimentation Rate [[id number]]  (Abnormal) Collected: 04/14/25 1452    Specimen: Blood from Arm, Left Updated: 04/14/25 1539     Sed Rate 28 mm/hr     Lactic Acid, Plasma [[id number]]  (Abnormal) Collected: 04/14/25 1453    Specimen: Blood from Arm, Left Updated: 04/14/25 1518     Lactate 2.8 mmol/L     Basic Metabolic Panel [[id number]]  (Abnormal) Collected: 04/14/25 1452    Specimen: Blood from Arm, Left Updated: 04/14/25 1516     Glucose 327 mg/dL      BUN 16 mg/dL      Creatinine 1.20 mg/dL      Sodium 135 mmol/L      Potassium 4.0 mmol/L      Chloride 97 mmol/L      CO2 21.0 mmol/L      Calcium 9.8 mg/dL      BUN/Creatinine Ratio 13.3     Anion Gap 17.0 mmol/L      eGFR 73.7 mL/min/1.73     Narrative:      GFR Categories in Chronic Kidney Disease (CKD)      GFR Category          GFR (mL/min/1.73)    Interpretation  G1                     90 or greater         Normal or high (1)  G2                      60-89                Mild decrease (1)  G3a                   45-59                Mild to moderate decrease  G3b                   30-44                Moderate to severe decrease  G4                    15-29                Severe decrease  G5                    14 or less           Kidney failure          (1)In the absence of evidence of kidney disease, neither GFR category G1 or G2 fulfill the criteria for CKD.    eGFR calculation 2021 CKD-EPI creatinine equation, which does not include race as a factor    Magnesium [[id number]]  (Normal) Collected: 04/14/25 1452    Specimen: Blood from Arm, Left Updated: 04/14/25 1516     Magnesium 1.9 mg/dL     CBC & Differential [[id number]]  (Normal) Collected: 04/14/25 1452    Specimen: Blood from Arm, Left Updated: 04/14/25 1459    Narrative:      The  following orders were created for panel order CBC & Differential.  Procedure                               Abnormality         Status                     ---------                               -----------         ------                     CBC Auto Differential[[id number]]        Normal              Final result                 Please view results for these tests on the individual orders.    CBC Auto Differential [[id number]]  (Normal) Collected: 04/14/25 1452    Specimen: Blood from Arm, Left Updated: 04/14/25 1459     WBC 8.11 10*3/mm3      RBC 5.26 10*6/mm3      Hemoglobin 15.4 g/dL      Hematocrit 45.9 %      MCV 87.3 fL      MCH 29.3 pg      MCHC 33.6 g/dL      RDW 13.1 %      RDW-SD 41.1 fl      MPV 10.8 fL      Platelets 282 10*3/mm3      Neutrophil % 61.2 %      Lymphocyte % 29.0 %      Monocyte % 7.0 %      Eosinophil % 1.2 %      Basophil % 1.2 %      Immature Grans % 0.4 %      Neutrophils, Absolute 4.96 10*3/mm3      Lymphocytes, Absolute 2.35 10*3/mm3      Monocytes, Absolute 0.57 10*3/mm3      Eosinophils, Absolute 0.10 10*3/mm3      Basophils, Absolute 0.10 10*3/mm3      Immature Grans, Absolute 0.03 10*3/mm3      nRBC 0.0 /100 WBC           Imaging Results (Last 24 Hours)       Procedure Component Value Units Date/Time    XR Hand 3+ View Right [[id number]] Collected: 04/14/25 1522     Updated: 04/14/25 1529    Narrative:      EXAMINATION: XR HAND 3+ VW RIGHT-  4/14/2025 3:22 PM     HISTORY: Index finger infection     COMPARISON: None      TECHNIQUE:  Frontal, lateral and oblique radiographs of the RIGHT hand were provided  for review.     FINDINGS:  Focal soft tissue swelling over the radial aspect of the distal second  phalanx with subtle lucency in the radial margin of the distal tuft of  the second distal phalanx. I am suspicious this may represent some  cortical erosion and osteomyelitis. Old healed second metacarpal  fracture noted. On the lateral view of the second digit, there is  cortical  disruption along the palmar aspect of the distal phalanx. This  could potentially represent a focal fracture.          Impression:         1.  Abnormal appearance of the second distal phalanx with some cortical  bone loss along the radial margin of the second distal phalanx near the  area of soft tissue swelling. In addition there is likely a small  fracture of the distal second phalanx as well. This may represent  sequelae of osteomyelitis and a secondary fracture of the distal second  phalanx.         This report was signed and finalized on 4/14/2025 3:26 PM by Dr. Ramses Bocanegra MD.             I have personally reviewed and interpreted the radiology studies and ECG obtained at time of admission.     Assessment / Plan   Assessment:   Active Hospital Problems    Diagnosis     **Osteomyelitis    Impedeing DKA      Treatment Plan  The patient will be admitted to my service here at [facility] H49-year-old male with PMH of morbid obesity ? MARYBEL. DM  , HTN who states that he has an infection of his right index finger. He states that has been going on for about a week and getting worse. He states that he is not able to bend his finger anymore. He states that the swelling and redness is getting worse. He is on Bactrim. But no improvement of his symptoms. He has tried to drain it himself with no significant amount of drainage. Patient denies any other symptoms at this time   In ED wbc -ve, R hand xray ? Osteo 2nd distal phalanx, AG elevated ED did not feel the patient is in DKA, BC obtained started on vanco, will adm,it continue zosyn, vanco, SSI high, IVF check A1c, consult ID, ortho, scds for DVT prophylaxis, identify reconcile restart home meds once reconciled tristen Nelson.     Medical Decision Making  Number and Complexity of problems: 3 acute   Differential Diagnosis: as above     Conditions and Status        Condition is at treatment goal.     [facility] Data  External documents reviewed: yes  Cardiac tracing (EKG,  telemetry) interpretation: yes  Radiology interpretation: yes  Labs reviewed: yes  Any tests that were considered but not ordered: no     Decision rules/scores evaluated (example [id number], Wells, etc): no     Discussed with: ED     Care Planning  Shared decision making: Patient apprised of current labs, vitals, imaging and treatment plan.  They are agreeable with proceeding with plans as discussed.   Code status and discussions: full     Disposition  Social Determinants of Health that impact treatment or disposition: no  Estimated length of stay is TBD.     I confirmed that the patient's advanced care plan is present, code status is documented, and a surrogate decision maker is listed in the patient's medical record.         The patient was seen and examined by me on 1710 at [facility] .    Electronically signed by Ashanti Whatley MD, 04/14/25, 17:04 CDT.

## 2025-04-14 NOTE — ED PROVIDER NOTES
Subjective   History of Present Illness  49-year-old male states that he has an infection of his right index finger.  He states that has been going on for about a week and getting worse.  He states that he is not able to bend his finger anymore.  He states that the swelling and redness is getting worse.  He is on Bactrim.  But no improvement of his symptoms.  He has tried to drain it himself with no significant amount of drainage.  Patient denies any other symptoms at this time.      Review of Systems   All other systems reviewed and are negative.      Past Medical History:   Diagnosis Date    Cellulitis and abscess of left leg     Class 1 obesity     Diabetes mellitus     DVT (deep venous thrombosis)     Hyperlipidemia     Hypertension     MRSA infection     Tobacco abuse        No Known Allergies    Past Surgical History:   Procedure Laterality Date    APPENDECTOMY      CORONARY STENT PLACEMENT  2023    [facility]    LAPAROSCOPIC CHOLECYSTECTOMY      VARICOSE VEIN SURGERY Left 4/19/2024    Procedure: LEFT LOWER EXTREMITY RADIOFREQUENCY ABLATION;  Surgeon: Theodore Ch DO;  Location:  [facility];  Service: Vascular;  Laterality: Left;       Family History   Problem Relation Age of Onset    Hypertension Mother     Heart attack Father     Heart attack Maternal Uncle     Heart attack Paternal Uncle        Social History     Socioeconomic History    Marital status:      Spouse name: Mrs. Patrick Mar    Number of children: 3   Tobacco Use    Smoking status: Every Day     Current packs/day: 1.00     Types: Cigarettes     Passive exposure: Past    Smokeless tobacco: Never   Vaping Use    Vaping status: Never Used   Substance and Sexual Activity    Alcohol use: Yes     Comment: rare special occasions    Drug use: Yes     Types: Marijuana     Comment: every now and then    Sexual activity: Defer     Partners: Female           Objective   Physical Exam  Vitals and nursing note reviewed.   Constitutional:        Appearance: Normal appearance.   HENT:      Head: Normocephalic and atraumatic.      Mouth/Throat:      Mouth: Mucous membranes are moist.   Eyes:      Extraocular Movements: Extraocular movements intact.      Pupils: Pupils are equal, round, and reactive to light.   Cardiovascular:      Rate and Rhythm: Normal rate and regular rhythm.      Heart sounds: Normal heart sounds.   Pulmonary:      Effort: Pulmonary effort is normal.      Breath sounds: Normal breath sounds.   Musculoskeletal:      Comments: Swollen erythematous right index finger.  Tender to touch.  Range of motion limited.   Skin:     General: Skin is warm and dry.   Neurological:      General: No focal deficit present.      Mental Status: He is alert and oriented to person, place, and time.   Psychiatric:         Mood and Affect: Mood normal.         Behavior: Behavior normal.         Procedures           ED Course                                                     Lab Results (last 24 hours)       Procedure Component Value Units Date/Time    CBC & Differential [[id number]]  (Normal) Collected: 04/14/25 1452    Specimen: Blood from Arm, Left Updated: 04/14/25 3857    Narrative:      The following orders were created for panel order CBC & Differential.  Procedure                               Abnormality         Status                     ---------                               -----------         ------                     CBC Auto Differential[[id number]]        Normal              Final result                 Please view results for these tests on the individual orders.    Basic Metabolic Panel [[id number]]  (Abnormal) Collected: 04/14/25 1452    Specimen: Blood from Arm, Left Updated: 04/14/25 4998     Glucose 327 mg/dL      BUN 16 mg/dL      Creatinine 1.20 mg/dL      Sodium 135 mmol/L      Potassium 4.0 mmol/L      Chloride 97 mmol/L      CO2 21.0 mmol/L      Calcium 9.8 mg/dL      BUN/Creatinine Ratio 13.3     Anion Gap 17.0 mmol/L      eGFR  73.7 mL/min/1.73     Narrative:      GFR Categories in Chronic Kidney Disease (CKD)      GFR Category          GFR (mL/min/1.73)    Interpretation  G1                     90 or greater         Normal or high (1)  G2                      60-89                Mild decrease (1)  G3a                   45-59                Mild to moderate decrease  G3b                   30-44                Moderate to severe decrease  G4                    15-29                Severe decrease  G5                    14 or less           Kidney failure          (1)In the absence of evidence of kidney disease, neither GFR category G1 or G2 fulfill the criteria for CKD.    eGFR calculation 2021 CKD-EPI creatinine equation, which does not include race as a factor    Magnesium [[id number]]  (Normal) Collected: 04/14/25 1452    Specimen: Blood from Arm, Left Updated: 04/14/25 1516     Magnesium 1.9 mg/dL     CBC Auto Differential [[id number]]  (Normal) Collected: 04/14/25 1452    Specimen: Blood from Arm, Left Updated: 04/14/25 1459     WBC 8.11 10*3/mm3      RBC 5.26 10*6/mm3      Hemoglobin 15.4 g/dL      Hematocrit 45.9 %      MCV 87.3 fL      MCH 29.3 pg      MCHC 33.6 g/dL      RDW 13.1 %      RDW-SD 41.1 fl      MPV 10.8 fL      Platelets 282 10*3/mm3      Neutrophil % 61.2 %      Lymphocyte % 29.0 %      Monocyte % 7.0 %      Eosinophil % 1.2 %      Basophil % 1.2 %      Immature Grans % 0.4 %      Neutrophils, Absolute 4.96 10*3/mm3      Lymphocytes, Absolute 2.35 10*3/mm3      Monocytes, Absolute 0.57 10*3/mm3      Eosinophils, Absolute 0.10 10*3/mm3      Basophils, Absolute 0.10 10*3/mm3      Immature Grans, Absolute 0.03 10*3/mm3      nRBC 0.0 /100 WBC     Sedimentation Rate [[id number]]  (Abnormal) Collected: 04/14/25 1452    Specimen: Blood from Arm, Left Updated: 04/14/25 1539     Sed Rate 28 mm/hr     C-reactive Protein [[id number]]  (Normal) Collected: 04/14/25 1452    Specimen: Blood from Arm, Left Updated: 04/14/25 1546      C-Reactive Protein 0.47 mg/dL     CK [[id number]]  (Abnormal) Collected: 04/14/25 1452    Specimen: Blood from Arm, Left Updated: 04/14/25 1547     Creatine Kinase 628 U/L     Blood Culture - Blood, Hand, Left [[id number]] Collected: 04/14/25 1452    Specimen: Blood from Hand, Left Updated: 04/14/25 1552    Lactic Acid, Plasma [[id number]]  (Abnormal) Collected: 04/14/25 1453    Specimen: Blood from Arm, Left Updated: 04/14/25 1518     Lactate 2.8 mmol/L     Blood Culture - Blood, Arm, Left [[id number]] Collected: 04/14/25 1544    Specimen: Blood from Arm, Left Updated: 04/14/25 1552          XR Hand 3+ View Right   Final Result       1.  Abnormal appearance of the second distal phalanx with some cortical   bone loss along the radial margin of the second distal phalanx near the   area of soft tissue swelling. In addition there is likely a small   fracture of the distal second phalanx as well. This may represent   sequelae of osteomyelitis and a secondary fracture of the distal second   phalanx.            This report was signed and finalized on 4/14/2025 3:26 PM by Dr. Ramses Bocanegra MD.            Medications   sodium chloride 0.9 % flush 10 mL (has no administration in time range)   vancomycin (VANCOCIN) 2,500 mg in sodium chloride 0.9 % 500 mL IVPB (has no administration in time range)       Medical Decision Making  Dr. Whatley is the hospitalist on-call.  Case was discussed with him.  He has accepted the patient under his group service.    Problems Addressed:  Osteomyelitis, unspecified site, unspecified type: complicated acute illness or injury    Amount and/or Complexity of Data Reviewed  Labs: ordered. Decision-making details documented in ED Course.  Radiology: ordered. Decision-making details documented in ED Course.    Risk  Prescription drug management.  Decision regarding hospitalization.        Final diagnoses:   Osteomyelitis, unspecified site, unspecified type       ED Disposition  ED Disposition        ED Disposition   Decision to Admit    Condition   --    Comment   Level of Care: Remote Telemetry [26]   Diagnosis: Osteomyelitis [451615]   Admitting Physician: AAKASH BARNETT [[id number]]   Attending Physician: AAKASH BARNETT [[id number]]   Certification: I Certify That Inpatient Hospital Services Are Medically Necessary For Greater Than 2 Midnights                 No follow-up provider specified.       Medication List      No changes were made to your prescriptions during this visit.            Fabian Galo MD  04/14/25 0642

## 2025-04-15 ENCOUNTER — APPOINTMENT (OUTPATIENT)
Dept: MRI IMAGING | Facility: HOSPITAL | Age: 50
DRG: 580 | End: 2025-04-15
Payer: COMMERCIAL

## 2025-04-15 ENCOUNTER — APPOINTMENT (OUTPATIENT)
Dept: GENERAL RADIOLOGY | Facility: HOSPITAL | Age: 50
DRG: 580 | End: 2025-04-15
Payer: COMMERCIAL

## 2025-04-15 LAB
ANION GAP SERPL CALCULATED.3IONS-SCNC: 11 MMOL/L (ref 5–15)
BASOPHILS # BLD AUTO: 0.07 10*3/MM3 (ref 0–0.2)
BASOPHILS NFR BLD AUTO: 1 % (ref 0–1.5)
BUN SERPL-MCNC: 14 MG/DL (ref 6–20)
BUN/CREAT SERPL: 13.1 (ref 7–25)
CALCIUM SPEC-SCNC: 8.8 MG/DL (ref 8.6–10.5)
CHLORIDE SERPL-SCNC: 105 MMOL/L (ref 98–107)
CO2 SERPL-SCNC: 21 MMOL/L (ref 22–29)
CREAT SERPL-MCNC: 1.07 MG/DL (ref 0.76–1.27)
DEPRECATED RDW RBC AUTO: 42 FL (ref 37–54)
EGFRCR SERPLBLD CKD-EPI 2021: 84.5 ML/MIN/1.73
EOSINOPHIL # BLD AUTO: 0.13 10*3/MM3 (ref 0–0.4)
EOSINOPHIL NFR BLD AUTO: 1.8 % (ref 0.3–6.2)
ERYTHROCYTE [DISTWIDTH] IN BLOOD BY AUTOMATED COUNT: 13.2 % (ref 12.3–15.4)
GLUCOSE BLDC GLUCOMTR-MCNC: 211 MG/DL (ref 70–130)
GLUCOSE BLDC GLUCOMTR-MCNC: 238 MG/DL (ref 70–130)
GLUCOSE BLDC GLUCOMTR-MCNC: 259 MG/DL (ref 70–130)
GLUCOSE BLDC GLUCOMTR-MCNC: 301 MG/DL (ref 70–130)
GLUCOSE SERPL-MCNC: 208 MG/DL (ref 65–99)
HCT VFR BLD AUTO: 42.8 % (ref 37.5–51)
HGB BLD-MCNC: 14.4 G/DL (ref 13–17.7)
IMM GRANULOCYTES # BLD AUTO: 0.04 10*3/MM3 (ref 0–0.05)
IMM GRANULOCYTES NFR BLD AUTO: 0.5 % (ref 0–0.5)
LYMPHOCYTES # BLD AUTO: 2.28 10*3/MM3 (ref 0.7–3.1)
LYMPHOCYTES NFR BLD AUTO: 31 % (ref 19.6–45.3)
MCH RBC QN AUTO: 29.4 PG (ref 26.6–33)
MCHC RBC AUTO-ENTMCNC: 33.6 G/DL (ref 31.5–35.7)
MCV RBC AUTO: 87.5 FL (ref 79–97)
MONOCYTES # BLD AUTO: 0.57 10*3/MM3 (ref 0.1–0.9)
MONOCYTES NFR BLD AUTO: 7.7 % (ref 5–12)
NEUTROPHILS NFR BLD AUTO: 4.27 10*3/MM3 (ref 1.7–7)
NEUTROPHILS NFR BLD AUTO: 58 % (ref 42.7–76)
NRBC BLD AUTO-RTO: 0 /100 WBC (ref 0–0.2)
PLATELET # BLD AUTO: 282 10*3/MM3 (ref 140–450)
PMV BLD AUTO: 11 FL (ref 6–12)
POTASSIUM SERPL-SCNC: 4.1 MMOL/L (ref 3.5–5.2)
RBC # BLD AUTO: 4.89 10*6/MM3 (ref 4.14–5.8)
SODIUM SERPL-SCNC: 137 MMOL/L (ref 136–145)
WBC NRBC COR # BLD AUTO: 7.36 10*3/MM3 (ref 3.4–10.8)

## 2025-04-15 PROCEDURE — 70250 X-RAY EXAM OF SKULL: CPT

## 2025-04-15 PROCEDURE — 25010000002 PIPERACILLIN SOD-TAZOBACTAM PER 1 G: Performed by: HOSPITALIST

## 2025-04-15 PROCEDURE — 80048 BASIC METABOLIC PNL TOTAL CA: CPT | Performed by: HOSPITALIST

## 2025-04-15 PROCEDURE — 25010000002 VANCOMYCIN 1 G RECONSTITUTED SOLUTION 1 EACH VIAL: Performed by: HOSPITALIST

## 2025-04-15 PROCEDURE — 25810000003 SODIUM CHLORIDE 0.9 % SOLUTION 250 ML FLEX CONT: Performed by: HOSPITALIST

## 2025-04-15 PROCEDURE — 73218 MRI UPPER EXTREMITY W/O DYE: CPT

## 2025-04-15 PROCEDURE — 36415 COLL VENOUS BLD VENIPUNCTURE: CPT | Performed by: HOSPITALIST

## 2025-04-15 PROCEDURE — 25810000003 SODIUM CHLORIDE 0.9 % SOLUTION: Performed by: HOSPITALIST

## 2025-04-15 PROCEDURE — 85025 COMPLETE CBC W/AUTO DIFF WBC: CPT | Performed by: HOSPITALIST

## 2025-04-15 PROCEDURE — 63710000001 INSULIN LISPRO (HUMAN) PER 5 UNITS: Performed by: HOSPITALIST

## 2025-04-15 PROCEDURE — 82948 REAGENT STRIP/BLOOD GLUCOSE: CPT

## 2025-04-15 PROCEDURE — 99222 1ST HOSP IP/OBS MODERATE 55: CPT | Performed by: INTERNAL MEDICINE

## 2025-04-15 PROCEDURE — 25010000002 MORPHINE PER 10 MG: Performed by: HOSPITALIST

## 2025-04-15 RX ADMIN — OXYCODONE HYDROCHLORIDE AND ACETAMINOPHEN 1 TABLET: 5; 325 TABLET ORAL at 11:56

## 2025-04-15 RX ADMIN — Medication 10 ML: at 09:58

## 2025-04-15 RX ADMIN — PIPERACILLIN AND TAZOBACTAM 3.38 G: 3; .375 INJECTION, POWDER, FOR SOLUTION INTRAVENOUS; PARENTERAL at 08:48

## 2025-04-15 RX ADMIN — INSULIN LISPRO 12 UNITS: 100 INJECTION, SOLUTION INTRAVENOUS; SUBCUTANEOUS at 12:10

## 2025-04-15 RX ADMIN — INSULIN LISPRO 8 UNITS: 100 INJECTION, SOLUTION INTRAVENOUS; SUBCUTANEOUS at 17:50

## 2025-04-15 RX ADMIN — PIPERACILLIN AND TAZOBACTAM 3.38 G: 3; .375 INJECTION, POWDER, FOR SOLUTION INTRAVENOUS; PARENTERAL at 00:18

## 2025-04-15 RX ADMIN — SODIUM CHLORIDE 100 ML/HR: 9 INJECTION, SOLUTION INTRAVENOUS at 04:28

## 2025-04-15 RX ADMIN — Medication 10 ML: at 20:21

## 2025-04-15 RX ADMIN — VANCOMYCIN HYDROCHLORIDE 1000 MG: 1 INJECTION, POWDER, LYOPHILIZED, FOR SOLUTION INTRAVENOUS at 17:50

## 2025-04-15 RX ADMIN — INSULIN LISPRO 8 UNITS: 100 INJECTION, SOLUTION INTRAVENOUS; SUBCUTANEOUS at 08:48

## 2025-04-15 RX ADMIN — MORPHINE SULFATE 1 MG: 2 INJECTION, SOLUTION INTRAMUSCULAR; INTRAVENOUS at 08:47

## 2025-04-15 RX ADMIN — MORPHINE SULFATE 1 MG: 2 INJECTION, SOLUTION INTRAMUSCULAR; INTRAVENOUS at 13:00

## 2025-04-15 RX ADMIN — MORPHINE SULFATE 1 MG: 2 INJECTION, SOLUTION INTRAMUSCULAR; INTRAVENOUS at 17:53

## 2025-04-15 RX ADMIN — MORPHINE SULFATE 1 MG: 2 INJECTION, SOLUTION INTRAMUSCULAR; INTRAVENOUS at 22:11

## 2025-04-15 RX ADMIN — MORPHINE SULFATE 1 MG: 2 INJECTION, SOLUTION INTRAMUSCULAR; INTRAVENOUS at 01:16

## 2025-04-15 RX ADMIN — INSULIN LISPRO 16 UNITS: 100 INJECTION, SOLUTION INTRAVENOUS; SUBCUTANEOUS at 20:27

## 2025-04-15 RX ADMIN — PIPERACILLIN AND TAZOBACTAM 3.38 G: 3; .375 INJECTION, POWDER, FOR SOLUTION INTRAVENOUS; PARENTERAL at 22:55

## 2025-04-15 RX ADMIN — VANCOMYCIN HYDROCHLORIDE 1000 MG: 1 INJECTION, POWDER, LYOPHILIZED, FOR SOLUTION INTRAVENOUS at 05:28

## 2025-04-15 NOTE — SIGNIFICANT NOTE
Was called on this patient for having a brief episode of bradycardia according to telemetry with heart rate dropping to 35 and going back spontaneously to normal rate and rhythm.  Day team to follow.

## 2025-04-15 NOTE — CONSULTS
Orthopaedic Surgery Consult Note      4/15/2025   07:05 CDT    Name:  James Mar  MRN:    [id number]     Acct:     [id number]   Room:  [facility]/1   Day: 1     Admit Date: 4/14/2025  PCP: Radha Mortensen APRN    Consulting Provider: Dr. Ashanti Whatley    Subjective:     C/C: Right index finger pain, swelling, redness    HPI: Mr. Mar is a 49-year-old right-hand-dominant gentleman who was admitted to the ER yesterday with an approximately 1 week history of progressive right index finger distal phalanx pain, swelling, redness and decreased range of motion.  He has been evaluated at an outlying urgent care and initially prescribed Bactrim for approximately 1 week.  He returned yesterday for repeat evaluation and was subsequently sent to [facility].  Radiographs were obtained which were concerning for possible osteomyelitis.  Therefore, he was admitted for IV antibiotics and further evaluation/management.  Orthopedics was consulted for evaluation of the right index finger.  On interview this morning, he reports ongoing, relatively stable pain from admission.  Localizes pain about the index finger.  Denies any other joint pain.  Denies any subjective fever or chills.  Denies nausea/vomiting.  Of note, he does have history of MRSA.    Code Status:    Code Status and Medical Interventions: CPR (Attempt to Resuscitate); Full Support   Ordered at: 04/14/25 1658     Code Status (Patient has no pulse and is not breathing):    CPR (Attempt to Resuscitate)     Medical Interventions (Patient has pulse or is breathing):    Full Support         Past Medical History:    Past Medical History:   Diagnosis Date    Cellulitis and abscess of left leg     Class 1 obesity     Diabetes mellitus     DVT (deep venous thrombosis)     Hyperlipidemia     Hypertension     MRSA infection     Tobacco abuse        Past Surgical History:    Past Surgical History:   Procedure Laterality Date    APPENDECTOMY      CORONARY STENT  PLACEMENT  2023    [facility]    LAPAROSCOPIC CHOLECYSTECTOMY      VARICOSE VEIN SURGERY Left 4/19/2024    Procedure: LEFT LOWER EXTREMITY RADIOFREQUENCY ABLATION;  Surgeon: Theodore Ch DO;  Location: [facility] HYBRID OR;  Service: Vascular;  Laterality: Left;       Current Medications:   Prior to Admission medications    Medication Sig Start Date End Date Taking? Authorizing Provider   aspirin 81 MG EC tablet Take 1 tablet by mouth Daily.   Yes Triston Christian MD   atorvastatin (LIPITOR) 80 MG tablet Take 1 tablet by mouth Daily.   Yes Triston Christian MD   glipizide (GLUCOTROL) 10 MG tablet Take 1 tablet by mouth 2 (Two) Times a Day Before Meals.   Yes Triston Christian MD   losartan (COZAAR) 50 MG tablet Take 1 tablet by mouth Daily.   Yes Triston Christian MD   metFORMIN (GLUCOPHAGE) 500 MG tablet Take 1 tablet by mouth 2 (Two) Times a Day With Meals.   Yes Triston Christian MD   metoprolol succinate XL (TOPROL-XL) 50 MG 24 hr tablet Take 1 tablet by mouth Daily.   Yes Triston Christian MD   prasugrel (EFFIENT) 10 MG tablet Take 1 tablet by mouth Daily.   Yes Trisotn Christian MD   Semaglutide, 1 MG/DOSE, (Ozempic, 1 MG/DOSE,) 4 MG/3ML solution pen-injector Inject 1 mg under the skin into the appropriate area as directed 1 (One) Time Per Week.   Yes Triston Christian MD   sulfamethoxazole-trimethoprim (BACTRIM DS,SEPTRA DS) 800-160 MG per tablet Take 1 tablet by mouth 2 (Two) Times a Day. X7 days 4/7/25 4/14/25 Yes Triston Christian MD   tiZANidine (ZANAFLEX) 4 MG tablet Take 1 tablet by mouth Every 8 (Eight) Hours As Needed for Muscle Spasms.   Yes Triston Christian MD       Allergies:  Patient has no known allergies.    Social History:   Social History     Socioeconomic History    Marital status:      Spouse name: Mrs. Patrick Mar    Number of children: 3   Tobacco Use    Smoking status: Every Day     Current packs/day: 1.00     Types: Cigarettes      "Passive exposure: Past    Smokeless tobacco: Never   Vaping Use    Vaping status: Never Used   Substance and Sexual Activity    Alcohol use: Yes     Comment: rare special occasions    Drug use: Yes     Types: Marijuana     Comment: every now and then    Sexual activity: Defer     Partners: Female       Family History:   Family History   Problem Relation Age of Onset    Hypertension Mother     Heart attack Father     Heart attack Maternal Uncle     Heart attack Paternal Uncle          REVIEW OF SYSTEMS:    Complete review of systems was reviewed from admission H&P on date of admission, no interval changes.      Vitals:  /70 (BP Location: Right arm, Patient Position: Lying)   Pulse 79   Temp 98.1 °F (36.7 °C) (Oral)   Resp 18   Ht 185.4 cm (73\")   Wt 130 kg (286 lb 8 oz)   SpO2 95%   BMI 37.80 kg/m²   Temp (24hrs), Av.7 °F (36.5 °C), Min:97.2 °F (36.2 °C), Max:98.1 °F (36.7 °C)      I/O (24Hr):  No intake or output data in the 24 hours ending 04/15/25 0705    Labs:  Lab Results (last 72 hours)       Procedure Component Value Units Date/Time    Basic Metabolic Panel [[id number]]  (Abnormal) Collected: 04/15/25 0235    Specimen: Blood Updated: 04/15/25 0353     Glucose 208 mg/dL      BUN 14 mg/dL      Creatinine 1.07 mg/dL      Sodium 137 mmol/L      Potassium 4.1 mmol/L      Chloride 105 mmol/L      CO2 21.0 mmol/L      Calcium 8.8 mg/dL      BUN/Creatinine Ratio 13.1     Anion Gap 11.0 mmol/L      eGFR 84.5 mL/min/1.73     Narrative:      GFR Categories in Chronic Kidney Disease (CKD)      GFR Category          GFR (mL/min/1.73)    Interpretation  G1                     90 or greater         Normal or high (1)  G2                      60-89                Mild decrease (1)  G3a                   45-59                Mild to moderate decrease  G3b                   30-44                Moderate to severe decrease  G4                    15-29                Severe decrease  G5                    14 or " less           Kidney failure          (1)In the absence of evidence of kidney disease, neither GFR category G1 or G2 fulfill the criteria for CKD.    eGFR calculation 2021 CKD-EPI creatinine equation, which does not include race as a factor    CBC & Differential [[id number]]  (Normal) Collected: 04/15/25 0235    Specimen: Blood Updated: 04/15/25 0322    Narrative:      The following orders were created for panel order CBC & Differential.  Procedure                               Abnormality         Status                     ---------                               -----------         ------                     CBC Auto Differential[[id number]]        Normal              Final result                 Please view results for these tests on the individual orders.    CBC Auto Differential [[id number]]  (Normal) Collected: 04/15/25 0235    Specimen: Blood Updated: 04/15/25 0322     WBC 7.36 10*3/mm3      RBC 4.89 10*6/mm3      Hemoglobin 14.4 g/dL      Hematocrit 42.8 %      MCV 87.5 fL      MCH 29.4 pg      MCHC 33.6 g/dL      RDW 13.2 %      RDW-SD 42.0 fl      MPV 11.0 fL      Platelets 282 10*3/mm3      Neutrophil % 58.0 %      Lymphocyte % 31.0 %      Monocyte % 7.7 %      Eosinophil % 1.8 %      Basophil % 1.0 %      Immature Grans % 0.5 %      Neutrophils, Absolute 4.27 10*3/mm3      Lymphocytes, Absolute 2.28 10*3/mm3      Monocytes, Absolute 0.57 10*3/mm3      Eosinophils, Absolute 0.13 10*3/mm3      Basophils, Absolute 0.07 10*3/mm3      Immature Grans, Absolute 0.04 10*3/mm3      nRBC 0.0 /100 WBC     MRSA Screen, PCR (Inpatient) - Swab, Nares [[id number]]  (Abnormal) Collected: 04/14/25 2039    Specimen: Swab from Nares Updated: 04/14/25 2233     MRSA PCR MRSA Detected    Narrative:      The negative predictive value of this diagnostic test is high and should only be used to consider de-escalating anti-MRSA therapy. A positive result may indicate colonization with MRSA and must be correlated clinically.    Lactic  Acid, Plasma [[id number]]  (Abnormal) Collected: 04/14/25 2048    Specimen: Blood Updated: 04/14/25 2135     Lactate 2.1 mmol/L     POC Glucose Once [[id number]]  (Abnormal) Collected: 04/14/25 2028    Specimen: Blood Updated: 04/14/25 2040     Glucose 267 mg/dL      Comment: : [id number] Gissel PhillipMeter ID: [id number]       POC Glucose Once [[id number]]  (Abnormal) Collected: 04/14/25 1737    Specimen: Blood Updated: 04/14/25 1755     Glucose 231 mg/dL      Comment: : [id number] Malagasy DeannaMeter ID: [id number]       Basic Metabolic Panel [[id number]]  (Abnormal) Collected: 04/14/25 1707    Specimen: Blood from Arm, Left Updated: 04/14/25 1734     Glucose 245 mg/dL      BUN 16 mg/dL      Creatinine 1.00 mg/dL      Sodium 136 mmol/L      Potassium 3.8 mmol/L      Chloride 100 mmol/L      CO2 21.0 mmol/L      Calcium 9.3 mg/dL      BUN/Creatinine Ratio 16.0     Anion Gap 15.0 mmol/L      eGFR 91.7 mL/min/1.73     Narrative:      GFR Categories in Chronic Kidney Disease (CKD)      GFR Category          GFR (mL/min/1.73)    Interpretation  G1                     90 or greater         Normal or high (1)  G2                      60-89                Mild decrease (1)  G3a                   45-59                Mild to moderate decrease  G3b                   30-44                Moderate to severe decrease  G4                    15-29                Severe decrease  G5                    14 or less           Kidney failure          (1)In the absence of evidence of kidney disease, neither GFR category G1 or G2 fulfill the criteria for CKD.    eGFR calculation 2021 CKD-EPI creatinine equation, which does not include race as a factor    Comprehensive Metabolic Panel [[id number]]  (Abnormal) Collected: 04/14/25 1452    Specimen: Blood from Arm, Left Updated: 04/14/25 1715     Glucose 331 mg/dL      BUN 17 mg/dL      Creatinine 1.22 mg/dL      Sodium 135 mmol/L      Potassium 4.0 mmol/L      Comment: Slight hemolysis  detected by analyzer. Result may be falsely elevated.        Chloride 97 mmol/L      CO2 18.0 mmol/L      Calcium 9.9 mg/dL      Total Protein 7.4 g/dL      Albumin 4.3 g/dL      ALT (SGPT) 32 U/L      AST (SGOT) 26 U/L      Alkaline Phosphatase 68 U/L      Total Bilirubin 0.3 mg/dL      Globulin 3.1 gm/dL      A/G Ratio 1.4 g/dL      BUN/Creatinine Ratio 13.9     Anion Gap 20.0 mmol/L      eGFR 72.2 mL/min/1.73     Narrative:      GFR Categories in Chronic Kidney Disease (CKD)      GFR Category          GFR (mL/min/1.73)    Interpretation  G1                     90 or greater         Normal or high (1)  G2                      60-89                Mild decrease (1)  G3a                   45-59                Mild to moderate decrease  G3b                   30-44                Moderate to severe decrease  G4                    15-29                Severe decrease  G5                    14 or less           Kidney failure          (1)In the absence of evidence of kidney disease, neither GFR category G1 or G2 fulfill the criteria for CKD.    eGFR calculation 2021 CKD-EPI creatinine equation, which does not include race as a factor    Hemoglobin A1c [[id number]]  (Abnormal) Collected: 04/14/25 1452    Specimen: Blood from Arm, Left Updated: 04/14/25 1714     Hemoglobin A1C 9.30 %     Narrative:      Hemoglobin A1C Ranges:    Increased Risk for Diabetes  5.7% to 6.4%  Diabetes                     >= 6.5%  Diabetic Goal                < 7.0%    CBC & Differential [[id number]]  (Normal) Collected: 04/14/25 1707    Specimen: Blood from Arm, Left Updated: 04/14/25 1713    Narrative:      The following orders were created for panel order CBC & Differential.  Procedure                               Abnormality         Status                     ---------                               -----------         ------                     CBC Auto Differential[[id number]]        Normal              Final result                 Please  view results for these tests on the individual orders.    CBC Auto Differential [[id number]]  (Normal) Collected: 04/14/25 1707    Specimen: Blood from Arm, Left Updated: 04/14/25 1713     WBC 8.17 10*3/mm3      RBC 5.18 10*6/mm3      Hemoglobin 15.1 g/dL      Hematocrit 45.1 %      MCV 87.1 fL      MCH 29.2 pg      MCHC 33.5 g/dL      RDW 13.1 %      RDW-SD 41.8 fl      MPV 10.6 fL      Platelets 276 10*3/mm3      Neutrophil % 56.2 %      Lymphocyte % 33.7 %      Monocyte % 6.9 %      Eosinophil % 1.5 %      Basophil % 1.5 %      Immature Grans % 0.2 %      Neutrophils, Absolute 4.60 10*3/mm3      Lymphocytes, Absolute 2.75 10*3/mm3      Monocytes, Absolute 0.56 10*3/mm3      Eosinophils, Absolute 0.12 10*3/mm3      Basophils, Absolute 0.12 10*3/mm3      Immature Grans, Absolute 0.02 10*3/mm3      nRBC 0.0 /100 WBC     Blood Culture - Blood, Arm, Left [[id number]] Collected: 04/14/25 1544    Specimen: Blood from Arm, Left Updated: 04/14/25 1552    Blood Culture - Blood, Hand, Left [[id number]] Collected: 04/14/25 1452    Specimen: Blood from Hand, Left Updated: 04/14/25 1552    C-reactive Protein [[id number]]  (Normal) Collected: 04/14/25 1452    Specimen: Blood from Arm, Left Updated: 04/14/25 1548     C-Reactive Protein 0.47 mg/dL     CK [[id number]]  (Abnormal) Collected: 04/14/25 1452    Specimen: Blood from Arm, Left Updated: 04/14/25 1547     Creatine Kinase 628 U/L     Sedimentation Rate [[id number]]  (Abnormal) Collected: 04/14/25 1452    Specimen: Blood from Arm, Left Updated: 04/14/25 1539     Sed Rate 28 mm/hr     Lactic Acid, Plasma [[id number]]  (Abnormal) Collected: 04/14/25 1453    Specimen: Blood from Arm, Left Updated: 04/14/25 1518     Lactate 2.8 mmol/L     Basic Metabolic Panel [[id number]]  (Abnormal) Collected: 04/14/25 1452    Specimen: Blood from Arm, Left Updated: 04/14/25 1516     Glucose 327 mg/dL      BUN 16 mg/dL      Creatinine 1.20 mg/dL      Sodium 135 mmol/L      Potassium 4.0 mmol/L       Chloride 97 mmol/L      CO2 21.0 mmol/L      Calcium 9.8 mg/dL      BUN/Creatinine Ratio 13.3     Anion Gap 17.0 mmol/L      eGFR 73.7 mL/min/1.73     Narrative:      GFR Categories in Chronic Kidney Disease (CKD)      GFR Category          GFR (mL/min/1.73)    Interpretation  G1                     90 or greater         Normal or high (1)  G2                      60-89                Mild decrease (1)  G3a                   45-59                Mild to moderate decrease  G3b                   30-44                Moderate to severe decrease  G4                    15-29                Severe decrease  G5                    14 or less           Kidney failure          (1)In the absence of evidence of kidney disease, neither GFR category G1 or G2 fulfill the criteria for CKD.    eGFR calculation 2021 CKD-EPI creatinine equation, which does not include race as a factor    Magnesium [[id number]]  (Normal) Collected: 04/14/25 1452    Specimen: Blood from Arm, Left Updated: 04/14/25 1516     Magnesium 1.9 mg/dL     CBC & Differential [[id number]]  (Normal) Collected: 04/14/25 1452    Specimen: Blood from Arm, Left Updated: 04/14/25 1459    Narrative:      The following orders were created for panel order CBC & Differential.  Procedure                               Abnormality         Status                     ---------                               -----------         ------                     CBC Auto Differential[[id number]]        Normal              Final result                 Please view results for these tests on the individual orders.    CBC Auto Differential [[id number]]  (Normal) Collected: 04/14/25 1452    Specimen: Blood from Arm, Left Updated: 04/14/25 1459     WBC 8.11 10*3/mm3      RBC 5.26 10*6/mm3      Hemoglobin 15.4 g/dL      Hematocrit 45.9 %      MCV 87.3 fL      MCH 29.3 pg      MCHC 33.6 g/dL      RDW 13.1 %      RDW-SD 41.1 fl      MPV 10.8 fL      Platelets 282 10*3/mm3      Neutrophil % 61.2 %       Lymphocyte % 29.0 %      Monocyte % 7.0 %      Eosinophil % 1.2 %      Basophil % 1.2 %      Immature Grans % 0.4 %      Neutrophils, Absolute 4.96 10*3/mm3      Lymphocytes, Absolute 2.35 10*3/mm3      Monocytes, Absolute 0.57 10*3/mm3      Eosinophils, Absolute 0.10 10*3/mm3      Basophils, Absolute 0.10 10*3/mm3      Immature Grans, Absolute 0.03 10*3/mm3      nRBC 0.0 /100 WBC             Radiology:  Imaging Results (Last 72 Hours)       Procedure Component Value Units Date/Time    XR Hand 3+ View Right [[id number]] Collected: 04/14/25 1522     Updated: 04/14/25 1529    Narrative:      EXAMINATION: XR HAND 3+ VW RIGHT-  4/14/2025 3:22 PM     HISTORY: Index finger infection     COMPARISON: None      TECHNIQUE:  Frontal, lateral and oblique radiographs of the RIGHT hand were provided  for review.     FINDINGS:  Focal soft tissue swelling over the radial aspect of the distal second  phalanx with subtle lucency in the radial margin of the distal tuft of  the second distal phalanx. I am suspicious this may represent some  cortical erosion and osteomyelitis. Old healed second metacarpal  fracture noted. On the lateral view of the second digit, there is  cortical disruption along the palmar aspect of the distal phalanx. This  could potentially represent a focal fracture.          Impression:         1.  Abnormal appearance of the second distal phalanx with some cortical  bone loss along the radial margin of the second distal phalanx near the  area of soft tissue swelling. In addition there is likely a small  fracture of the distal second phalanx as well. This may represent  sequelae of osteomyelitis and a secondary fracture of the distal second  phalanx.         This report was signed and finalized on 4/14/2025 3:26 PM by Dr. Ramses Bocanegra MD.               Physical Exam:     PHYSICAL EXAM:      Physical Examination:  Vitals:   Vitals:    04/14/25 1736 04/14/25 1944 04/14/25 2326 04/15/25 0425   BP: 113/76 112/70  123/82 128/70   BP Location: Left arm Left arm Left arm Right arm   Patient Position: Sitting Lying Lying Lying   Pulse: 75 73 79 79   Resp: 16 16 18 18   Temp: 97.4 °F (36.3 °C) 97.8 °F (36.6 °C) 97.8 °F (36.6 °C) 98.1 °F (36.7 °C)   TempSrc: Oral Oral Oral Oral   SpO2: 100% 96% 97% 95%   Weight:       Height:         General:  Appears stated age, no distress.  Orientation:  Alert and oriented to time, place, and person.  HEENT: Head is normocephalic, atraumatic.  No gross visual or auditory acuity deficits.  Mood and Affect:  Cooperative and pleasant.  Gait:  Resting comfortably in bed.  Cardiovascular:  Symmetric 1-2 plus pulses in upper and lower extremities.  Sensation:  Grossly intact to light touch.  Coordination/balance:  Normal  Musculoskeletal:  Right upper extremity exam: On inspection, purulent collection along the radial paronychial and eponychial skin folds of the right index finger with associated surrounding edema and erythema.  Subcutaneous purulence extends palmarly to involve the radial half of the hyponychium.  Nail plate is intact.  Callus noted involving most digits of the right hand.  No active bleeding or drainage from the right index finger.  Significant tenderness to palpation about the right index distal phalanx, less significant but moderate tenderness to right index middle and proximal phalanges.  No significant tenderness extending into the palm of the right hand.  No tenderness to the remaining digits.  Significantly limited active and passive motion of the right index finger due to pain.  No significant discomfort with digital motion of uninvolved digits.  No pain with active wrist range of motion.  No gross neurovascular deficit to the right hand.      Radiology Review  My review of patient's x-rays shows the patient to have significant soft tissue edema about right index finger distal phalanx, no clear bony resorption noted.  Healed second metacarpal shaft fracture with malunion.   No other acute fracture or dislocation is noted.    Assessment:     48 y/o RHD M with R index finger paronychial/eponychial abscess, poorly controlled type 2 diabetes mellitus-recent hemoglobin A1c 9.3, significant medical history for hypertension, coronary artery disease status post stenting, hyperlipidemia, history of prior MRSA infection, history of tobacco use, obesity    Plan:   Right index finger: Reviewed clinical findings with he and significant other who was present this morning.  Recommending MRI to assess for potential deeper infection and more proximal involvement.  Will plan for incision and drainage of right index finger tomorrow.  Recommended tight blood sugar control. Ok for diet today from ortho perspective, NPO at midnight.  2. Activity: Up ad tab, elevate right upper extremity  3. Pain control  4. ID: Continue IV abx

## 2025-04-15 NOTE — PLAN OF CARE
Goal Outcome Evaluation:  Plan of Care Reviewed With: patient      Patient A&O x4. Patient c/o pain in right index finger, PRN pain med given. No c/o nausea. Patient on room air. Continuous pulse ox in use. Repositioning independently. IVF infusing and IV antibiotics given per order. Tele on, sinus rhythm. NPO since midnight.

## 2025-04-15 NOTE — CONSULTS
"INFECTIOUS DISEASES CONSULT NOTE    Patient:  James Mar 49 y.o. male  ROOM # 374/1  YOB: 1975  MRN: [id number]  [facility]:  [id number]  Admit date: 4/14/2025   Admitting Physician: Ashanti Whatley MD  Primary Care Physician: Radha Mortensen APRN  REFERRING PROVIDER: No ref. provider found    Reason for Consultation: \"Right hand osteo\" pleasant 49-year-old man.  He indicates symptoms started a little more than a week ago.    History of Present Illness/Chief Complaint: He noticed some swelling involving the distal aspect of the right index finger.  He indicates symptoms progressed.  It was some uncomfortable or somewhat uncomfortable and sore to touch.  He cannot recall any penetrating trauma or crush injury.  He does work cutting trees.  He indicates at first he thought he may have a splinter or a metal fragment,  But he could not remember any specific injury.  He indicates he went to [place] [facility] initially.  He was evaluated and released.  He indicates a day or 2 later he went to urgent care/[facility] in [place].  He started Bactrim.  He does not report significant change.  Due to ongoing symptoms he returned and then was referred to [facility] and was admitted for further management.  He indicates he last received his tetanus booster about 5 years ago.  He indicates history of diabetes.  Reports no other significant past medical history.  Fever has not been a prominent symptom.  He does smoke tobacco.  He has not had other symptoms other than the right index finger.      Current Scheduled Medications:   insulin lispro, 4-24 Units, Subcutaneous, 4x Daily AC & at Bedtime  piperacillin-tazobactam, 3.375 g, Intravenous, Q8H  sodium chloride, 10 mL, Intravenous, Q12H  vancomycin, 1,000 mg, Intravenous, Q12H      Current PRN Medications:    acetaminophen **OR** acetaminophen **OR** acetaminophen    senna-docusate sodium **AND** polyethylene glycol **AND** bisacodyl **AND** bisacodyl    Calcium " "Replacement - Follow Nurse / BPA Driven Protocol    dextrose    dextrose    glucagon (human recombinant)    Magnesium Standard Dose Replacement - Follow Nurse / BPA Driven Protocol    Morphine **AND** naloxone    ondansetron    oxyCODONE-acetaminophen    Phosphorus Replacement - Follow Nurse / BPA Driven Protocol    Potassium Replacement - Follow Nurse / BPA Driven Protocol    [COMPLETED] Insert Peripheral IV **AND** sodium chloride    sodium chloride    sodium chloride    Allergies:  No Known Allergies    Past Medical History:   Diabetes mellitus.  Hypertension.  Tobacco use.  History DVT.    Past Surgical History: Laparoscopic cholecystectomy.  Appendectomy.  Coronary artery stenting.  Varicose vein surgery.      Social History: Smokes 1 pack/day.  No alcohol use.  No illicit drug use.  Works cutting down and hauling off trees.    Family History: Heart disease.  Hypertension.    Exposure History: No close contacts have been ill no nausea, diarrhea, rash, or skin itching    Review of Systems     Vital Signs:  /70 (BP Location: Right arm, Patient Position: Lying)   Pulse 79   Temp 98.1 °F (36.7 °C) (Oral)   Resp 18   Ht 185.4 cm (73\")   Wt 130 kg (286 lb 8 oz)   SpO2 95%   BMI 37.80 kg/m²  Temp (24hrs), Av.7 °F (36.5 °C), Min:97.2 °F (36.2 °C), Max:98.1 °F (36.7 °C)    Physical Exam  Vital signs - reviewed.  Line/IV site - No erythema or tenderness.  Lungs without crackles  Heart without murmur  Abdomen soft and nontender  There is swelling and discoloration distal aspect of the right index finger  No drainage currently  Skin on hands somewhat dirty in appearance      Lab Results:  CBC:   Results from last 7 days   Lab Units 04/15/25  02325  17025  1452   WBC 10*3/mm3 7.36 8.17 8.11   HEMOGLOBIN g/dL 14.4 15.1 15.4   HEMATOCRIT % 42.8 45.1 45.9   PLATELETS 10*3/mm3 282 276 282     CMP:   Results from last 7 days   Lab Units 04/15/25  0235 04/14/25  17025  1452   SODIUM mmol/L " 137 136 135*  135*   POTASSIUM mmol/L 4.1 3.8 4.0  4.0   CHLORIDE mmol/L 105 100 97*  97*   CO2 mmol/L 21.0* 21.0* 18.0*  21.0*   BUN mg/dL 14 16 17  16   CREATININE mg/dL 1.07 1.00 1.22  1.20   CALCIUM mg/dL 8.8 9.3 9.9  9.8   BILIRUBIN mg/dL  --   --  0.3   ALK PHOS U/L  --   --  68   ALT (SGPT) U/L  --   --  32   AST (SGOT) U/L  --   --  26   GLUCOSE mg/dL 208* 245* 331*  327*     Radiology:     X-rays right hand:  IMPRESSION:   1.  Abnormal appearance of the second distal phalanx with some cortical  bone loss along the radial margin of the second distal phalanx near the  area of soft tissue swelling. In addition there is likely a small  fracture of the distal second phalanx as well. This may represent  sequelae of osteomyelitis and a secondary fracture of the distal second  phalanx.     Additional Studies Reviewed:     Impression:   Swelling discoloration distal aspect of right index finger.  He may have had paronychia with infection that has progressed.  He may have had some unrecalled trauma to the distal finger with bruising/swelling and some secondary infection.  There is some evidence of possible small fracture distal aspect of the right second finger. He works cutting and hauling off trees.  Penetrating injury/foreign body remains in the differential.    Recommendations:    He appears to be  up-to-date on his tetanus per history having had this within the past 5 years  Continue broad antibiotic treatment with vancomycin and Zosyn  Clean hands with warm soap and water  Continue broad empiric antibiotic treatment  Agree with operative exploration/irrigation/debridement  Would recommend aerobic and anaerobic tissue culture, AFB culture and fungal culture  Monitor clinical course    Jimmy Liang MD  04/15/25  07:32 CDT

## 2025-04-15 NOTE — PLAN OF CARE
Goal Outcome Evaluation:  Plan of Care Reviewed With: patient        Progress: no change  Outcome Evaluation: Pt c/o pain in left index finger, noted redness and swelling at tip; IVF and IV abx per orders; up ad tab; voiding; Dr. Sahni and Dr. Kennedy consulted; MRI TBD; NPO after MN for potential procedure with Dr. Sahni; safety maintained.

## 2025-04-15 NOTE — PROGRESS NOTES
[facility] Medicine Services  INPATIENT PROGRESS NOTE    Patient Name: James Mar  Date of Admission: 4/14/2025  Today's Date: 04/15/25  Length of Stay: 1  Primary Care Physician: Radha Mortensen APRN    Subjective   Primary Historian: patient      Chief Complaint: R hand erythema edema drainage      Wound Infection  Abscess     49-year-old male with PMH of morbid obesity ? MARYBEL. DM  , HTN , CAD s/p stenting who states that he has an infection of his right index finger. He states that has been going on for about a week and getting worse. He states that he is not able to bend his finger anymore. He states that the swelling and redness is getting worse. He is on Bactrim. But no improvement of his symptoms. He has tried to drain it himself with no significant amount of drainage. Patient denies any other symptoms at this time   In ED wbc -ve, R hand xray ? Osteo 2nd distal phalanx, AG elevated ED did not feel the patient is in DKA, BC obtained started on vanco, will adm,it continue zosyn, vanco, SSI high, IVF check A1c, consult ID, ortho, scds for DVT prophylaxis, identify reconcile restart home meds once reconciled  4/15  For history of morbid obesity questionable sleep apnea history of diabetes hypertension coronary artery disease status post stenting A1c is 10.7 presented with the worsening of right hand infection questionable osteomyelitis of the second distal phalanx placed on vancomycin and Zosyn ID consulted and so was orthopedic surgery Recommending MRI to assess for potential deeper infection and more proximal involvement.  Will plan for incision and drainage of right index finger tomorrow.  Was having bradycardia asymptomatic at night during sleep strongly suspect sleep apnea check his TSH MRSA screen was positive  Blood cultures remain pending his anion gap has closed we did not need any DKA protocol and his blood sugar is much better educated that he needs to be  "started on insulin    Review of Systems   All other systems reviewed and are negative.     All pertinent negatives and positives are as above. All other systems have been reviewed and are negative unless otherwise stated.     Objective    Temp:  [97.2 °F (36.2 °C)-98.1 °F (36.7 °C)] 98 °F (36.7 °C)  Heart Rate:  [73-79] 76  Resp:  [16-18] 16  BP: (112-134)/(70-86) 120/86  Physical Exam  HENT:      Head: Normocephalic.      Nose: Nose normal.   Eyes:      Pupils: Pupils are equal, round, and reactive to light.   Cardiovascular:      Rate and Rhythm: Normal rate.   Pulmonary:      Breath sounds: Wheezing present.   Abdominal:      General: Abdomen is flat.   Musculoskeletal:      Cervical back: Normal range of motion.      Comments: Right hand cellulitis erythema edema   Neurological:      Mental Status: He is alert.             Results Review:  I have reviewed the labs, radiology results, and diagnostic studies.    Laboratory Data:   Results from last 7 days   Lab Units 04/15/25  0235 04/14/25  1707 04/14/25  1452   WBC 10*3/mm3 7.36 8.17 8.11   HEMOGLOBIN g/dL 14.4 15.1 15.4   HEMATOCRIT % 42.8 45.1 45.9   PLATELETS 10*3/mm3 282 276 282        Results from last 7 days   Lab Units 04/15/25  0235 04/14/25  1707 04/14/25  1452   SODIUM mmol/L 137 136 135*  135*   POTASSIUM mmol/L 4.1 3.8 4.0  4.0   CHLORIDE mmol/L 105 100 97*  97*   CO2 mmol/L 21.0* 21.0* 18.0*  21.0*   BUN mg/dL 14 16 17  16   CREATININE mg/dL 1.07 1.00 1.22  1.20   CALCIUM mg/dL 8.8 9.3 9.9  9.8   BILIRUBIN mg/dL  --   --  0.3   ALK PHOS U/L  --   --  68   ALT (SGPT) U/L  --   --  32   AST (SGOT) U/L  --   --  26   GLUCOSE mg/dL 208* 245* 331*  327*       Culture Data:   No results found for: \"BLOODCX\", \"URINECX\", \"WOUNDCX\", \"MRSACX\", \"RESPCX\", \"STOOLCX\"    Radiology Data:   Imaging Results (Last 24 Hours)       Procedure Component Value Units Date/Time    XR Hand 3+ View Right [[id number]] Collected: 04/14/25 1522     Updated: 04/14/25 1529 "    Narrative:      EXAMINATION: XR HAND 3+ VW RIGHT-  4/14/2025 3:22 PM     HISTORY: Index finger infection     COMPARISON: None      TECHNIQUE:  Frontal, lateral and oblique radiographs of the RIGHT hand were provided  for review.     FINDINGS:  Focal soft tissue swelling over the radial aspect of the distal second  phalanx with subtle lucency in the radial margin of the distal tuft of  the second distal phalanx. I am suspicious this may represent some  cortical erosion and osteomyelitis. Old healed second metacarpal  fracture noted. On the lateral view of the second digit, there is  cortical disruption along the palmar aspect of the distal phalanx. This  could potentially represent a focal fracture.          Impression:         1.  Abnormal appearance of the second distal phalanx with some cortical  bone loss along the radial margin of the second distal phalanx near the  area of soft tissue swelling. In addition there is likely a small  fracture of the distal second phalanx as well. This may represent  sequelae of osteomyelitis and a secondary fracture of the distal second  phalanx.         This report was signed and finalized on 4/14/2025 3:26 PM by Dr. Ramses Bocanegra MD.               I have reviewed the patient's current medications.     Assessment/Plan   Assessment  Active Hospital Problems    Diagnosis     **Osteomyelitis        Treatment Plan  PMH of morbid obesity ? MARYBEL. DM  , HTN who states that he has an infection of his right index finger. He states that has been going on for about a week and getting worse. He states that he is not able to bend his finger anymore. He states that the swelling and redness is getting worse. He is on Bactrim. But no improvement of his symptoms. He has tried to drain it himself with no significant amount of drainage. Patient denies any other symptoms at this time   In ED wbc -ve, R hand xray ? Osteo 2nd distal phalanx, AG elevated ED did not feel the patient is in DKA, BC  obtained started on vanco, will adm,it continue zosyn, vanco, SSI high, IVF check A1c, consult ID, ortho, scds for DVT prophylaxis, identify reconcile restart home meds once reconciled tristen Nelson.      Medical Decision Making  Number and Complexity of problems: 3 acute   Differential Diagnosis: as above      Conditions and Status        Condition is at treatment goal.     MDM Data  External documents reviewed: yes  Cardiac tracing (EKG, telemetry) interpretation: yes  Radiology interpretation: yes  Labs reviewed: yes  Any tests that were considered but not ordered: no     Decision rules/scores evaluated (example [id number], Wells, etc): no     Discussed with: ED     Care Planning  Shared decision making: Patient apprised of current labs, vitals, imaging and treatment plan.  They are agreeable with proceeding with plans as discussed.   Code status and discussions: full      Disposition  Social Determinants of Health that impact treatment or disposition: no  Estimated length of stay is TBD.      I confirmed that the patient's advanced care plan is present, code status is documented, and a surrogate decision maker is listed in the patient's medical record        Electronically signed by Ashanti Whatley MD, 04/15/25, 09:50 CDT.

## 2025-04-15 NOTE — PLAN OF CARE
Problem: Adult Inpatient Plan of Care  Goal: Plan of Care Review  Outcome: Progressing  Flowsheets (Taken 4/15/2025 8576)  Outcome Evaluation: Patient RA. IV CDI, IID. IV abx infusing per order. Right index finger redness and swelling at tip. C/O pain, see MAR. Up adlib. MRI, see results. VSS, safety maintained.

## 2025-04-15 NOTE — PAYOR COMM NOTE
"Saida Dumont (49 y.o. Male)      [id number]   NEW INPT REQUEST      Admit 4/14    [facility]   npi  [id number]        Dr Ashanti Whatley   NPI  [id number]   ICD 10 Code   M86.9 osteomyelitis           [facility]    fax     [phone]         Date of Birth   1975    Social Security Number       Address   [address] [place] [id number]    Home Phone   [phone]    MRN   [id number]       [facility]    Marital Status                               Admission Date   4/14/2025    Admission Type   Emergency    Admitting Provider   Ashanti Whatley MD    Attending Provider   Ashanti Whatley MD    Department, Room/Bed   [facility] 3C, 374/1       Discharge Date       Discharge Disposition       Discharge Destination                                 Attending Provider: Ashanti Whatley MD    Allergies: No Known Allergies    Isolation: Contact   Infection: MRSA (04/14/25)   Code Status: CPR    Ht: 185.4 cm (73\")   Wt: 130 kg (286 lb 8 oz)    Admission Cmt: None   Principal Problem: Osteomyelitis [M86.9]                   Active Insurance as of 4/14/2025       Primary Coverage       Payor Plan Insurance Group Employer/Plan Group    [employer] [place] [employer] [place]ICA[place]        Payor Plan Address Payor Plan Phone Number Payor Plan Fax Number Effective Dates    [address] [address] [phone]  1/11/2024 - None Entered    [place] [address]         Subscriber Name Subscriber Birth Date Member ID       SAIDA DUMONT 1975 [id number]                     Emergency Contacts        (Rel.) Home Phone Work Phone Mobile Phone    Devi Dumont (Spouse) [phone] -- --                 History & Physical        Ashanti Whatley MD at 04/14/25 [facility]              [facility] Medicine Services  HISTORY AND PHYSICAL    Date of Admission: 4/14/2025  Primary Care Physician: Radha Mortensen APRN    Subjective "   Primary Historian: patient     Chief Complaint: R hand erythema edema drainage     Wound Infection  Abscess    49-year-old male with PMH of morbid obesity ? MARYBEL. DM  , HTN , CAD s/p stenting who states that he has an infection of his right index finger. He states that has been going on for about a week and getting worse. He states that he is not able to bend his finger anymore. He states that the swelling and redness is getting worse. He is on Bactrim. But no improvement of his symptoms. He has tried to drain it himself with no significant amount of drainage. Patient denies any other symptoms at this time   In ED wbc -ve, R hand xray ? Osteo 2nd distal phalanx, AG elevated ED did not feel the patient is in DKA, BC obtained started on vanco, will adm,it continue zosyn, vanco, SSI high, IVF check A1c, consult ID, ortho, scds for DVT prophylaxis, identify reconcile restart home meds once reconciled         Review of Systems   Otherwise complete ROS reviewed and negative except as mentioned in the HPI.    Past Medical History:   Past Medical History:   Diagnosis Date    Cellulitis and abscess of left leg     Class 1 obesity     Diabetes mellitus     DVT (deep venous thrombosis)     Hyperlipidemia     Hypertension     MRSA infection     Tobacco abuse      Past Surgical History:  Past Surgical History:   Procedure Laterality Date    APPENDECTOMY      CORONARY STENT PLACEMENT  2023    [facility]    LAPAROSCOPIC CHOLECYSTECTOMY      VARICOSE VEIN SURGERY Left 4/19/2024    Procedure: LEFT LOWER EXTREMITY RADIOFREQUENCY ABLATION;  Surgeon: Theodore Ch DO;  Location: [facility] HYBRID OR;  Service: Vascular;  Laterality: Left;     Social History:  reports that he has been smoking cigarettes. He has been exposed to tobacco smoke. He has never used smokeless tobacco. He reports current alcohol use. He reports current drug use. Drug: Marijuana.    Family History: family history includes Heart attack in his father, maternal  uncle, and paternal uncle; Hypertension in his mother.       Allergies:  No Known Allergies    Medications:  Prior to Admission medications    Medication Sig Start Date End Date Taking? Authorizing Provider   albuterol sulfate  (90 Base) MCG/ACT inhaler Inhale 2 puffs Every 4 (Four) Hours As Needed for Wheezing.   Yes Triston Christian MD   aspirin 81 MG EC tablet Take 1 tablet by mouth Daily.   Yes Triston Christian MD   atorvastatin (LIPITOR) 80 MG tablet Take 1 tablet by mouth Daily.   Yes Triston Christian MD   glipizide (GLUCOTROL) 10 MG tablet Take 1 tablet by mouth 2 (Two) Times a Day Before Meals.   Yes Triston Christian MD   metFORMIN (GLUCOPHAGE) 500 MG tablet Take 1 tablet by mouth 2 (Two) Times a Day With Meals.   Yes Triston Christian MD   metoprolol succinate XL (TOPROL-XL) 50 MG 24 hr tablet Take 1 tablet by mouth Daily.   Yes Triston Christian MD   prasugrel (EFFIENT) 10 MG tablet Take 1 tablet by mouth Daily.   Yes Triston Christian MD   SEMAGLUTIDE, 1 MG/DOSE, SC Inject 0.5 mg under the skin into the appropriate area as directed 1 (One) Time Per Week. Sunday   Yes Triston Christian MD   AMOXICILLIN PO Take 1 tablet by mouth 2 (Two) Times a Day. FOR INFECTION IN NOSE  4/14/25  Triston Christian MD   furosemide (LASIX) 20 MG tablet Take 1 tablet by mouth 2 (Two) Times a Day As Needed.  4/14/25  Triston Christian MD   HYDROcodone-acetaminophen (NORCO) 5-325 MG per tablet Take 1 tablet by mouth Every 6 (Six) Hours As Needed (Pain). 4/19/24 4/14/25  Theodore Ch,    losartan (COZAAR) 50 MG tablet Take 1 tablet by mouth Daily.  4/14/25  Triston Christian MD   ofloxacin (OCUFLOX) 0.3 % ophthalmic solution Administer 1 drop to both eyes 4 (Four) Times a Day As Needed. 12/18/23 4/14/25  Triston Christian MD   sodium hypochlorite (DAKIN'S 1/4 STRENGTH) 0.125 % solution topical solution 0.125% Apply 1 mL topically to the appropriate area as  "directed 2 (Two) Times a Day. 1/25/24 4/14/25  ProviderTriston MD   varenicline (CHANTIX) 1 MG tablet Take 1 tablet by mouth 2 (Two) Times a Day.  4/14/25  ProviderTriston MD     I have utilized all available immediate resources to obtain, update, or review the patient's current medications (including all prescriptions, over-the-counter products, herbals, cannabis/cannabidiol products, and vitamin/mineral/dietary (nutritional) supplements).    Objective     Vital Signs: /79   Pulse 77   Temp 97.2 °F (36.2 °C) (Temporal)   Resp 18   Ht 185.4 cm (73\")   Wt 130 kg (286 lb 8 oz)   SpO2 97%   BMI 37.80 kg/m²   Physical Exam  Constitutional:       Appearance: He is obese.   HENT:      Head: Normocephalic.      Nose: Nose normal.   Cardiovascular:      Rate and Rhythm: Normal rate.   Pulmonary:      Breath sounds: Wheezing present.   Abdominal:      General: Bowel sounds are normal.   Musculoskeletal:      Cervical back: Normal range of motion.      Comments: R hand erythema edema drainage    Neurological:      General: No focal deficit present.              Results Reviewed:  Lab Results (last 24 hours)       Procedure Component Value Units Date/Time    Comprehensive Metabolic Panel [[id number]] Collected: 04/14/25 1452    Specimen: Blood from Arm, Left Updated: 04/14/25 1701    Hemoglobin A1c [[id number]] Collected: 04/14/25 1452    Specimen: Blood from Arm, Left Updated: 04/14/25 1659    Blood Culture - Blood, Arm, Left [[id number]] Collected: 04/14/25 1544    Specimen: Blood from Arm, Left Updated: 04/14/25 1552    Blood Culture - Blood, Hand, Left [[id number]] Collected: 04/14/25 1452    Specimen: Blood from Hand, Left Updated: 04/14/25 1552    C-reactive Protein [[id number]]  (Normal) Collected: 04/14/25 1452    Specimen: Blood from Arm, Left Updated: 04/14/25 1548     C-Reactive Protein 0.47 mg/dL     CK [[id number]]  (Abnormal) Collected: 04/14/25 1452    Specimen: Blood from Arm, Left Updated: " 04/14/25 1547     Creatine Kinase 628 U/L     Sedimentation Rate [[id number]]  (Abnormal) Collected: 04/14/25 1452    Specimen: Blood from Arm, Left Updated: 04/14/25 1539     Sed Rate 28 mm/hr     Lactic Acid, Plasma [[id number]]  (Abnormal) Collected: 04/14/25 1453    Specimen: Blood from Arm, Left Updated: 04/14/25 1518     Lactate 2.8 mmol/L     Basic Metabolic Panel [[id number]]  (Abnormal) Collected: 04/14/25 1452    Specimen: Blood from Arm, Left Updated: 04/14/25 1516     Glucose 327 mg/dL      BUN 16 mg/dL      Creatinine 1.20 mg/dL      Sodium 135 mmol/L      Potassium 4.0 mmol/L      Chloride 97 mmol/L      CO2 21.0 mmol/L      Calcium 9.8 mg/dL      BUN/Creatinine Ratio 13.3     Anion Gap 17.0 mmol/L      eGFR 73.7 mL/min/1.73     Narrative:      GFR Categories in Chronic Kidney Disease (CKD)      GFR Category          GFR (mL/min/1.73)    Interpretation  G1                     90 or greater         Normal or high (1)  G2                      60-89                Mild decrease (1)  G3a                   45-59                Mild to moderate decrease  G3b                   30-44                Moderate to severe decrease  G4                    15-29                Severe decrease  G5                    14 or less           Kidney failure          (1)In the absence of evidence of kidney disease, neither GFR category G1 or G2 fulfill the criteria for CKD.    eGFR calculation 2021 CKD-EPI creatinine equation, which does not include race as a factor    Magnesium [[id number]]  (Normal) Collected: 04/14/25 1452    Specimen: Blood from Arm, Left Updated: 04/14/25 1516     Magnesium 1.9 mg/dL     CBC & Differential [[id number]]  (Normal) Collected: 04/14/25 1452    Specimen: Blood from Arm, Left Updated: 04/14/25 1459    Narrative:      The following orders were created for panel order CBC & Differential.  Procedure                               Abnormality         Status                     ---------                                -----------         ------                     CBC Auto Differential[[id number]]        Normal              Final result                 Please view results for these tests on the individual orders.    CBC Auto Differential [[id number]]  (Normal) Collected: 04/14/25 1452    Specimen: Blood from Arm, Left Updated: 04/14/25 1459     WBC 8.11 10*3/mm3      RBC 5.26 10*6/mm3      Hemoglobin 15.4 g/dL      Hematocrit 45.9 %      MCV 87.3 fL      MCH 29.3 pg      MCHC 33.6 g/dL      RDW 13.1 %      RDW-SD 41.1 fl      MPV 10.8 fL      Platelets 282 10*3/mm3      Neutrophil % 61.2 %      Lymphocyte % 29.0 %      Monocyte % 7.0 %      Eosinophil % 1.2 %      Basophil % 1.2 %      Immature Grans % 0.4 %      Neutrophils, Absolute 4.96 10*3/mm3      Lymphocytes, Absolute 2.35 10*3/mm3      Monocytes, Absolute 0.57 10*3/mm3      Eosinophils, Absolute 0.10 10*3/mm3      Basophils, Absolute 0.10 10*3/mm3      Immature Grans, Absolute 0.03 10*3/mm3      nRBC 0.0 /100 WBC           Imaging Results (Last 24 Hours)       Procedure Component Value Units Date/Time    XR Hand 3+ View Right [[id number]] Collected: 04/14/25 1522     Updated: 04/14/25 1529    Narrative:      EXAMINATION: XR HAND 3+ VW RIGHT-  4/14/2025 3:22 PM     HISTORY: Index finger infection     COMPARISON: None      TECHNIQUE:  Frontal, lateral and oblique radiographs of the RIGHT hand were provided  for review.     FINDINGS:  Focal soft tissue swelling over the radial aspect of the distal second  phalanx with subtle lucency in the radial margin of the distal tuft of  the second distal phalanx. I am suspicious this may represent some  cortical erosion and osteomyelitis. Old healed second metacarpal  fracture noted. On the lateral view of the second digit, there is  cortical disruption along the palmar aspect of the distal phalanx. This  could potentially represent a focal fracture.          Impression:         1.  Abnormal appearance of the second distal  phalanx with some cortical  bone loss along the radial margin of the second distal phalanx near the  area of soft tissue swelling. In addition there is likely a small  fracture of the distal second phalanx as well. This may represent  sequelae of osteomyelitis and a secondary fracture of the distal second  phalanx.         This report was signed and finalized on 4/14/2025 3:26 PM by Dr. Ramses Bocanegra MD.             I have personally reviewed and interpreted the radiology studies and ECG obtained at time of admission.     Assessment / Plan   Assessment:   Active Hospital Problems    Diagnosis     **Osteomyelitis    Impedeing DKA      Treatment Plan  The patient will be admitted to my service here at [facility] H49-year-old male with PMH of morbid obesity ? MARYBEL. DM  , HTN who states that he has an infection of his right index finger. He states that has been going on for about a week and getting worse. He states that he is not able to bend his finger anymore. He states that the swelling and redness is getting worse. He is on Bactrim. But no improvement of his symptoms. He has tried to drain it himself with no significant amount of drainage. Patient denies any other symptoms at this time   In ED wbc -ve, R hand xray ? Osteo 2nd distal phalanx, AG elevated ED did not feel the patient is in DKA, BC obtained started on vanco, will adm,it continue zosyn, vanco, SSI high, IVF check A1c, consult ID, ortho, scds for DVT prophylaxis, identify reconcile restart home meds once reconciled tristen Nelson.     Medical Decision Making  Number and Complexity of problems: 3 acute   Differential Diagnosis: as above     Conditions and Status        Condition is at treatment goal.     [facility] Data  External documents reviewed: yes  Cardiac tracing (EKG, telemetry) interpretation: yes  Radiology interpretation: yes  Labs reviewed: yes  Any tests that were considered but not ordered: no     Decision rules/scores evaluated (example [id number],  Wells, etc): no     Discussed with: ED     Care Planning  Shared decision making: Patient apprised of current labs, vitals, imaging and treatment plan.  They are agreeable with proceeding with plans as discussed.   Code status and discussions: full     Disposition  Social Determinants of Health that impact treatment or disposition: no  Estimated length of stay is TBD.     I confirmed that the patient's advanced care plan is present, code status is documented, and a surrogate decision maker is listed in the patient's medical record.         The patient was seen and examined by me on 1710 at [facility] .    Electronically signed by Ashanti Whatley MD, 04/14/25, 17:04 CDT.              Electronically signed by Ashanti Whatley MD at 04/14/25 1801          Emergency Department Notes        Natalia Corral RN at 04/14/25 1714          Report called, pt placed in transport    Electronically signed by Natalia Corral RN at 04/14/25 1714       Natalia Corral RN at 04/14/25 1635          Pt ambulating to bathroom at this time    Electronically signed by Natalia Corral RN at 04/14/25 1635       Fabian Galo MD at 04/14/25 4167          Subjective   History of Present Illness  49-year-old male states that he has an infection of his right index finger.  He states that has been going on for about a week and getting worse.  He states that he is not able to bend his finger anymore.  He states that the swelling and redness is getting worse.  He is on Bactrim.  But no improvement of his symptoms.  He has tried to drain it himself with no significant amount of drainage.  Patient denies any other symptoms at this time.      Review of Systems   All other systems reviewed and are negative.      Past Medical History:   Diagnosis Date    Cellulitis and abscess of left leg     Class 1 obesity     Diabetes mellitus     DVT (deep venous thrombosis)     Hyperlipidemia     Hypertension     MRSA infection     Tobacco abuse         No Known Allergies    Past Surgical History:   Procedure Laterality Date    APPENDECTOMY      CORONARY STENT PLACEMENT  2023    [facility]    LAPAROSCOPIC CHOLECYSTECTOMY      VARICOSE VEIN SURGERY Left 4/19/2024    Procedure: LEFT LOWER EXTREMITY RADIOFREQUENCY ABLATION;  Surgeon: Theodore Ch DO;  Location:  [facility] HYBRID OR;  Service: Vascular;  Laterality: Left;       Family History   Problem Relation Age of Onset    Hypertension Mother     Heart attack Father     Heart attack Maternal Uncle     Heart attack Paternal Uncle        Social History     Socioeconomic History    Marital status:      Spouse name: Mrs. Patrick Mar    Number of children: 3   Tobacco Use    Smoking status: Every Day     Current packs/day: 1.00     Types: Cigarettes     Passive exposure: Past    Smokeless tobacco: Never   Vaping Use    Vaping status: Never Used   Substance and Sexual Activity    Alcohol use: Yes     Comment: rare special occasions    Drug use: Yes     Types: Marijuana     Comment: every now and then    Sexual activity: Defer     Partners: Female           Objective   Physical Exam  Vitals and nursing note reviewed.   Constitutional:       Appearance: Normal appearance.   HENT:      Head: Normocephalic and atraumatic.      Mouth/Throat:      Mouth: Mucous membranes are moist.   Eyes:      Extraocular Movements: Extraocular movements intact.      Pupils: Pupils are equal, round, and reactive to light.   Cardiovascular:      Rate and Rhythm: Normal rate and regular rhythm.      Heart sounds: Normal heart sounds.   Pulmonary:      Effort: Pulmonary effort is normal.      Breath sounds: Normal breath sounds.   Musculoskeletal:      Comments: Swollen erythematous right index finger.  Tender to touch.  Range of motion limited.   Skin:     General: Skin is warm and dry.   Neurological:      General: No focal deficit present.      Mental Status: He is alert and oriented to person, place, and time.   Psychiatric:          Mood and Affect: Mood normal.         Behavior: Behavior normal.         Procedures          ED Course                                                     Lab Results (last 24 hours)       Procedure Component Value Units Date/Time    CBC & Differential [[id number]]  (Normal) Collected: 04/14/25 1452    Specimen: Blood from Arm, Left Updated: 04/14/25 1459    Narrative:      The following orders were created for panel order CBC & Differential.  Procedure                               Abnormality         Status                     ---------                               -----------         ------                     CBC Auto Differential[[id number]]        Normal              Final result                 Please view results for these tests on the individual orders.    Basic Metabolic Panel [[id number]]  (Abnormal) Collected: 04/14/25 1452    Specimen: Blood from Arm, Left Updated: 04/14/25 1516     Glucose 327 mg/dL      BUN 16 mg/dL      Creatinine 1.20 mg/dL      Sodium 135 mmol/L      Potassium 4.0 mmol/L      Chloride 97 mmol/L      CO2 21.0 mmol/L      Calcium 9.8 mg/dL      BUN/Creatinine Ratio 13.3     Anion Gap 17.0 mmol/L      eGFR 73.7 mL/min/1.73     Narrative:      GFR Categories in Chronic Kidney Disease (CKD)      GFR Category          GFR (mL/min/1.73)    Interpretation  G1                     90 or greater         Normal or high (1)  G2                      60-89                Mild decrease (1)  G3a                   45-59                Mild to moderate decrease  G3b                   30-44                Moderate to severe decrease  G4                    15-29                Severe decrease  G5                    14 or less           Kidney failure          (1)In the absence of evidence of kidney disease, neither GFR category G1 or G2 fulfill the criteria for CKD.    eGFR calculation 2021 CKD-EPI creatinine equation, which does not include race as a factor    Magnesium [[id number]]  (Normal)  Collected: 04/14/25 1452    Specimen: Blood from Arm, Left Updated: 04/14/25 1516     Magnesium 1.9 mg/dL     CBC Auto Differential [[id number]]  (Normal) Collected: 04/14/25 1452    Specimen: Blood from Arm, Left Updated: 04/14/25 1459     WBC 8.11 10*3/mm3      RBC 5.26 10*6/mm3      Hemoglobin 15.4 g/dL      Hematocrit 45.9 %      MCV 87.3 fL      MCH 29.3 pg      MCHC 33.6 g/dL      RDW 13.1 %      RDW-SD 41.1 fl      MPV 10.8 fL      Platelets 282 10*3/mm3      Neutrophil % 61.2 %      Lymphocyte % 29.0 %      Monocyte % 7.0 %      Eosinophil % 1.2 %      Basophil % 1.2 %      Immature Grans % 0.4 %      Neutrophils, Absolute 4.96 10*3/mm3      Lymphocytes, Absolute 2.35 10*3/mm3      Monocytes, Absolute 0.57 10*3/mm3      Eosinophils, Absolute 0.10 10*3/mm3      Basophils, Absolute 0.10 10*3/mm3      Immature Grans, Absolute 0.03 10*3/mm3      nRBC 0.0 /100 WBC     Sedimentation Rate [[id number]]  (Abnormal) Collected: 04/14/25 1452    Specimen: Blood from Arm, Left Updated: 04/14/25 1539     Sed Rate 28 mm/hr     C-reactive Protein [[id number]]  (Normal) Collected: 04/14/25 1452    Specimen: Blood from Arm, Left Updated: 04/14/25 1548     C-Reactive Protein 0.47 mg/dL     CK [[id number]]  (Abnormal) Collected: 04/14/25 1452    Specimen: Blood from Arm, Left Updated: 04/14/25 1547     Creatine Kinase 628 U/L     Blood Culture - Blood, Hand, Left [[id number]] Collected: 04/14/25 1452    Specimen: Blood from Hand, Left Updated: 04/14/25 1552    Lactic Acid, Plasma [[id number]]  (Abnormal) Collected: 04/14/25 1453    Specimen: Blood from Arm, Left Updated: 04/14/25 1518     Lactate 2.8 mmol/L     Blood Culture - Blood, Arm, Left [[id number]] Collected: 04/14/25 1544    Specimen: Blood from Arm, Left Updated: 04/14/25 1559          XR Hand 3+ View Right   Final Result       1.  Abnormal appearance of the second distal phalanx with some cortical   bone loss along the radial margin of the second distal phalanx near the    area of soft tissue swelling. In addition there is likely a small   fracture of the distal second phalanx as well. This may represent   sequelae of osteomyelitis and a secondary fracture of the distal second   phalanx.            This report was signed and finalized on 4/14/2025 3:26 PM by Dr. Ramses Bocanegra MD.            Medications   sodium chloride 0.9 % flush 10 mL (has no administration in time range)   vancomycin (VANCOCIN) 2,500 mg in sodium chloride 0.9 % 500 mL IVPB (has no administration in time range)       Medical Decision Making  Dr. Whatley is the hospitalist on-call.  Case was discussed with him.  He has accepted the patient under his group service.    Problems Addressed:  Osteomyelitis, unspecified site, unspecified type: complicated acute illness or injury    Amount and/or Complexity of Data Reviewed  Labs: ordered. Decision-making details documented in ED Course.  Radiology: ordered. Decision-making details documented in ED Course.    Risk  Prescription drug management.  Decision regarding hospitalization.        Final diagnoses:   Osteomyelitis, unspecified site, unspecified type       ED Disposition  ED Disposition       ED Disposition   Decision to Admit    Condition   --    Comment   Level of Care: Remote Telemetry [26]   Diagnosis: Osteomyelitis [670769]   Admitting Physician: AAKASH WHATLEY [[id number]]   Attending Physician: AAKASH WHATLEY [[id number]]   Certification: I Certify That Inpatient Hospital Services Are Medically Necessary For Greater Than 2 Midnights                 No follow-up provider specified.       Medication List      No changes were made to your prescriptions during this visit.            Fabian Galo MD  04/14/25 1641      Electronically signed by Fabian Galo MD at 04/14/25 2940       Natalia Corral, RN at 04/14/25 1439          Pt unable to move finger at this time    Electronically signed by Natalia Corral RN at 04/14/25 1436       Natalia Corral, RN  at 04/14/25 1435          Pt states has been on Bactrim x1 week, has 1 day left for right index finger possible infection.  Pt states minimal drainage.  Pt was at urgent care and referred to ED    Electronically signed by Natalia Corral RN at 04/14/25 1436       Vital Signs (last day)       Date/Time Temp Temp src Pulse Resp BP Patient Position SpO2    04/15/25 0823 98 (36.7) Oral 76 16 120/86 Lying 96    04/15/25 0425 98.1 (36.7) Oral 79 18 128/70 Lying 95    04/14/25 2326 97.8 (36.6) Oral 79 18 123/82 Lying 97    04/14/25 1944 97.8 (36.6) Oral 73 16 112/70 Lying 96    04/14/25 1736 97.4 (36.3) Oral 75 16 113/76 Sitting 100    04/14/25 17:09:42 97.9 (36.6) Oral -- -- -- -- --    04/14/25 1659 -- -- 74 -- 119/79 -- --    04/14/25 1656 -- -- 77 -- 115/79 -- 97    04/14/25 1532 -- -- 77 -- 118/74 -- 96    04/14/25 1427 97.2 (36.2) Temporal 78 18 134/75 Sitting 98          Current Facility-Administered Medications   Medication Dose Route Frequency Provider Last Rate Last Admin    acetaminophen (TYLENOL) tablet 650 mg  650 mg Oral Q4H PRN Ashanti Whatley MD        Or    acetaminophen (TYLENOL) 160 MG/5ML oral solution 650 mg  650 mg Oral Q4H PRN Ashanti Whatley MD        Or    acetaminophen (TYLENOL) suppository 650 mg  650 mg Rectal Q4H PRN Ashanti Whatley MD        sennosides-docusate (PERICOLACE) 8.6-50 MG per tablet 2 tablet  2 tablet Oral BID PRN Ashanti Whatley MD        And    polyethylene glycol (MIRALAX) packet 17 g  17 g Oral Daily PRN Ashanti Whatley MD        And    bisacodyl (DULCOLAX) EC tablet 5 mg  5 mg Oral Daily PRN Ashanti Whatley MD        And    bisacodyl (DULCOLAX) suppository 10 mg  10 mg Rectal Daily PRN Ashanti Whatley MD        Calcium Replacement - Follow Nurse / BPA Driven Protocol   Not Applicable PRN Ashanti Whatley MD        dextrose (D50W) (25 g/50 mL) IV injection 25 g  25 g Intravenous Q15 Min PRN Ashanti Whatley MD        dextrose (GLUTOSE) oral gel 15 g  15 g Oral Q15 Min PRN  Ashanti Whatley MD        glucagon (GLUCAGEN) injection 1 mg  1 mg Intramuscular Q15 Min PRN Ashanti Whatley MD        Insulin Lispro (humaLOG) injection 4-24 Units  4-24 Units Subcutaneous 4x Daily AC & at Bedtime Ashanti Whatley MD   8 Units at 04/15/25 0848    Magnesium Standard Dose Replacement - Follow Nurse / BPA Driven Protocol   Not Applicable PRN Ashanti Whatley MD        Morphine sulfate (PF) injection 1 mg  1 mg Intravenous Q4H PRN Ashanti Whatley MD   1 mg at 04/15/25 0847    And    naloxone (NARCAN) injection 0.4 mg  0.4 mg Intravenous Q5 Min PRN Ashanti Whatley MD        ondansetron (ZOFRAN) injection 4 mg  4 mg Intravenous Q6H PRN Ashanti Whatley MD   4 mg at 04/14/25 1822    oxyCODONE-acetaminophen (PERCOCET) 5-325 MG per tablet 1 tablet  1 tablet Oral Q8H PRN Ashanti Whatley MD   1 tablet at 04/14/25 1822    Phosphorus Replacement - Follow Nurse / BPA Driven Protocol   Not Applicable PRN Ashanti Whatley MD        piperacillin-tazobactam (ZOSYN) 3.375 g IVPB in 100 mL NS MBP (CD)  3.375 g Intravenous Q8H Ashanti Whatley MD   3.375 g at 04/15/25 0848    Potassium Replacement - Follow Nurse / BPA Driven Protocol   Not Applicable PRN Ashanti Whatley MD        sodium chloride 0.9 % flush 10 mL  10 mL Intravenous PRN Fabian Galo MD        sodium chloride 0.9 % flush 10 mL  10 mL Intravenous Q12H Ashanti Whatley MD   10 mL at 04/15/25 0958    sodium chloride 0.9 % flush 10 mL  10 mL Intravenous PRN Ashanti Whatley MD        sodium chloride 0.9 % infusion 40 mL  40 mL Intravenous PRN Ashanti Whatley MD        vancomycin (VANCOCIN) 1,000 mg in sodium chloride 0.9 % 250 mL IVPB-VTB  1,000 mg Intravenous Q12H Ashanti Whatley  mL/hr at 04/15/25 0528 1,000 mg at 04/15/25 0528      encounter.          Consult Notes (last 24 hours)        Jimmy Kennedy MD at 04/15/25 0732        Consult Orders    1. Inpatient Infectious Diseases Consult [[id number]] ordered by Ashanti Whatley MD at 04/14/25 1780          "        INFECTIOUS DISEASES CONSULT NOTE    Patient:  James Mar 49 y.o. male  ROOM # 374/1  YOB: 1975  MRN: [id number]  [facility]:  [id number]  Admit date: 4/14/2025   Admitting Physician: Ashanti Whatley MD  Primary Care Physician: Radha Mortensen APRN  REFERRING PROVIDER: No ref. provider found    Reason for Consultation: \"Right hand osteo\" pleasant 49-year-old man.  He indicates symptoms started a little more than a week ago.    History of Present Illness/Chief Complaint: He noticed some swelling involving the distal aspect of the right index finger.  He indicates symptoms progressed.  It was some uncomfortable or somewhat uncomfortable and sore to touch.  He cannot recall any penetrating trauma or crush injury.  He does work cutting trees.  He indicates at first he thought he may have a splinter or a metal fragment,  But he could not remember any specific injury.  He indicates he went to [place] [facility] initially.  He was evaluated and released.  He indicates a day or 2 later he went to urgent care/[facility] in [place].  He started Bactrim.  He does not report significant change.  Due to ongoing symptoms he returned and then was referred to [facility] and was admitted for further management.  He indicates he last received his tetanus booster about 5 years ago.  He indicates history of diabetes.  Reports no other significant past medical history.  Fever has not been a prominent symptom.  He does smoke tobacco.  He has not had other symptoms other than the right index finger.      Current Scheduled Medications:   insulin lispro, 4-24 Units, Subcutaneous, 4x Daily AC & at Bedtime  piperacillin-tazobactam, 3.375 g, Intravenous, Q8H  sodium chloride, 10 mL, Intravenous, Q12H  vancomycin, 1,000 mg, Intravenous, Q12H      Current PRN Medications:    acetaminophen **OR** acetaminophen **OR** acetaminophen    senna-docusate sodium **AND** polyethylene glycol **AND** bisacodyl **AND** bisacodyl    " "Calcium Replacement - Follow Nurse / BPA Driven Protocol    dextrose    dextrose    glucagon (human recombinant)    Magnesium Standard Dose Replacement - Follow Nurse / BPA Driven Protocol    Morphine **AND** naloxone    ondansetron    oxyCODONE-acetaminophen    Phosphorus Replacement - Follow Nurse / BPA Driven Protocol    Potassium Replacement - Follow Nurse / BPA Driven Protocol    [COMPLETED] Insert Peripheral IV **AND** sodium chloride    sodium chloride    sodium chloride    Allergies:  No Known Allergies    Past Medical History:   Diabetes mellitus.  Hypertension.  Tobacco use.  History DVT.    Past Surgical History: Laparoscopic cholecystectomy.  Appendectomy.  Coronary artery stenting.  Varicose vein surgery.      Social History: Smokes 1 pack/day.  No alcohol use.  No illicit drug use.  Works cutting down and hauling off trees.    Family History: Heart disease.  Hypertension.    Exposure History: No close contacts have been ill no nausea, diarrhea, rash, or skin itching    Review of Systems     Vital Signs:  /70 (BP Location: Right arm, Patient Position: Lying)   Pulse 79   Temp 98.1 °F (36.7 °C) (Oral)   Resp 18   Ht 185.4 cm (73\")   Wt 130 kg (286 lb 8 oz)   SpO2 95%   BMI 37.80 kg/m²  Temp (24hrs), Av.7 °F (36.5 °C), Min:97.2 °F (36.2 °C), Max:98.1 °F (36.7 °C)    Physical Exam  Vital signs - reviewed.  Line/IV site - No erythema or tenderness.  Lungs without crackles  Heart without murmur  Abdomen soft and nontender  There is swelling and discoloration distal aspect of the right index finger  No drainage currently  Skin on hands somewhat dirty in appearance      Lab Results:  CBC:   Results from last 7 days   Lab Units 04/15/25  02325  17025  1452   WBC 10*3/mm3 7.36 8.17 8.11   HEMOGLOBIN g/dL 14.4 15.1 15.4   HEMATOCRIT % 42.8 45.1 45.9   PLATELETS 10*3/mm3 282 276 282     CMP:   Results from last 7 days   Lab Units 04/15/25  0235 25  1707 25  1452 "   SODIUM mmol/L 137 136 135*  135*   POTASSIUM mmol/L 4.1 3.8 4.0  4.0   CHLORIDE mmol/L 105 100 97*  97*   CO2 mmol/L 21.0* 21.0* 18.0*  21.0*   BUN mg/dL 14 16 17  16   CREATININE mg/dL 1.07 1.00 1.22  1.20   CALCIUM mg/dL 8.8 9.3 9.9  9.8   BILIRUBIN mg/dL  --   --  0.3   ALK PHOS U/L  --   --  68   ALT (SGPT) U/L  --   --  32   AST (SGOT) U/L  --   --  26   GLUCOSE mg/dL 208* 245* 331*  327*     Radiology:     X-rays right hand:  IMPRESSION:   1.  Abnormal appearance of the second distal phalanx with some cortical  bone loss along the radial margin of the second distal phalanx near the  area of soft tissue swelling. In addition there is likely a small  fracture of the distal second phalanx as well. This may represent  sequelae of osteomyelitis and a secondary fracture of the distal second  phalanx.     Additional Studies Reviewed:     Impression:   Swelling discoloration distal aspect of right index finger.  He may have had paronychia with infection that has progressed.  He may have had some unrecalled trauma to the distal finger with bruising/swelling and some secondary infection.  There is some evidence of possible small fracture distal aspect of the right second finger. He works cutting and hauling off trees.  Penetrating injury/foreign body remains in the differential.    Recommendations:    He appears to be  up-to-date on his tetanus per history having had this within the past 5 years  Continue broad antibiotic treatment with vancomycin and Zosyn  Clean hands with warm soap and water  Continue broad empiric antibiotic treatment  Agree with operative exploration/irrigation/debridement  Would recommend aerobic and anaerobic tissue culture, AFB culture and fungal culture  Monitor clinical course    Jimmy Liang MD  04/15/25  07:32 CDT            Electronically signed by Jimmy Liang MD at 04/15/25 0741       Luis Miguel Sahni MD at 04/15/25 0704        Consult Orders    1. Inpatient Orthopedic  Surgery Consult [[id number]] ordered by Ashanti Whatley MD at 04/14/25 1658                 Orthopaedic Surgery Consult Note      4/15/2025   07:05 CDT    Name:  James Mar  MRN:    [id number]     Acct:     [id number]   Room:  [address] [place] [address] Day: 1     Admit Date: 4/14/2025  PCP: Radha Mortensen APRN    Consulting Provider: Dr. Ashanti Whatley    Subjective:     C/C: Right index finger pain, swelling, redness    HPI: Mr. Mar is a 49-year-old right-hand-dominant gentleman who was admitted to the ER yesterday with an approximately 1 week history of progressive right index finger distal phalanx pain, swelling, redness and decreased range of motion.  He has been evaluated at an outlying urgent care and initially prescribed Bactrim for approximately 1 week.  He returned yesterday for repeat evaluation and was subsequently sent to [facility].  Radiographs were obtained which were concerning for possible osteomyelitis.  Therefore, he was admitted for IV antibiotics and further evaluation/management.  Orthopedics was consulted for evaluation of the right index finger.  On interview this morning, he reports ongoing, relatively stable pain from admission.  Localizes pain about the index finger.  Denies any other joint pain.  Denies any subjective fever or chills.  Denies nausea/vomiting.  Of note, he does have history of MRSA.    Code Status:    Code Status and Medical Interventions: CPR (Attempt to Resuscitate); Full Support   Ordered at: 04/14/25 1658     Code Status (Patient has no pulse and is not breathing):    CPR (Attempt to Resuscitate)     Medical Interventions (Patient has pulse or is breathing):    Full Support         Past Medical History:    Past Medical History:   Diagnosis Date    Cellulitis and abscess of left leg     Class 1 obesity     Diabetes mellitus     DVT (deep venous thrombosis)     Hyperlipidemia     Hypertension     MRSA infection     Tobacco abuse        Past Surgical History:    Past  Surgical History:   Procedure Laterality Date    APPENDECTOMY      CORONARY STENT PLACEMENT  2023    [facility]    LAPAROSCOPIC CHOLECYSTECTOMY      VARICOSE VEIN SURGERY Left 4/19/2024    Procedure: LEFT LOWER EXTREMITY RADIOFREQUENCY ABLATION;  Surgeon: Theodore Ch DO;  Location: [facility] HYBRID OR;  Service: Vascular;  Laterality: Left;       Current Medications:   Prior to Admission medications    Medication Sig Start Date End Date Taking? Authorizing Provider   aspirin 81 MG EC tablet Take 1 tablet by mouth Daily.   Yes Triston Christian MD   atorvastatin (LIPITOR) 80 MG tablet Take 1 tablet by mouth Daily.   Yes Triston Christian MD   glipizide (GLUCOTROL) 10 MG tablet Take 1 tablet by mouth 2 (Two) Times a Day Before Meals.   Yes Triston Christian MD   losartan (COZAAR) 50 MG tablet Take 1 tablet by mouth Daily.   Yes Triston Christian MD   metFORMIN (GLUCOPHAGE) 500 MG tablet Take 1 tablet by mouth 2 (Two) Times a Day With Meals.   Yes Triston Christian MD   metoprolol succinate XL (TOPROL-XL) 50 MG 24 hr tablet Take 1 tablet by mouth Daily.   Yes Triston Christian MD   prasugrel (EFFIENT) 10 MG tablet Take 1 tablet by mouth Daily.   Yes Triston Christian MD   Semaglutide, 1 MG/DOSE, (Ozempic, 1 MG/DOSE,) 4 MG/3ML solution pen-injector Inject 1 mg under the skin into the appropriate area as directed 1 (One) Time Per Week.   Yes Triston Christian MD   sulfamethoxazole-trimethoprim (BACTRIM DS,SEPTRA DS) 800-160 MG per tablet Take 1 tablet by mouth 2 (Two) Times a Day. X7 days 4/7/25 4/14/25 Yes Triston Christian MD   tiZANidine (ZANAFLEX) 4 MG tablet Take 1 tablet by mouth Every 8 (Eight) Hours As Needed for Muscle Spasms.   Yes Triston Christian MD       Allergies:  Patient has no known allergies.    Social History:   Social History     Socioeconomic History    Marital status:      Spouse name: Mrs. Patrick Mar    Number of children: 3   Tobacco Use     "Smoking status: Every Day     Current packs/day: 1.00     Types: Cigarettes     Passive exposure: Past    Smokeless tobacco: Never   Vaping Use    Vaping status: Never Used   Substance and Sexual Activity    Alcohol use: Yes     Comment: rare special occasions    Drug use: Yes     Types: Marijuana     Comment: every now and then    Sexual activity: Defer     Partners: Female       Family History:   Family History   Problem Relation Age of Onset    Hypertension Mother     Heart attack Father     Heart attack Maternal Uncle     Heart attack Paternal Uncle          REVIEW OF SYSTEMS:    Complete review of systems was reviewed from admission H&P on date of admission, no interval changes.      Vitals:  /70 (BP Location: Right arm, Patient Position: Lying)   Pulse 79   Temp 98.1 °F (36.7 °C) (Oral)   Resp 18   Ht 185.4 cm (73\")   Wt 130 kg (286 lb 8 oz)   SpO2 95%   BMI 37.80 kg/m²   Temp (24hrs), Av.7 °F (36.5 °C), Min:97.2 °F (36.2 °C), Max:98.1 °F (36.7 °C)      I/O (24Hr):  No intake or output data in the 24 hours ending 04/15/25 0705    Labs:  Lab Results (last 72 hours)       Procedure Component Value Units Date/Time    Basic Metabolic Panel [[id number]]  (Abnormal) Collected: 04/15/25 0235    Specimen: Blood Updated: 04/15/25 0353     Glucose 208 mg/dL      BUN 14 mg/dL      Creatinine 1.07 mg/dL      Sodium 137 mmol/L      Potassium 4.1 mmol/L      Chloride 105 mmol/L      CO2 21.0 mmol/L      Calcium 8.8 mg/dL      BUN/Creatinine Ratio 13.1     Anion Gap 11.0 mmol/L      eGFR 84.5 mL/min/1.73     Narrative:      GFR Categories in Chronic Kidney Disease (CKD)      GFR Category          GFR (mL/min/1.73)    Interpretation  G1                     90 or greater         Normal or high (1)  G2                      60-89                Mild decrease (1)  G3a                   45-59                Mild to moderate decrease  G3b                   30-44                Moderate to severe decrease  G4         "            15-29                Severe decrease  G5                    14 or less           Kidney failure          (1)In the absence of evidence of kidney disease, neither GFR category G1 or G2 fulfill the criteria for CKD.    eGFR calculation 2021 CKD-EPI creatinine equation, which does not include race as a factor    CBC & Differential [[id number]]  (Normal) Collected: 04/15/25 0235    Specimen: Blood Updated: 04/15/25 0322    Narrative:      The following orders were created for panel order CBC & Differential.  Procedure                               Abnormality         Status                     ---------                               -----------         ------                     CBC Auto Differential[[id number]]        Normal              Final result                 Please view results for these tests on the individual orders.    CBC Auto Differential [[id number]]  (Normal) Collected: 04/15/25 0235    Specimen: Blood Updated: 04/15/25 0322     WBC 7.36 10*3/mm3      RBC 4.89 10*6/mm3      Hemoglobin 14.4 g/dL      Hematocrit 42.8 %      MCV 87.5 fL      MCH 29.4 pg      MCHC 33.6 g/dL      RDW 13.2 %      RDW-SD 42.0 fl      MPV 11.0 fL      Platelets 282 10*3/mm3      Neutrophil % 58.0 %      Lymphocyte % 31.0 %      Monocyte % 7.7 %      Eosinophil % 1.8 %      Basophil % 1.0 %      Immature Grans % 0.5 %      Neutrophils, Absolute 4.27 10*3/mm3      Lymphocytes, Absolute 2.28 10*3/mm3      Monocytes, Absolute 0.57 10*3/mm3      Eosinophils, Absolute 0.13 10*3/mm3      Basophils, Absolute 0.07 10*3/mm3      Immature Grans, Absolute 0.04 10*3/mm3      nRBC 0.0 /100 WBC     MRSA Screen, PCR (Inpatient) - Swab, Nares [[id number]]  (Abnormal) Collected: 04/14/25 2039    Specimen: Swab from Nares Updated: 04/14/25 2233     MRSA PCR MRSA Detected    Narrative:      The negative predictive value of this diagnostic test is high and should only be used to consider de-escalating anti-MRSA therapy. A positive result may  indicate colonization with MRSA and must be correlated clinically.    Lactic Acid, Plasma [[id number]]  (Abnormal) Collected: 04/14/25 2048    Specimen: Blood Updated: 04/14/25 2135     Lactate 2.1 mmol/L     POC Glucose Once [[id number]]  (Abnormal) Collected: 04/14/25 2028    Specimen: Blood Updated: 04/14/25 2040     Glucose 267 mg/dL      Comment: : [id number] Gissel PhillipMeter ID: [id number]       POC Glucose Once [[id number]]  (Abnormal) Collected: 04/14/25 1737    Specimen: Blood Updated: 04/14/25 1755     Glucose 231 mg/dL      Comment: : [id number] Nepali DeannaMeter ID: [id number]       Basic Metabolic Panel [[id number]]  (Abnormal) Collected: 04/14/25 1707    Specimen: Blood from Arm, Left Updated: 04/14/25 1734     Glucose 245 mg/dL      BUN 16 mg/dL      Creatinine 1.00 mg/dL      Sodium 136 mmol/L      Potassium 3.8 mmol/L      Chloride 100 mmol/L      CO2 21.0 mmol/L      Calcium 9.3 mg/dL      BUN/Creatinine Ratio 16.0     Anion Gap 15.0 mmol/L      eGFR 91.7 mL/min/1.73     Narrative:      GFR Categories in Chronic Kidney Disease (CKD)      GFR Category          GFR (mL/min/1.73)    Interpretation  G1                     90 or greater         Normal or high (1)  G2                      60-89                Mild decrease (1)  G3a                   45-59                Mild to moderate decrease  G3b                   30-44                Moderate to severe decrease  G4                    15-29                Severe decrease  G5                    14 or less           Kidney failure          (1)In the absence of evidence of kidney disease, neither GFR category G1 or G2 fulfill the criteria for CKD.    eGFR calculation 2021 CKD-EPI creatinine equation, which does not include race as a factor    Comprehensive Metabolic Panel [[id number]]  (Abnormal) Collected: 04/14/25 1452    Specimen: Blood from Arm, Left Updated: 04/14/25 1715     Glucose 331 mg/dL      BUN 17 mg/dL      Creatinine 1.22 mg/dL       Sodium 135 mmol/L      Potassium 4.0 mmol/L      Comment: Slight hemolysis detected by analyzer. Result may be falsely elevated.        Chloride 97 mmol/L      CO2 18.0 mmol/L      Calcium 9.9 mg/dL      Total Protein 7.4 g/dL      Albumin 4.3 g/dL      ALT (SGPT) 32 U/L      AST (SGOT) 26 U/L      Alkaline Phosphatase 68 U/L      Total Bilirubin 0.3 mg/dL      Globulin 3.1 gm/dL      A/G Ratio 1.4 g/dL      BUN/Creatinine Ratio 13.9     Anion Gap 20.0 mmol/L      eGFR 72.2 mL/min/1.73     Narrative:      GFR Categories in Chronic Kidney Disease (CKD)      GFR Category          GFR (mL/min/1.73)    Interpretation  G1                     90 or greater         Normal or high (1)  G2                      60-89                Mild decrease (1)  G3a                   45-59                Mild to moderate decrease  G3b                   30-44                Moderate to severe decrease  G4                    15-29                Severe decrease  G5                    14 or less           Kidney failure          (1)In the absence of evidence of kidney disease, neither GFR category G1 or G2 fulfill the criteria for CKD.    eGFR calculation 2021 CKD-EPI creatinine equation, which does not include race as a factor    Hemoglobin A1c [[id number]]  (Abnormal) Collected: 04/14/25 1452    Specimen: Blood from Arm, Left Updated: 04/14/25 1714     Hemoglobin A1C 9.30 %     Narrative:      Hemoglobin A1C Ranges:    Increased Risk for Diabetes  5.7% to 6.4%  Diabetes                     >= 6.5%  Diabetic Goal                < 7.0%    CBC & Differential [[id number]]  (Normal) Collected: 04/14/25 1707    Specimen: Blood from Arm, Left Updated: 04/14/25 1713    Narrative:      The following orders were created for panel order CBC & Differential.  Procedure                               Abnormality         Status                     ---------                               -----------         ------                     CBC Auto  Differential[[id number]]        Normal              Final result                 Please view results for these tests on the individual orders.    CBC Auto Differential [[id number]]  (Normal) Collected: 04/14/25 1707    Specimen: Blood from Arm, Left Updated: 04/14/25 1713     WBC 8.17 10*3/mm3      RBC 5.18 10*6/mm3      Hemoglobin 15.1 g/dL      Hematocrit 45.1 %      MCV 87.1 fL      MCH 29.2 pg      MCHC 33.5 g/dL      RDW 13.1 %      RDW-SD 41.8 fl      MPV 10.6 fL      Platelets 276 10*3/mm3      Neutrophil % 56.2 %      Lymphocyte % 33.7 %      Monocyte % 6.9 %      Eosinophil % 1.5 %      Basophil % 1.5 %      Immature Grans % 0.2 %      Neutrophils, Absolute 4.60 10*3/mm3      Lymphocytes, Absolute 2.75 10*3/mm3      Monocytes, Absolute 0.56 10*3/mm3      Eosinophils, Absolute 0.12 10*3/mm3      Basophils, Absolute 0.12 10*3/mm3      Immature Grans, Absolute 0.02 10*3/mm3      nRBC 0.0 /100 WBC     Blood Culture - Blood, Arm, Left [[id number]] Collected: 04/14/25 1544    Specimen: Blood from Arm, Left Updated: 04/14/25 1552    Blood Culture - Blood, Hand, Left [[id number]] Collected: 04/14/25 1452    Specimen: Blood from Hand, Left Updated: 04/14/25 1552    C-reactive Protein [[id number]]  (Normal) Collected: 04/14/25 1452    Specimen: Blood from Arm, Left Updated: 04/14/25 1548     C-Reactive Protein 0.47 mg/dL     CK [[id number]]  (Abnormal) Collected: 04/14/25 1452    Specimen: Blood from Arm, Left Updated: 04/14/25 1547     Creatine Kinase 628 U/L     Sedimentation Rate [[id number]]  (Abnormal) Collected: 04/14/25 1452    Specimen: Blood from Arm, Left Updated: 04/14/25 1539     Sed Rate 28 mm/hr     Lactic Acid, Plasma [[id number]]  (Abnormal) Collected: 04/14/25 1453    Specimen: Blood from Arm, Left Updated: 04/14/25 1518     Lactate 2.8 mmol/L     Basic Metabolic Panel [[id number]]  (Abnormal) Collected: 04/14/25 1452    Specimen: Blood from Arm, Left Updated: 04/14/25 1516     Glucose 327 mg/dL      BUN 16  mg/dL      Creatinine 1.20 mg/dL      Sodium 135 mmol/L      Potassium 4.0 mmol/L      Chloride 97 mmol/L      CO2 21.0 mmol/L      Calcium 9.8 mg/dL      BUN/Creatinine Ratio 13.3     Anion Gap 17.0 mmol/L      eGFR 73.7 mL/min/1.73     Narrative:      GFR Categories in Chronic Kidney Disease (CKD)      GFR Category          GFR (mL/min/1.73)    Interpretation  G1                     90 or greater         Normal or high (1)  G2                      60-89                Mild decrease (1)  G3a                   45-59                Mild to moderate decrease  G3b                   30-44                Moderate to severe decrease  G4                    15-29                Severe decrease  G5                    14 or less           Kidney failure          (1)In the absence of evidence of kidney disease, neither GFR category G1 or G2 fulfill the criteria for CKD.    eGFR calculation 2021 CKD-EPI creatinine equation, which does not include race as a factor    Magnesium [[id number]]  (Normal) Collected: 04/14/25 1452    Specimen: Blood from Arm, Left Updated: 04/14/25 1516     Magnesium 1.9 mg/dL     CBC & Differential [[id number]]  (Normal) Collected: 04/14/25 1452    Specimen: Blood from Arm, Left Updated: 04/14/25 1459    Narrative:      The following orders were created for panel order CBC & Differential.  Procedure                               Abnormality         Status                     ---------                               -----------         ------                     CBC Auto Differential[[id number]]        Normal              Final result                 Please view results for these tests on the individual orders.    CBC Auto Differential [[id number]]  (Normal) Collected: 04/14/25 1452    Specimen: Blood from Arm, Left Updated: 04/14/25 1459     WBC 8.11 10*3/mm3      RBC 5.26 10*6/mm3      Hemoglobin 15.4 g/dL      Hematocrit 45.9 %      MCV 87.3 fL      MCH 29.3 pg      MCHC 33.6 g/dL      RDW 13.1 %       RDW-SD 41.1 fl      MPV 10.8 fL      Platelets 282 10*3/mm3      Neutrophil % 61.2 %      Lymphocyte % 29.0 %      Monocyte % 7.0 %      Eosinophil % 1.2 %      Basophil % 1.2 %      Immature Grans % 0.4 %      Neutrophils, Absolute 4.96 10*3/mm3      Lymphocytes, Absolute 2.35 10*3/mm3      Monocytes, Absolute 0.57 10*3/mm3      Eosinophils, Absolute 0.10 10*3/mm3      Basophils, Absolute 0.10 10*3/mm3      Immature Grans, Absolute 0.03 10*3/mm3      nRBC 0.0 /100 WBC             Radiology:  Imaging Results (Last 72 Hours)       Procedure Component Value Units Date/Time    XR Hand 3+ View Right [[id number]] Collected: 04/14/25 1522     Updated: 04/14/25 1529    Narrative:      EXAMINATION: XR HAND 3+ VW RIGHT-  4/14/2025 3:22 PM     HISTORY: Index finger infection     COMPARISON: None      TECHNIQUE:  Frontal, lateral and oblique radiographs of the RIGHT hand were provided  for review.     FINDINGS:  Focal soft tissue swelling over the radial aspect of the distal second  phalanx with subtle lucency in the radial margin of the distal tuft of  the second distal phalanx. I am suspicious this may represent some  cortical erosion and osteomyelitis. Old healed second metacarpal  fracture noted. On the lateral view of the second digit, there is  cortical disruption along the palmar aspect of the distal phalanx. This  could potentially represent a focal fracture.          Impression:         1.  Abnormal appearance of the second distal phalanx with some cortical  bone loss along the radial margin of the second distal phalanx near the  area of soft tissue swelling. In addition there is likely a small  fracture of the distal second phalanx as well. This may represent  sequelae of osteomyelitis and a secondary fracture of the distal second  phalanx.         This report was signed and finalized on 4/14/2025 3:26 PM by Dr. Ramses Bocanegra MD.               Physical Exam:     PHYSICAL EXAM:      Physical Examination:  Vitals:    Vitals:    04/14/25 1736 04/14/25 1944 04/14/25 2326 04/15/25 0425   BP: 113/76 112/70 123/82 128/70   BP Location: Left arm Left arm Left arm Right arm   Patient Position: Sitting Lying Lying Lying   Pulse: 75 73 79 79   Resp: 16 16 18 18   Temp: 97.4 °F (36.3 °C) 97.8 °F (36.6 °C) 97.8 °F (36.6 °C) 98.1 °F (36.7 °C)   TempSrc: Oral Oral Oral Oral   SpO2: 100% 96% 97% 95%   Weight:       Height:         General:  Appears stated age, no distress.  Orientation:  Alert and oriented to time, place, and person.  HEENT: Head is normocephalic, atraumatic.  No gross visual or auditory acuity deficits.  Mood and Affect:  Cooperative and pleasant.  Gait:  Resting comfortably in bed.  Cardiovascular:  Symmetric 1-2 plus pulses in upper and lower extremities.  Sensation:  Grossly intact to light touch.  Coordination/balance:  Normal  Musculoskeletal:  Right upper extremity exam: On inspection, purulent collection along the radial paronychial and eponychial skin folds of the right index finger with associated surrounding edema and erythema.  Subcutaneous purulence extends palmarly to involve the radial half of the hyponychium.  Nail plate is intact.  Callus noted involving most digits of the right hand.  No active bleeding or drainage from the right index finger.  Significant tenderness to palpation about the right index distal phalanx, less significant but moderate tenderness to right index middle and proximal phalanges.  No significant tenderness extending into the palm of the right hand.  No tenderness to the remaining digits.  Significantly limited active and passive motion of the right index finger due to pain.  No significant discomfort with digital motion of uninvolved digits.  No pain with active wrist range of motion.  No gross neurovascular deficit to the right hand.      Radiology Review  My review of patient's x-rays shows the patient to have significant soft tissue edema about right index finger distal phalanx,  no clear bony resorption noted.  Healed second metacarpal shaft fracture with malunion.  No other acute fracture or dislocation is noted.    Assessment:     50 y/o RHD M with R index finger paronychial/eponychial abscess, poorly controlled type 2 diabetes mellitus-recent hemoglobin A1c 9.3, significant medical history for hypertension, coronary artery disease status post stenting, hyperlipidemia, history of prior MRSA infection, history of tobacco use, obesity    Plan:   Right index finger: Reviewed clinical findings with he and significant other who was present this morning.  Recommending MRI to assess for potential deeper infection and more proximal involvement.  Will plan for incision and drainage of right index finger tomorrow.  Recommended tight blood sugar control. Ok for diet today from ortho perspective, NPO at midnight.  2. Activity: Up ad tab, elevate right upper extremity  3. Pain control  4. ID: Continue IV abx      Electronically signed by Luis Miguel Sahni MD at 04/15/25 0720         PRN MEDS    4/14  Morphine iv x1    4/15 morphine iv x2          4/15   Patient A&O x4. Patient c/o pain in right index finger, PRN pain med given. No c/o nausea. Patient on room air. Continuous pulse ox in use. Repositioning independently. IVF infusing and IV antibiotics given per order. Tele on, sinus rhythm. NPO since midnight.

## 2025-04-16 ENCOUNTER — ANESTHESIA (OUTPATIENT)
Dept: PERIOP | Facility: HOSPITAL | Age: 50
End: 2025-04-16
Payer: COMMERCIAL

## 2025-04-16 ENCOUNTER — ANESTHESIA EVENT (OUTPATIENT)
Dept: PERIOP | Facility: HOSPITAL | Age: 50
End: 2025-04-16
Payer: COMMERCIAL

## 2025-04-16 PROBLEM — M86.9 OSTEOMYELITIS: Status: RESOLVED | Noted: 2025-04-14 | Resolved: 2025-04-16

## 2025-04-16 PROBLEM — L03.011 ACUTE PARONYCHIA OF FINGER OF RIGHT HAND: Status: ACTIVE | Noted: 2025-04-16

## 2025-04-16 LAB
ANION GAP SERPL CALCULATED.3IONS-SCNC: 13 MMOL/L (ref 5–15)
BASOPHILS # BLD AUTO: 0.11 10*3/MM3 (ref 0–0.2)
BASOPHILS NFR BLD AUTO: 1.7 % (ref 0–1.5)
BUN SERPL-MCNC: 10 MG/DL (ref 6–20)
BUN/CREAT SERPL: 12.2 (ref 7–25)
CALCIUM SPEC-SCNC: 9.1 MG/DL (ref 8.6–10.5)
CHLORIDE SERPL-SCNC: 98 MMOL/L (ref 98–107)
CO2 SERPL-SCNC: 24 MMOL/L (ref 22–29)
CREAT SERPL-MCNC: 0.82 MG/DL (ref 0.76–1.27)
DEPRECATED RDW RBC AUTO: 41.7 FL (ref 37–54)
EGFRCR SERPLBLD CKD-EPI 2021: 107 ML/MIN/1.73
EOSINOPHIL # BLD AUTO: 0.09 10*3/MM3 (ref 0–0.4)
EOSINOPHIL NFR BLD AUTO: 1.4 % (ref 0.3–6.2)
ERYTHROCYTE [DISTWIDTH] IN BLOOD BY AUTOMATED COUNT: 12.9 % (ref 12.3–15.4)
GLUCOSE BLDC GLUCOMTR-MCNC: 148 MG/DL (ref 70–130)
GLUCOSE BLDC GLUCOMTR-MCNC: 194 MG/DL (ref 70–130)
GLUCOSE BLDC GLUCOMTR-MCNC: 228 MG/DL (ref 70–130)
GLUCOSE BLDC GLUCOMTR-MCNC: 254 MG/DL (ref 70–130)
GLUCOSE BLDC GLUCOMTR-MCNC: 259 MG/DL (ref 70–130)
GLUCOSE SERPL-MCNC: 273 MG/DL (ref 65–99)
HCT VFR BLD AUTO: 45.1 % (ref 37.5–51)
HGB BLD-MCNC: 14.8 G/DL (ref 13–17.7)
IMM GRANULOCYTES # BLD AUTO: 0.03 10*3/MM3 (ref 0–0.05)
IMM GRANULOCYTES NFR BLD AUTO: 0.5 % (ref 0–0.5)
LYMPHOCYTES # BLD AUTO: 1.94 10*3/MM3 (ref 0.7–3.1)
LYMPHOCYTES NFR BLD AUTO: 30 % (ref 19.6–45.3)
MCH RBC QN AUTO: 29 PG (ref 26.6–33)
MCHC RBC AUTO-ENTMCNC: 32.8 G/DL (ref 31.5–35.7)
MCV RBC AUTO: 88.3 FL (ref 79–97)
MONOCYTES # BLD AUTO: 0.4 10*3/MM3 (ref 0.1–0.9)
MONOCYTES NFR BLD AUTO: 6.2 % (ref 5–12)
NEUTROPHILS NFR BLD AUTO: 3.9 10*3/MM3 (ref 1.7–7)
NEUTROPHILS NFR BLD AUTO: 60.2 % (ref 42.7–76)
NRBC BLD AUTO-RTO: 0 /100 WBC (ref 0–0.2)
PLATELET # BLD AUTO: 260 10*3/MM3 (ref 140–450)
PMV BLD AUTO: 10.8 FL (ref 6–12)
POTASSIUM SERPL-SCNC: 4.4 MMOL/L (ref 3.5–5.2)
RBC # BLD AUTO: 5.11 10*6/MM3 (ref 4.14–5.8)
SODIUM SERPL-SCNC: 135 MMOL/L (ref 136–145)
VANCOMYCIN TROUGH SERPL-MCNC: 5.8 MCG/ML (ref 5–20)
WBC NRBC COR # BLD AUTO: 6.47 10*3/MM3 (ref 3.4–10.8)

## 2025-04-16 PROCEDURE — 80202 ASSAY OF VANCOMYCIN: CPT | Performed by: HOSPITALIST

## 2025-04-16 PROCEDURE — 25810000003 LACTATED RINGERS PER 1000 ML: Performed by: ORTHOPAEDIC SURGERY

## 2025-04-16 PROCEDURE — 87075 CULTR BACTERIA EXCEPT BLOOD: CPT | Performed by: ORTHOPAEDIC SURGERY

## 2025-04-16 PROCEDURE — 25010000002 PIPERACILLIN SOD-TAZOBACTAM PER 1 G: Performed by: HOSPITALIST

## 2025-04-16 PROCEDURE — 82948 REAGENT STRIP/BLOOD GLUCOSE: CPT

## 2025-04-16 PROCEDURE — 25010000002 PIPERACILLIN SOD-TAZOBACTAM PER 1 G: Performed by: ORTHOPAEDIC SURGERY

## 2025-04-16 PROCEDURE — 25010000002 FENTANYL CITRATE (PF) 100 MCG/2ML SOLUTION

## 2025-04-16 PROCEDURE — 25010000002 LIDOCAINE PF 2% 2 % SOLUTION

## 2025-04-16 PROCEDURE — 36415 COLL VENOUS BLD VENIPUNCTURE: CPT | Performed by: HOSPITALIST

## 2025-04-16 PROCEDURE — 25010000002 ONDANSETRON PER 1 MG

## 2025-04-16 PROCEDURE — 25010000002 LIDOCAINE 1% - EPINEPHRINE 1:100000 1 %-1:100000 SOLUTION: Performed by: ORTHOPAEDIC SURGERY

## 2025-04-16 PROCEDURE — 25010000002 VANCOMYCIN 1.75-0.9 GM/500ML-% SOLUTION: Performed by: HOSPITALIST

## 2025-04-16 PROCEDURE — 87102 FUNGUS ISOLATION CULTURE: CPT | Performed by: ORTHOPAEDIC SURGERY

## 2025-04-16 PROCEDURE — 25010000002 DEXAMETHASONE PER 1 MG

## 2025-04-16 PROCEDURE — 0JBJ0ZZ EXCISION OF RIGHT HAND SUBCUTANEOUS TISSUE AND FASCIA, OPEN APPROACH: ICD-10-PCS | Performed by: ORTHOPAEDIC SURGERY

## 2025-04-16 PROCEDURE — 87205 SMEAR GRAM STAIN: CPT | Performed by: ORTHOPAEDIC SURGERY

## 2025-04-16 PROCEDURE — 87186 SC STD MICRODIL/AGAR DIL: CPT | Performed by: ORTHOPAEDIC SURGERY

## 2025-04-16 PROCEDURE — 99232 SBSQ HOSP IP/OBS MODERATE 35: CPT | Performed by: INTERNAL MEDICINE

## 2025-04-16 PROCEDURE — 25010000002 MORPHINE PER 10 MG: Performed by: HOSPITALIST

## 2025-04-16 PROCEDURE — 25010000002 PROPOFOL 10 MG/ML EMULSION

## 2025-04-16 PROCEDURE — 87147 CULTURE TYPE IMMUNOLOGIC: CPT | Performed by: ORTHOPAEDIC SURGERY

## 2025-04-16 PROCEDURE — 0J9J0ZZ DRAINAGE OF RIGHT HAND SUBCUTANEOUS TISSUE AND FASCIA, OPEN APPROACH: ICD-10-PCS | Performed by: ORTHOPAEDIC SURGERY

## 2025-04-16 PROCEDURE — 85025 COMPLETE CBC W/AUTO DIFF WBC: CPT | Performed by: HOSPITALIST

## 2025-04-16 PROCEDURE — 25010000002 MORPHINE PER 10 MG: Performed by: ORTHOPAEDIC SURGERY

## 2025-04-16 PROCEDURE — 25010000002 VANCOMYCIN 1.75-0.9 GM/500ML-% SOLUTION: Performed by: ORTHOPAEDIC SURGERY

## 2025-04-16 PROCEDURE — 87070 CULTURE OTHR SPECIMN AEROBIC: CPT | Performed by: ORTHOPAEDIC SURGERY

## 2025-04-16 PROCEDURE — 80048 BASIC METABOLIC PNL TOTAL CA: CPT | Performed by: HOSPITALIST

## 2025-04-16 PROCEDURE — 63710000001 INSULIN LISPRO (HUMAN) PER 5 UNITS: Performed by: ORTHOPAEDIC SURGERY

## 2025-04-16 RX ORDER — FENTANYL CITRATE 50 UG/ML
50 INJECTION, SOLUTION INTRAMUSCULAR; INTRAVENOUS
Status: DISCONTINUED | OUTPATIENT
Start: 2025-04-16 | End: 2025-04-16 | Stop reason: HOSPADM

## 2025-04-16 RX ORDER — SODIUM CHLORIDE 0.9 % (FLUSH) 0.9 %
3 SYRINGE (ML) INJECTION AS NEEDED
Status: DISCONTINUED | OUTPATIENT
Start: 2025-04-16 | End: 2025-04-16 | Stop reason: HOSPADM

## 2025-04-16 RX ORDER — SODIUM CHLORIDE 0.9 % (FLUSH) 0.9 %
3-10 SYRINGE (ML) INJECTION AS NEEDED
Status: DISCONTINUED | OUTPATIENT
Start: 2025-04-16 | End: 2025-04-16 | Stop reason: HOSPADM

## 2025-04-16 RX ORDER — LIDOCAINE HYDROCHLORIDE 20 MG/ML
INJECTION, SOLUTION EPIDURAL; INFILTRATION; INTRACAUDAL; PERINEURAL AS NEEDED
Status: DISCONTINUED | OUTPATIENT
Start: 2025-04-16 | End: 2025-04-16 | Stop reason: SURG

## 2025-04-16 RX ORDER — PROPOFOL 10 MG/ML
VIAL (ML) INTRAVENOUS AS NEEDED
Status: DISCONTINUED | OUTPATIENT
Start: 2025-04-16 | End: 2025-04-16 | Stop reason: SURG

## 2025-04-16 RX ORDER — NALOXONE HCL 0.4 MG/ML
0.4 VIAL (ML) INJECTION AS NEEDED
Status: DISCONTINUED | OUTPATIENT
Start: 2025-04-16 | End: 2025-04-16 | Stop reason: HOSPADM

## 2025-04-16 RX ORDER — ACETAMINOPHEN 500 MG
1000 TABLET ORAL ONCE
Status: COMPLETED | OUTPATIENT
Start: 2025-04-16 | End: 2025-04-16

## 2025-04-16 RX ORDER — LABETALOL HYDROCHLORIDE 5 MG/ML
5 INJECTION, SOLUTION INTRAVENOUS
Status: DISCONTINUED | OUTPATIENT
Start: 2025-04-16 | End: 2025-04-16 | Stop reason: HOSPADM

## 2025-04-16 RX ORDER — LIDOCAINE HYDROCHLORIDE 10 MG/ML
0.5 INJECTION, SOLUTION EPIDURAL; INFILTRATION; INTRACAUDAL; PERINEURAL ONCE AS NEEDED
Status: DISCONTINUED | OUTPATIENT
Start: 2025-04-16 | End: 2025-04-16 | Stop reason: HOSPADM

## 2025-04-16 RX ORDER — HYDROCODONE BITARTRATE AND ACETAMINOPHEN 10; 325 MG/1; MG/1
1 TABLET ORAL EVERY 4 HOURS PRN
Status: DISCONTINUED | OUTPATIENT
Start: 2025-04-16 | End: 2025-04-16 | Stop reason: HOSPADM

## 2025-04-16 RX ORDER — ONDANSETRON 2 MG/ML
4 INJECTION INTRAMUSCULAR; INTRAVENOUS ONCE AS NEEDED
Status: DISCONTINUED | OUTPATIENT
Start: 2025-04-16 | End: 2025-04-16 | Stop reason: HOSPADM

## 2025-04-16 RX ORDER — FENTANYL CITRATE 50 UG/ML
INJECTION, SOLUTION INTRAMUSCULAR; INTRAVENOUS AS NEEDED
Status: DISCONTINUED | OUTPATIENT
Start: 2025-04-16 | End: 2025-04-16 | Stop reason: SURG

## 2025-04-16 RX ORDER — ASPIRIN 81 MG/1
81 TABLET, CHEWABLE ORAL ONCE
Status: COMPLETED | OUTPATIENT
Start: 2025-04-16 | End: 2025-04-16

## 2025-04-16 RX ORDER — FLUMAZENIL 0.1 MG/ML
0.2 INJECTION INTRAVENOUS AS NEEDED
Status: DISCONTINUED | OUTPATIENT
Start: 2025-04-16 | End: 2025-04-16 | Stop reason: HOSPADM

## 2025-04-16 RX ORDER — SODIUM CHLORIDE 9 MG/ML
40 INJECTION, SOLUTION INTRAVENOUS AS NEEDED
Status: DISCONTINUED | OUTPATIENT
Start: 2025-04-16 | End: 2025-04-16 | Stop reason: HOSPADM

## 2025-04-16 RX ORDER — MIDAZOLAM HYDROCHLORIDE 2 MG/2ML
1 INJECTION, SOLUTION INTRAMUSCULAR; INTRAVENOUS
Status: DISCONTINUED | OUTPATIENT
Start: 2025-04-16 | End: 2025-04-16 | Stop reason: HOSPADM

## 2025-04-16 RX ORDER — MAGNESIUM HYDROXIDE 1200 MG/15ML
LIQUID ORAL AS NEEDED
Status: DISCONTINUED | OUTPATIENT
Start: 2025-04-16 | End: 2025-04-16 | Stop reason: HOSPADM

## 2025-04-16 RX ORDER — LIDOCAINE HYDROCHLORIDE AND EPINEPHRINE 10; 10 MG/ML; UG/ML
INJECTION, SOLUTION INFILTRATION; PERINEURAL AS NEEDED
Status: DISCONTINUED | OUTPATIENT
Start: 2025-04-16 | End: 2025-04-16 | Stop reason: HOSPADM

## 2025-04-16 RX ORDER — SODIUM CHLORIDE 0.9 % (FLUSH) 0.9 %
3 SYRINGE (ML) INJECTION EVERY 12 HOURS SCHEDULED
Status: DISCONTINUED | OUTPATIENT
Start: 2025-04-16 | End: 2025-04-16 | Stop reason: HOSPADM

## 2025-04-16 RX ORDER — HYDROCODONE BITARTRATE AND ACETAMINOPHEN 5; 325 MG/1; MG/1
1 TABLET ORAL EVERY 4 HOURS PRN
Status: DISCONTINUED | OUTPATIENT
Start: 2025-04-16 | End: 2025-04-16 | Stop reason: HOSPADM

## 2025-04-16 RX ORDER — IBUPROFEN 600 MG/1
600 TABLET, FILM COATED ORAL EVERY 6 HOURS PRN
Status: DISCONTINUED | OUTPATIENT
Start: 2025-04-16 | End: 2025-04-16 | Stop reason: HOSPADM

## 2025-04-16 RX ORDER — VANCOMYCIN 1.75 GRAM/500 ML IN 0.9 % SODIUM CHLORIDE INTRAVENOUS
1750 EVERY 12 HOURS
Status: DISCONTINUED | OUTPATIENT
Start: 2025-04-16 | End: 2025-04-17 | Stop reason: DRUGHIGH

## 2025-04-16 RX ORDER — ONDANSETRON 2 MG/ML
INJECTION INTRAMUSCULAR; INTRAVENOUS AS NEEDED
Status: DISCONTINUED | OUTPATIENT
Start: 2025-04-16 | End: 2025-04-16 | Stop reason: SURG

## 2025-04-16 RX ORDER — SODIUM CHLORIDE, SODIUM LACTATE, POTASSIUM CHLORIDE, CALCIUM CHLORIDE 600; 310; 30; 20 MG/100ML; MG/100ML; MG/100ML; MG/100ML
100 INJECTION, SOLUTION INTRAVENOUS CONTINUOUS
Status: DISPENSED | OUTPATIENT
Start: 2025-04-16 | End: 2025-04-17

## 2025-04-16 RX ORDER — SODIUM CHLORIDE, SODIUM LACTATE, POTASSIUM CHLORIDE, CALCIUM CHLORIDE 600; 310; 30; 20 MG/100ML; MG/100ML; MG/100ML; MG/100ML
100 INJECTION, SOLUTION INTRAVENOUS CONTINUOUS
Status: DISCONTINUED | OUTPATIENT
Start: 2025-04-16 | End: 2025-04-16

## 2025-04-16 RX ORDER — DEXAMETHASONE SODIUM PHOSPHATE 4 MG/ML
INJECTION, SOLUTION INTRA-ARTICULAR; INTRALESIONAL; INTRAMUSCULAR; INTRAVENOUS; SOFT TISSUE AS NEEDED
Status: DISCONTINUED | OUTPATIENT
Start: 2025-04-16 | End: 2025-04-16 | Stop reason: SURG

## 2025-04-16 RX ADMIN — PIPERACILLIN AND TAZOBACTAM 3.38 G: 3; .375 INJECTION, POWDER, FOR SOLUTION INTRAVENOUS; PARENTERAL at 15:17

## 2025-04-16 RX ADMIN — PROPOFOL 200 MG: 10 INJECTION, EMULSION INTRAVENOUS at 16:16

## 2025-04-16 RX ADMIN — Medication 10 ML: at 09:39

## 2025-04-16 RX ADMIN — Medication 1750 MG: at 06:20

## 2025-04-16 RX ADMIN — Medication 10 ML: at 09:40

## 2025-04-16 RX ADMIN — ASPIRIN 81 MG: 81 TABLET, CHEWABLE ORAL at 16:09

## 2025-04-16 RX ADMIN — FENTANYL CITRATE 100 MCG: 50 INJECTION, SOLUTION INTRAMUSCULAR; INTRAVENOUS at 16:16

## 2025-04-16 RX ADMIN — PIPERACILLIN AND TAZOBACTAM 3.38 G: 3; .375 INJECTION, POWDER, FOR SOLUTION INTRAVENOUS; PARENTERAL at 07:33

## 2025-04-16 RX ADMIN — DEXAMETHASONE SODIUM PHOSPHATE 4 MG: 4 INJECTION, SOLUTION INTRA-ARTICULAR; INTRALESIONAL; INTRAMUSCULAR; INTRAVENOUS; SOFT TISSUE at 16:27

## 2025-04-16 RX ADMIN — MORPHINE SULFATE 1 MG: 2 INJECTION, SOLUTION INTRAMUSCULAR; INTRAVENOUS at 05:25

## 2025-04-16 RX ADMIN — ONDANSETRON 4 MG: 2 INJECTION INTRAMUSCULAR; INTRAVENOUS at 16:27

## 2025-04-16 RX ADMIN — Medication 1750 MG: at 18:24

## 2025-04-16 RX ADMIN — OXYCODONE HYDROCHLORIDE AND ACETAMINOPHEN 1 TABLET: 5; 325 TABLET ORAL at 09:39

## 2025-04-16 RX ADMIN — PIPERACILLIN AND TAZOBACTAM 3.38 G: 3; .375 INJECTION, POWDER, FOR SOLUTION INTRAVENOUS; PARENTERAL at 22:01

## 2025-04-16 RX ADMIN — LIDOCAINE HYDROCHLORIDE 80 MG: 20 INJECTION, SOLUTION EPIDURAL; INFILTRATION; INTRACAUDAL; PERINEURAL at 16:16

## 2025-04-16 RX ADMIN — ACETAMINOPHEN 1000 MG: 500 TABLET, FILM COATED ORAL at 16:08

## 2025-04-16 RX ADMIN — MORPHINE SULFATE 1 MG: 2 INJECTION, SOLUTION INTRAMUSCULAR; INTRAVENOUS at 21:43

## 2025-04-16 RX ADMIN — INSULIN LISPRO 12 UNITS: 100 INJECTION, SOLUTION INTRAVENOUS; SUBCUTANEOUS at 21:41

## 2025-04-16 RX ADMIN — Medication 10 ML: at 21:44

## 2025-04-16 RX ADMIN — SODIUM CHLORIDE, POTASSIUM CHLORIDE, SODIUM LACTATE AND CALCIUM CHLORIDE 100 ML/HR: 600; 310; 30; 20 INJECTION, SOLUTION INTRAVENOUS at 15:51

## 2025-04-16 RX ADMIN — OXYCODONE HYDROCHLORIDE AND ACETAMINOPHEN 1 TABLET: 5; 325 TABLET ORAL at 00:32

## 2025-04-16 NOTE — PLAN OF CARE
Goal Outcome Evaluation:  Plan of Care Reviewed With: patient        Progress: no change     Pt went down for procedure and returned with dressing to right index finger. Meds given per MAR. Safety measures in place.

## 2025-04-16 NOTE — PROGRESS NOTES
"Pharmacy Dosing Service  Pharmacokinetics  Vancomycin Follow-up Evaluation    Assessment/Action/Plan:  Current Order: Vancomycin 1750 mg IVPB every 12 hours  Current end date:04/21/25  Levels: 04/16 - 5.80  Additional antimicrobial agent(s): Zosyn    Increase dose to 1750 mg every 12 hours. Pharmacy will continue to follow daily and adjust dose accordingly.     Subjective:  James Mar is a 49 y.o. male currently on Vancomycin 1750 mg IV every 12 hours for the treatment of bone and/or joint infection, day 3 of 8 of treatment.    AUC Model Data:  Loading dose: N/A  Regimen: 1750 mg IV every 12 hours.  Start time: 06:57 on 04/16/2025  Exposure target: AUC24 (range)400-600 mg/L.hr   AUC24,ss: 491 mg/L.hr  Probability of AUC24 > 400: 80 %  Ctrough,ss: 7.8 mg/L  Probability of Ctrough,ss > 20: 0 %  Probability of nephrotoxicity (Lodise GEOVANY 2009): 4 %    Objective:  Ht: 185.4 cm (73\"); Wt: 130 kg (286 lb 8 oz)  Estimated Creatinine Clearance: 152.3 mL/min (by C-G formula based on SCr of 0.82 mg/dL).   Creatinine   Date Value Ref Range Status   04/16/2025 0.82 0.76 - 1.27 mg/dL Final   04/15/2025 1.07 0.76 - 1.27 mg/dL Final   04/14/2025 1.00 0.76 - 1.27 mg/dL Final   02/23/2025 0.9 0.7 - 1.2 mg/dL Final   06/11/2024 0.8 0.5 - 1.2 mg/dL Final      Lab Results   Component Value Date    WBC 6.47 04/16/2025    WBC 7.36 04/15/2025    WBC 8.17 04/14/2025         Lab Results   Component Value Date    VANCOTROUGH 5.80 04/16/2025       Culture Results:  Microbiology Results (last 10 days)       Procedure Component Value - Date/Time    MRSA Screen, PCR (Inpatient) - Swab, Nares [[id number]]  (Abnormal) Collected: 04/14/25 2039    Lab Status: Final result Specimen: Swab from Nares Updated: 04/14/25 2233     MRSA PCR MRSA Detected    Narrative:      The negative predictive value of this diagnostic test is high and should only be used to consider de-escalating anti-MRSA therapy. A positive result may indicate colonization with " MRSA and must be correlated clinically.    Blood Culture - Blood, Arm, Left [[id number]]  (Normal) Collected: 04/14/25 1544    Lab Status: Preliminary result Specimen: Blood from Arm, Left Updated: 04/15/25 1600     Blood Culture No growth at 24 hours    Blood Culture - Blood, Hand, Left [[id number]]  (Normal) Collected: 04/14/25 1452    Lab Status: Preliminary result Specimen: Blood from Hand, Left Updated: 04/15/25 1600     Blood Culture No growth at 24 hours            Maged Bueno, PharmGUERDA   04/16/25 06:01 CDT

## 2025-04-16 NOTE — PLAN OF CARE
Goal Outcome Evaluation:  Plan of Care Reviewed With: patient      Patient c/o pain in right index finger, PRN pain meds given. Patient denies nausea. A&O x4. IV Zosyn and Vancomycin given per order. Patient NPO since midnight. Continuous pulse ox in use. SCDs on. Tele on, sinus rhythm.

## 2025-04-16 NOTE — ANESTHESIA PREPROCEDURE EVALUATION
Anesthesia Evaluation     no history of anesthetic complications:   NPO Solid Status: > 8 hours  NPO Liquid Status: > 8 hours           Airway   Mallampati: I  No difficulty expected  Dental      Pulmonary    (+) a smoker Current,  (-) sleep apnea  Cardiovascular   Exercise tolerance: good (4-7 METS)    (+) hypertension, past MI , CAD, cardiac stents (1 stent placed 12/2023) more than 12 months ago , DVT resolved, hyperlipidemia      Neuro/Psych  (-) seizures, TIA, CVA  GI/Hepatic/Renal/Endo    (+) diabetes mellitus type 2  (-) liver disease, no renal disease    Musculoskeletal     Abdominal    Substance History      OB/GYN          Other                          Anesthesia Plan    ASA 3     general     (Asa in preop)  intravenous induction     Anesthetic plan, risks, benefits, and alternatives have been provided, discussed and informed consent has been obtained with: patient.        CODE STATUS:    Code Status (Patient has no pulse and is not breathing): CPR (Attempt to Resuscitate)  Medical Interventions (Patient has pulse or is breathing): Full Support

## 2025-04-16 NOTE — OP NOTE
Patient Name: Jovita  : 1975  MRN: [id number]    DATE of SURGERY: 2025    SURGEON: Luis Miguel Sahni MD    ASSISTANT: NONE    PREOPERATIVE DIAGNOSES:   1.  Right index finger paronychial/eponychial infection      POSTOPERATIVE DIAGNOSES:   1.  Right index finger paronychial/eponychial and hyponychial/felon infection     PROCEDURE PERFORMED:   1.  Right index finger irrigation and excisional debridement of paronychial/eponychial/hyponychial infection      IMPLANTS  None.      ANESTHESIA    General anesthesia with local anesthetic      OPERATIVE INDICATIONS    Mr. Mar is a 49-year-old gentleman who was admitted through the ER with a progressively worsening right index finger infection which developed over the course of approximately 1 week.  MRI was concerning for development of osteomyelitis.  He had clear subcutaneous purulence along the radial paronychial skin fold and eponychial skin fold.  Despite antibiotics, he had enlargement of the subcutaneous purulence.  We recommended operative intervention.  I discussed with him preoperatively options for incision and drainage versus partial amputation through the DIP joint.  Ultimately, he wishes to proceed with incision and drainage with attempts at preserving the distal phalanx.  Risks including, but not limited to, bleeding, ongoing infection, need for additional procedures, pain, injury to surrounding structures with subsequent finger/hand dysfunction, stiffness, weakness and adhesions were all discussed.  All questions were answered preoperatively.  Written and informed consent were obtained.     ESTIMATED BLOOD LOSS    Less than 10 mL.      SPECIMENS  1.  Fluid specimen sent for microbiologic assessment      DRAINS  None.      COMPLICATIONS    None.      PROCEDURE IN DETAIL  The patient was seen in the preoperative holding room where the correct patient, procedure and side were confirmed.  The operative site was marked by myself with the  patient's agreement.  The consent was signed by myself. The patient was transported to the operating room, where a timeout was performed, identifying the correct patient, procedure and operative site.  Dose appropriate antibiotics were given as perioperative antibiotics.  A nonsterile tourniquet was applied to the operative brachium.  The operative extremity was prepped and draped in a normal sterile fashion.  Right upper extremity was elevated during prepping and draping.  Tourniquet was inflated to 250 mmHg for less than 30 minutes.  Tourniquet was not utilized.  Procedure was initiated with blunt dissection with a [place] elevator along the radial paronychial skin fold along the lateral portion of the nail plate.  With blunt dissection, gross purulence was encountered.  Fluid specimen was obtained for microbiologic assessment.  On further dissection, the epidermis was elevated away from underlying dermal tissue and removed with pickups and scissors.  After removal of the epidermis, there was a circular area approximately 5 x 5 mm of fibrous tissue with loss of skin substance along the radial aspect of the hyponychium which was sharply resected with [place] pickups, scissors and a 15 blade scalpel.  After removal, blunt dissection was carried down directly to the distal phalanx.  Blunt dissection was utilized to break up any additional loculations.  I felt that adequate decompression of the subcutaneous abscess was performed and deeper dissection was carried through the area void of soft tissue.  At this point, thorough irrigation utilizing a combination of normal saline, Betadine and hydrogen peroxide was performed.  During irrigation, excisional debridement of the skin, subcutaneous tissue and bone were performed utilizing [place] pickups, scissors, rongeur and 15 blade scalpel approximating an area of 2 cm².  After thorough irrigation, skin was loosely reapproximated with chromic suture.  The nail plate was left  intact.  For postoperative pain control, a right index finger digital block was performed with 10 cc of 1% lidocaine with epinephrine.  Soft dressing was applied to the right index finger.  General anesthesia was reversed, tourniquet was deflated and he was taken to PACU in stable condition.  All counts at the end of procedure were correct.  He tolerated the procedure well and was without intraoperative complication.        POSTOPERATIVE PLAN    1.  Return to floor  2.  Continue IV antibiotics  3.  Pain control  4.  Activity as tolerated, elevate right upper extremity

## 2025-04-16 NOTE — ANESTHESIA PROCEDURE NOTES
Airway  Reason: elective    Date/Time: 4/16/2025 4:17 PM  Airway not difficult    General Information and Staff    Patient location during procedure: OR  CRNA/CAA: Lucila Joyner CRNA    Indications and Patient Condition  Indications for airway management: airway protection    Preoxygenated: yes    Mask difficulty assessment: 0 - not attempted    Final Airway Details    Final airway type: supraglottic airway      Successful airway: I-gel  Size: 4   Number of attempts at approach: 1  Assessment: lips, teeth, and gum same as pre-op

## 2025-04-16 NOTE — PROGRESS NOTES
"Infectious Diseases Progress Note    Patient:  James Mar  YOB: 1975  MRN: [id number]   Admit date: 2025   Admitting Physician: Ashanti Whatley MD  Primary Care Physician: Radha Mortensen APRN    Chief Complaint/Interval History: He is without new symptoms.  No new localizing complaints.  On exam blistered area on finger seems a little bit larger.  He feels there is been a slight increase in size as well.  No fevers or chills.  No diarrhea or rash with antibiotic treatment.    Intake/Output Summary (Last 24 hours) at 2025 1148  Last data filed at 4/15/2025 1333  Gross per 24 hour   Intake 360 ml   Output --   Net 360 ml     Allergies: No Known Allergies  Current Scheduled Medications:   insulin lispro, 4-24 Units, Subcutaneous, 4x Daily AC & at Bedtime  piperacillin-tazobactam, 3.375 g, Intravenous, Q8H  sodium chloride, 10 mL, Intravenous, Q12H  vancomycin, 1,750 mg, Intravenous, Q12H          Current PRN Medications:    acetaminophen **OR** acetaminophen **OR** acetaminophen    senna-docusate sodium **AND** polyethylene glycol **AND** bisacodyl **AND** bisacodyl    Calcium Replacement - Follow Nurse / BPA Driven Protocol    dextrose    dextrose    glucagon (human recombinant)    Magnesium Standard Dose Replacement - Follow Nurse / BPA Driven Protocol    Morphine **AND** naloxone    ondansetron    oxyCODONE-acetaminophen    Phosphorus Replacement - Follow Nurse / BPA Driven Protocol    Potassium Replacement - Follow Nurse / BPA Driven Protocol    [COMPLETED] Insert Peripheral IV **AND** sodium chloride    sodium chloride    sodium chloride    Review of Systems see HPI    Vital Signs:  Temp (24hrs), Av.8 °F (36.6 °C), Min:97.7 °F (36.5 °C), Max:98.1 °F (36.7 °C)    /70 (BP Location: Left arm)   Pulse 85   Temp 97.8 °F (36.6 °C) (Oral)   Resp 18   Ht 185.4 cm (73\")   Wt 130 kg (286 lb 8 oz)   SpO2 97%   BMI 37.80 kg/m²     Physical Exam  Vital signs - " reviewed.  Line/IV site - No erythema, warmth, induration, or tenderness.  Right index finger slightly larger area of blistering/swelling distally  Mild erythema  .0    Lab Results:  CBC:   Results from last 7 days   Lab Units 04/16/25  0456 04/15/25  0235 04/14/25  1707 04/14/25  1452   WBC 10*3/mm3 6.47 7.36 8.17 8.11   HEMOGLOBIN g/dL 14.8 14.4 15.1 15.4   HEMATOCRIT % 45.1 42.8 45.1 45.9   PLATELETS 10*3/mm3 260 282 276 282     BMP:  Results from last 7 days   Lab Units 04/16/25  0456 04/15/25  0235 04/14/25  1707 04/14/25  1452   SODIUM mmol/L 135* 137 136 135*  135*   POTASSIUM mmol/L 4.4 4.1 3.8 4.0  4.0   CHLORIDE mmol/L 98 105 100 97*  97*   CO2 mmol/L 24.0 21.0* 21.0* 18.0*  21.0*   BUN mg/dL 10 14 16 17  16   CREATININE mg/dL 0.82 1.07 1.00 1.22  1.20   GLUCOSE mg/dL 273* 208* 245* 331*  327*   CALCIUM mg/dL 9.1 8.8 9.3 9.9  9.8   ALT (SGPT) U/L  --   --   --  32     Culture Results:   Blood Culture   Date Value Ref Range Status   04/14/2025 No growth at 24 hours  Preliminary   04/14/2025 No growth at 24 hours  Preliminary     Radiology: None  Additional Studies Reviewed: None    Impression:   Swelling/discoloration distal aspect right index finger.  This may have started as a paronychia.  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    Recommendations:   Continue broad antibiotic treatment vancomycin and piperacillin/tazobactam  Probable surgical irrigation/debridement/deep cultures today  Await culture results  Monitor clinical course    Jimmy Liang MD
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
## 2025-04-17 LAB
ANION GAP SERPL CALCULATED.3IONS-SCNC: 14 MMOL/L (ref 5–15)
BUN SERPL-MCNC: 14 MG/DL (ref 6–20)
BUN/CREAT SERPL: 16.5 (ref 7–25)
CALCIUM SPEC-SCNC: 9.3 MG/DL (ref 8.6–10.5)
CHLORIDE SERPL-SCNC: 99 MMOL/L (ref 98–107)
CO2 SERPL-SCNC: 23 MMOL/L (ref 22–29)
CREAT SERPL-MCNC: 0.85 MG/DL (ref 0.76–1.27)
EGFRCR SERPLBLD CKD-EPI 2021: 105.9 ML/MIN/1.73
GLUCOSE BLDC GLUCOMTR-MCNC: 263 MG/DL (ref 70–130)
GLUCOSE BLDC GLUCOMTR-MCNC: 276 MG/DL (ref 70–130)
GLUCOSE BLDC GLUCOMTR-MCNC: 374 MG/DL (ref 70–130)
GLUCOSE BLDC GLUCOMTR-MCNC: 428 MG/DL (ref 70–130)
GLUCOSE SERPL-MCNC: 299 MG/DL (ref 65–99)
POTASSIUM SERPL-SCNC: 4.9 MMOL/L (ref 3.5–5.2)
SODIUM SERPL-SCNC: 136 MMOL/L (ref 136–145)
VANCOMYCIN TROUGH SERPL-MCNC: 10 MCG/ML (ref 5–20)

## 2025-04-17 PROCEDURE — 25810000003 LACTATED RINGERS PER 1000 ML: Performed by: ORTHOPAEDIC SURGERY

## 2025-04-17 PROCEDURE — 82948 REAGENT STRIP/BLOOD GLUCOSE: CPT

## 2025-04-17 PROCEDURE — 25010000002 VANCOMYCIN 2-0.9 GM/500ML-% SOLUTION: Performed by: ORTHOPAEDIC SURGERY

## 2025-04-17 PROCEDURE — 63710000001 INSULIN LISPRO (HUMAN) PER 5 UNITS: Performed by: ORTHOPAEDIC SURGERY

## 2025-04-17 PROCEDURE — 63710000001 INSULIN GLARGINE PER 5 UNITS: Performed by: HOSPITALIST

## 2025-04-17 PROCEDURE — 99232 SBSQ HOSP IP/OBS MODERATE 35: CPT | Performed by: INTERNAL MEDICINE

## 2025-04-17 PROCEDURE — 25010000002 MORPHINE PER 10 MG: Performed by: ORTHOPAEDIC SURGERY

## 2025-04-17 PROCEDURE — 25010000002 PIPERACILLIN SOD-TAZOBACTAM PER 1 G: Performed by: ORTHOPAEDIC SURGERY

## 2025-04-17 PROCEDURE — 80048 BASIC METABOLIC PNL TOTAL CA: CPT | Performed by: HOSPITALIST

## 2025-04-17 PROCEDURE — 80202 ASSAY OF VANCOMYCIN: CPT | Performed by: ORTHOPAEDIC SURGERY

## 2025-04-17 RX ORDER — VANCOMYCIN 2 GRAM/500 ML IN 0.9 % SODIUM CHLORIDE INTRAVENOUS
2000 EVERY 12 HOURS SCHEDULED
Status: DISCONTINUED | OUTPATIENT
Start: 2025-04-17 | End: 2025-04-19

## 2025-04-17 RX ADMIN — OXYCODONE HYDROCHLORIDE AND ACETAMINOPHEN 1 TABLET: 5; 325 TABLET ORAL at 10:19

## 2025-04-17 RX ADMIN — INSULIN LISPRO 8 UNITS: 100 INJECTION, SOLUTION INTRAVENOUS; SUBCUTANEOUS at 12:38

## 2025-04-17 RX ADMIN — INSULIN LISPRO 24 UNITS: 100 INJECTION, SOLUTION INTRAVENOUS; SUBCUTANEOUS at 09:13

## 2025-04-17 RX ADMIN — Medication 2000 MG: at 06:46

## 2025-04-17 RX ADMIN — Medication 10 ML: at 21:21

## 2025-04-17 RX ADMIN — PIPERACILLIN AND TAZOBACTAM 3.38 G: 3; .375 INJECTION, POWDER, FOR SOLUTION INTRAVENOUS; PARENTERAL at 23:30

## 2025-04-17 RX ADMIN — SODIUM CHLORIDE, POTASSIUM CHLORIDE, SODIUM LACTATE AND CALCIUM CHLORIDE 100 ML/HR: 600; 310; 30; 20 INJECTION, SOLUTION INTRAVENOUS at 00:24

## 2025-04-17 RX ADMIN — Medication 2000 MG: at 17:42

## 2025-04-17 RX ADMIN — PIPERACILLIN AND TAZOBACTAM 3.38 G: 3; .375 INJECTION, POWDER, FOR SOLUTION INTRAVENOUS; PARENTERAL at 06:46

## 2025-04-17 RX ADMIN — SODIUM CHLORIDE, POTASSIUM CHLORIDE, SODIUM LACTATE AND CALCIUM CHLORIDE 100 ML/HR: 600; 310; 30; 20 INJECTION, SOLUTION INTRAVENOUS at 13:44

## 2025-04-17 RX ADMIN — MORPHINE SULFATE 1 MG: 2 INJECTION, SOLUTION INTRAMUSCULAR; INTRAVENOUS at 08:59

## 2025-04-17 RX ADMIN — PIPERACILLIN AND TAZOBACTAM 3.38 G: 3; .375 INJECTION, POWDER, FOR SOLUTION INTRAVENOUS; PARENTERAL at 15:57

## 2025-04-17 RX ADMIN — MORPHINE SULFATE 1 MG: 2 INJECTION, SOLUTION INTRAMUSCULAR; INTRAVENOUS at 01:58

## 2025-04-17 RX ADMIN — INSULIN LISPRO 20 UNITS: 100 INJECTION, SOLUTION INTRAVENOUS; SUBCUTANEOUS at 21:20

## 2025-04-17 RX ADMIN — INSULIN GLARGINE 15 UNITS: 100 INJECTION, SOLUTION SUBCUTANEOUS at 21:21

## 2025-04-17 RX ADMIN — OXYCODONE HYDROCHLORIDE AND ACETAMINOPHEN 1 TABLET: 5; 325 TABLET ORAL at 00:23

## 2025-04-17 RX ADMIN — OXYCODONE HYDROCHLORIDE AND ACETAMINOPHEN 1 TABLET: 5; 325 TABLET ORAL at 21:20

## 2025-04-17 RX ADMIN — INSULIN GLARGINE 15 UNITS: 100 INJECTION, SOLUTION SUBCUTANEOUS at 10:43

## 2025-04-17 RX ADMIN — INSULIN LISPRO 12 UNITS: 100 INJECTION, SOLUTION INTRAVENOUS; SUBCUTANEOUS at 17:42

## 2025-04-17 RX ADMIN — MORPHINE SULFATE 1 MG: 2 INJECTION, SOLUTION INTRAMUSCULAR; INTRAVENOUS at 17:52

## 2025-04-17 NOTE — CONSULTS
Cardiac Rehab MI/PTCA/Stent education packet was sent to the patient's address on record. Handouts titled; \"Home Instructions Following a Cardiac Event\", \"A Healthy Heart: Care Instructions\",  \"Cardiac Diet/Low Cholesterol\", \"Cardiac Rehabilitation: An Individualized Supervised Program For You\" and a [facility] Cardiac & Pulmonary Rehabilitation brochure were included. Patient was instructed to contact [facility] or [facility] to enroll in Phase II Outpatient Cardiac Rehab. No

## 2025-04-17 NOTE — PROGRESS NOTES
[facility] Medicine Services  INPATIENT PROGRESS NOTE    Patient Name: James Mar  Date of Admission: 4/14/2025  Today's Date: 04/17/25  Length of Stay: 3  Primary Care Physician: Radha Mortensen APRN    Subjective   Primary Historian: patient      Chief Complaint: R hand erythema edema drainage      Wound Infection  Abscess     49-year-old male with PMH of morbid obesity ? MARYBEL. DM  , HTN , CAD s/p stenting who states that he has an infection of his right index finger. He states that has been going on for about a week and getting worse. He states that he is not able to bend his finger anymore. He states that the swelling and redness is getting worse. He is on Bactrim. But no improvement of his symptoms. He has tried to drain it himself with no significant amount of drainage. Patient denies any other symptoms at this time   In ED wbc -ve, R hand xray ? Osteo 2nd distal phalanx, AG elevated ED did not feel the patient is in DKA, BC obtained started on vanco, will adm,it continue zosyn, vanco, SSI high, IVF check A1c, consult ID, ortho, scds for DVT prophylaxis, identify reconcile restart home meds once reconciled  4/15  For history of morbid obesity questionable sleep apnea history of diabetes hypertension coronary artery disease status post stenting A1c is 10.7 presented with the worsening of right hand infection questionable osteomyelitis of the second distal phalanx placed on vancomycin and Zosyn ID consulted and so was orthopedic surgery Recommending MRI to assess for potential deeper infection and more proximal involvement.  Will plan for incision and drainage of right index finger tomorrow.  Was having bradycardia asymptomatic at night during sleep strongly suspect sleep apnea check his TSH MRSA screen was positive  Blood cultures remain pending his anion gap has closed we did not need any DKA protocol and his blood sugar is much better educated that he needs to be  started on insulin  4/16  As before MRI of the right hand showing Findings suspicious for osteomyelitis of the distal second phalanx  with marrow edema and low T1 signal in the distal phalanx as well as  extensive adjacent soft tissue swelling and edema appreciate ID remains on Vanco and Zosyn appreciate Ortho possible OR today  4/17  Postop day #1 status post I&D of right index finger paronychial/eponychial/hyponychial infection as before A1c 10.7 blood sugar has been elevated started him on Lantus appreciate  ID remains on Zosyn vancomycin awaiting cultures sensitivities  Review of Systems   All other systems reviewed and are negative.     All pertinent negatives and positives are as above. All other systems have been reviewed and are negative unless otherwise stated.     Objective    Temp:  [97.8 °F (36.6 °C)-98.8 °F (37.1 °C)] 98 °F (36.7 °C)  Heart Rate:  [77-94] 88  Resp:  [14-18] 18  BP: (100-135)/(52-84) 135/84  Physical Exam  HENT:      Head: Normocephalic.      Nose: Nose normal.   Eyes:      Pupils: Pupils are equal, round, and reactive to light.   Cardiovascular:      Rate and Rhythm: Normal rate.   Pulmonary:      Breath sounds: Wheezing present.   Abdominal:      General: Abdomen is flat.   Musculoskeletal:      Cervical back: Normal range of motion.      Comments: Right hand cellulitis erythema edema   Neurological:      Mental Status: He is alert.             Results Review:  I have reviewed the labs, radiology results, and diagnostic studies.    Laboratory Data:   Results from last 7 days   Lab Units 04/16/25  0456 04/15/25  0235 04/14/25  1707   WBC 10*3/mm3 6.47 7.36 8.17   HEMOGLOBIN g/dL 14.8 14.4 15.1   HEMATOCRIT % 45.1 42.8 45.1   PLATELETS 10*3/mm3 260 282 276        Results from last 7 days   Lab Units 04/16/25  0456 04/15/25  0235 04/14/25  1707 04/14/25  1452   SODIUM mmol/L 135* 137 136 135*  135*   POTASSIUM mmol/L 4.4 4.1 3.8 4.0  4.0   CHLORIDE mmol/L 98 105 100 97*  97*   CO2  "mmol/L 24.0 21.0* 21.0* 18.0*  21.0*   BUN mg/dL 10 14 16 17  16   CREATININE mg/dL 0.82 1.07 1.00 1.22  1.20   CALCIUM mg/dL 9.1 8.8 9.3 9.9  9.8   BILIRUBIN mg/dL  --   --   --  0.3   ALK PHOS U/L  --   --   --  68   ALT (SGPT) U/L  --   --   --  32   AST (SGOT) U/L  --   --   --  26   GLUCOSE mg/dL 273* 208* 245* 331*  327*       Culture Data:   No results found for: \"BLOODCX\", \"URINECX\", \"WOUNDCX\", \"MRSACX\", \"RESPCX\", \"STOOLCX\"    Radiology Data:   Imaging Results (Last 24 Hours)       ** No results found for the last 24 hours. **            I have reviewed the patient's current medications.     Assessment/Plan   Assessment  Active Hospital Problems    Diagnosis     Acute paronychia of finger of right hand        Treatment Plan  PMH of morbid obesity ? MARYBEL. DM  , HTN who states that he has an infection of his right index finger. He states that has been going on for about a week and getting worse. He states that he is not able to bend his finger anymore. He states that the swelling and redness is getting worse. He is on Bactrim. But no improvement of his symptoms. He has tried to drain it himself with no significant amount of drainage. Patient denies any other symptoms at this time   In ED wbc -ve, R hand xray ? Osteo 2nd distal phalanx, AG elevated ED did not feel the patient is in DKA, BC obtained started on vanco, will adm,it continue zosyn, vanco, SSI high, IVF check A1c, consult ID, ortho, scds for DVT prophylaxis, identify reconcile restart home meds once reconciled eamarisol Nelson.      Medical Decision Making  Number and Complexity of problems: 3 acute   Differential Diagnosis: as above      Conditions and Status        Condition is at treatment goal.     [facility] Data  External documents reviewed: yes  Cardiac tracing (EKG, telemetry) interpretation: yes  Radiology interpretation: yes  Labs reviewed: yes  Any tests that were considered but not ordered: no     Decision rules/scores evaluated (example " [id number], Wells, etc): no     Discussed with: ED     Care Planning  Shared decision making: Patient apprised of current labs, vitals, imaging and treatment plan.  They are agreeable with proceeding with plans as discussed.   Code status and discussions: full      Disposition  Social Determinants of Health that impact treatment or disposition: no  Estimated length of stay is TBD.      I confirmed that the patient's advanced care plan is present, code status is documented, and a surrogate decision maker is listed in the patient's medical record        Electronically signed by Ashanti Whatley MD, 04/17/25, 10:06 CDT.

## 2025-04-17 NOTE — PROGRESS NOTES
"Pharmacy Dosing Service  Pharmacokinetics  Vancomycin Follow-up Evaluation    Assessment/Action/Plan:  Current Order: Vancomycin 2,000 mg IVPB every 12 hours (adjusted from 1,750 mg every 12 hours)  Current end date/final dose: 4/21/25 at 1800  Additional antimicrobial agent(s): Zosyn    Vancomycin trough = 10 (~10.62 hours post dose). Increased dose from 1,750 mg every 12 hours to 2,000 mg every 12 hours. Pharmacy will continue to follow daily and adjust dose accordingly.     Subjective:  James Mar is a 49 y.o. male currently on Vancomycin 2,000 mg IV every 12 hours for the treatment of Bone/Joint Infection (Osteomyelitis of right index finger)(I&D done 4/16/25), day 4 of 7 of treatment.    AUC Model Data:  Regimen: 2000 mg IV every 12 hours.  Start time: 06:24 on 04/17/2025  Exposure target: AUC24 (range)400-600 mg/L.hr   AUC24,ss: 485 mg/L.hr  Probability of AUC24 > 400: 90 %  Ctrough,ss: 11.2 mg/L  Probability of Ctrough,ss > 20: 0 %  Probability of nephrotoxicity (Lodise GEOVANY 2009): 7 %    Objective:  Ht: 185.4 cm (73\"); Wt: 130 kg (286 lb 8 oz)  Estimated Creatinine Clearance: 152.3 mL/min (by C-G formula based on SCr of 0.82 mg/dL).   Creatinine   Date Value Ref Range Status   04/16/2025 0.82 0.76 - 1.27 mg/dL Final   04/15/2025 1.07 0.76 - 1.27 mg/dL Final   04/14/2025 1.00 0.76 - 1.27 mg/dL Final      Lab Results   Component Value Date    WBC 6.47 04/16/2025    WBC 7.36 04/15/2025    WBC 8.17 04/14/2025         Lab Results   Component Value Date    VANCOTROUGH 10.00 04/17/2025       Culture Results:  Microbiology Results (last 10 days)       Procedure Component Value - Date/Time    Wound Culture - Surgical Site, Hand, Digit Right [[id number]] Collected: 04/16/25 1637    Lab Status: Preliminary result Specimen: Surgical Site from Hand, Digit Right Updated: 04/17/25 0526     Gram Stain Rare (1+) WBCs per low power field      Few (2+) Gram positive cocci    MRSA Screen, PCR (Inpatient) - Swab, Nares " [[id number]]  (Abnormal) Collected: 04/14/25 2039    Lab Status: Final result Specimen: Swab from Nares Updated: 04/14/25 2233     MRSA PCR MRSA Detected    Narrative:      The negative predictive value of this diagnostic test is high and should only be used to consider de-escalating anti-MRSA therapy. A positive result may indicate colonization with MRSA and must be correlated clinically.    Blood Culture - Blood, Arm, Left [[id number]]  (Normal) Collected: 04/14/25 1544    Lab Status: Preliminary result Specimen: Blood from Arm, Left Updated: 04/16/25 1600     Blood Culture No growth at 2 days    Blood Culture - Blood, Hand, Left [[id number]]  (Normal) Collected: 04/14/25 1452    Lab Status: Preliminary result Specimen: Blood from Hand, Left Updated: 04/16/25 1600     Blood Culture No growth at 2 days            Jon Monge, PharmD   04/17/25 06:11 CDT

## 2025-04-17 NOTE — PROGRESS NOTES
"Infectious Diseases Progress Note    Patient:  James Mar  YOB: 1975  MRN: [id number]   Admit date: 2025   Admitting Physician: Ashanti Whatley MD  Primary Care Physician: Radha Mortensen APRN    Chief Complaint/Interval History: He has expected level of postoperative discomfort.  Operative note reviewed.  Cultures remain pending.  Tolerating antibiotic treatment without side effect.  No diarrhea or rash.    Intake/Output Summary (Last 24 hours) at 2025 1207  Last data filed at 2025 0923  Gross per 24 hour   Intake 2482 ml   Output --   Net 2482 ml     Allergies: No Known Allergies  Current Scheduled Medications:   insulin glargine, 15 Units, Subcutaneous, Q12H  insulin lispro, 4-24 Units, Subcutaneous, 4x Daily AC & at Bedtime  piperacillin-tazobactam, 3.375 g, Intravenous, Q8H  sodium chloride, 10 mL, Intravenous, Q12H  vancomycin, 2,000 mg, Intravenous, Q12H      lactated ringers, 100 mL/hr, Last Rate: 100 mL/hr (25 0024)       Current PRN Medications:    acetaminophen **OR** acetaminophen **OR** acetaminophen    senna-docusate sodium **AND** polyethylene glycol **AND** bisacodyl **AND** bisacodyl    Calcium Replacement - Follow Nurse / BPA Driven Protocol    dextrose    dextrose    glucagon (human recombinant)    Magnesium Standard Dose Replacement - Follow Nurse / BPA Driven Protocol    Morphine **AND** naloxone    ondansetron    oxyCODONE-acetaminophen    Phosphorus Replacement - Follow Nurse / BPA Driven Protocol    Potassium Replacement - Follow Nurse / BPA Driven Protocol    [COMPLETED] Insert Peripheral IV **AND** sodium chloride    sodium chloride    sodium chloride    Review of Systems see HPI    Vital Signs:  Temp (24hrs), Av.3 °F (36.8 °C), Min:97.9 °F (36.6 °C), Max:98.8 °F (37.1 °C)    /84 (BP Location: Left arm, Patient Position: Lying)   Pulse 88   Temp 98 °F (36.7 °C) (Oral)   Resp 18   Ht 185.4 cm (73\")   Wt 130 kg (286 lb 8 oz)   " SpO2 98%   BMI 37.80 kg/m²     Physical Exam  Vital signs - reviewed.  Line/IV (peripheral) site - No erythema, warmth, induration, or tenderness.  Right index finger dressing in place  Comfortable appearing  No new findings on exam    Lab Results:  CBC:   Results from last 7 days   Lab Units 04/16/25  0456 04/15/25  0235 04/14/25  1707 04/14/25  1452   WBC 10*3/mm3 6.47 7.36 8.17 8.11   HEMOGLOBIN g/dL 14.8 14.4 15.1 15.4   HEMATOCRIT % 45.1 42.8 45.1 45.9   PLATELETS 10*3/mm3 260 282 276 282     BMP:  Results from last 7 days   Lab Units 04/17/25  0501 04/16/25  0456 04/15/25  0235 04/14/25  1707 04/14/25  1452   SODIUM mmol/L 136 135* 137 136 135*  135*   POTASSIUM mmol/L 4.9 4.4 4.1 3.8 4.0  4.0   CHLORIDE mmol/L 99 98 105 100 97*  97*   CO2 mmol/L 23.0 24.0 21.0* 21.0* 18.0*  21.0*   BUN mg/dL 14 10 14 16 17  16   CREATININE mg/dL 0.85 0.82 1.07 1.00 1.22  1.20   GLUCOSE mg/dL 299* 273* 208* 245* 331*  327*   CALCIUM mg/dL 9.3 9.1 8.8 9.3 9.9  9.8   ALT (SGPT) U/L  --   --   --   --  32     Culture Results:   Blood Culture   Date Value Ref Range Status   04/14/2025 No growth at 2 days  Preliminary   04/14/2025 No growth at 2 days  Preliminary     Radiology: None  Additional Studies Reviewed: None    Impression:   Swelling/discoloration distal aspect of right index finger.  Had paronychia/soft tissue abscess-possible foreign body.  He has had irrigation/exploration/debridement.  Seems to be improving with debridement and antibiotic treatment.    Recommendations:   Continue vancomycin and piperacillin/tazobactam today  Feel he can be transition to oral antibiotic treatment on April 18  Direct oral antibiotic treatment against any pathogen recovered from culture  If cultures negative would suggest completing a course of empiric antibiotic treatment  If cultures negative will suggest the following:  Levofloxacin 500 mg orally daily for 5 days  Clindamycin 600 mg orally every 8 hours for 5 days  Would like  to see him in follow-up on Wednesday, April 23 at 12:45 PM    Jimmy Liang MD

## 2025-04-17 NOTE — PROGRESS NOTES
Orthopedic Surgery Progress Note    James Mar  4/17/2025      Subjective:     No acute events overnight.  Reports ongoing, stable pain but improved motion.  Denies any other joint pain, fever or chills.    Objective:     Patient Vitals for the past 24 hrs:   BP Temp Temp src Pulse Resp SpO2   04/17/25 1523 117/71 98.2 °F (36.8 °C) Oral 82 16 99 %   04/17/25 0734 135/84 98 °F (36.7 °C) Oral 88 18 98 %   04/17/25 0506 120/78 97.9 °F (36.6 °C) Oral 94 14 95 %   04/17/25 0055 104/52 98.8 °F (37.1 °C) Oral 92 14 93 %   04/16/25 1947 120/69 98.1 °F (36.7 °C) Axillary 81 14 95 %   04/16/25 1745 -- -- -- -- 14 --   04/16/25 1730 124/68 -- -- 77 14 98 %   04/16/25 1715 116/77 -- -- 78 14 99 %   04/16/25 1710 -- -- -- -- 14 --   04/16/25 1705 100/78 -- -- 83 14 99 %   04/16/25 1701 101/70 98.5 °F (36.9 °C) Temporal 83 14 99 %       General: Awake, alert  Respiratory: Nonlabored  CV: Regular rate  Extremity: RUE: Dressings in place, clean, dry and intact.  Gentle flexion improved from preoperative status.  Stable neurovascular exam of the remainder of the right hand.      Data Review:  Lab Results (last 24 hours)       Procedure Component Value Units Date/Time    Blood Culture - Blood, Arm, Left [[id number]]  (Normal) Collected: 04/14/25 1544    Specimen: Blood from Arm, Left Updated: 04/17/25 1600     Blood Culture No growth at 3 days    Blood Culture - Blood, Hand, Left [[id number]]  (Normal) Collected: 04/14/25 1452    Specimen: Blood from Hand, Left Updated: 04/17/25 1600     Blood Culture No growth at 3 days    POC Glucose Once [[id number]]  (Abnormal) Collected: 04/17/25 1207    Specimen: Blood Updated: 04/17/25 1219     Glucose 263 mg/dL      Comment: : [id number] Luis Antonio GutierrezraMeter ID: [id number]       Basic Metabolic Panel [[id number]]  (Abnormal) Collected: 04/17/25 0501    Specimen: Blood Updated: 04/17/25 1137     Glucose 299 mg/dL      BUN 14 mg/dL      Creatinine 0.85 mg/dL      Sodium 136 mmol/L       Potassium 4.9 mmol/L      Chloride 99 mmol/L      CO2 23.0 mmol/L      Calcium 9.3 mg/dL      BUN/Creatinine Ratio 16.5     Anion Gap 14.0 mmol/L      eGFR 105.9 mL/min/1.73     Narrative:      GFR Categories in Chronic Kidney Disease (CKD)      GFR Category          GFR (mL/min/1.73)    Interpretation  G1                     90 or greater         Normal or high (1)  G2                      60-89                Mild decrease (1)  G3a                   45-59                Mild to moderate decrease  G3b                   30-44                Moderate to severe decrease  G4                    15-29                Severe decrease  G5                    14 or less           Kidney failure          (1)In the absence of evidence of kidney disease, neither GFR category G1 or G2 fulfill the criteria for CKD.    eGFR calculation 2021 CKD-EPI creatinine equation, which does not include race as a factor    POC Glucose Once [[id number]]  (Abnormal) Collected: 04/17/25 0901    Specimen: Blood Updated: 04/17/25 0914     Glucose 428 mg/dL      Comment: : [id number] Luis Antonio CasandraMeter ID: [id number]       Vancomycin, Trough Please draw vancomycin trough on 4/17/25 at 05:00. Call pharmacy with the result before administering the next dose. [[id number]]  (Normal) Collected: 04/17/25 0501    Specimen: Blood Updated: 04/17/25 0602     Vancomycin Trough 10.00 mcg/mL     Wound Culture - Surgical Site, Hand, Digit Right [[id number]] Collected: 04/16/25 1637    Specimen: Surgical Site from Hand, Digit Right Updated: 04/17/25 0526     Gram Stain Rare (1+) WBCs per low power field      Few (2+) Gram positive cocci    POC Glucose Once [[id number]]  (Abnormal) Collected: 04/16/25 2106    Specimen: Blood Updated: 04/16/25 2117     Glucose 259 mg/dL      Comment: : [id number] Morris RodgerseyMeter ID: [id number]       POC Glucose Once [[id number]]  (Abnormal) Collected: 04/16/25 1800    Specimen: Blood Updated: 04/16/25 1811     Glucose 194  mg/dL      Comment: : [id number] Mark Anthony Desouzaeter ID: [id number]       POC Glucose Once [[id number]]  (Abnormal) Collected: 04/16/25 1706    Specimen: Blood Updated: 04/16/25 1717     Glucose 148 mg/dL      Comment: : [id number] Nimo SmithMeter ID: [id number]       Anaerobic Culture - Surgical Site, Hand, Digit Right [[id number]] Collected: 04/16/25 1637    Specimen: Surgical Site from Hand, Digit Right Updated: 04/16/25 1651    Fungus Culture - Surgical Site, Hand, Digit Right [[id number]] Collected: 04/16/25 1637    Specimen: Surgical Site from Hand, Digit Right Updated: 04/16/25 1651                Assessment:     48 y/o RHD M POD#1 s/p R index finger excisional debridement and irrigation, poorly controlled type 2 diabetes mellitus, history of prior MRSA infection, hypertension, coronary artery disease, hyperlipidemia and history of tobacco use    Plan:     1: Right hand: Will begin dressing changes tomorrow.  Continue to follow cultures.  Continue IV antibiotics.  Encourage elevation and gentle finger range of motion.  Pain control.  Could consider discharge home on Friday or Saturday from orthopedic standpoint, depending on progress.  2.  Blood sugar remains elevated, recommend tighter control, again stressed the importance of tight blood sugar control with him today          Luis Miguel Sahni MD

## 2025-04-17 NOTE — PLAN OF CARE
Goal Outcome Evaluation:   Pt A&O x 4. Vital signs stable. Up ad tab. Finger dressing c/d/I. PRN pain meds administered per order. Insulin administered per order.

## 2025-04-17 NOTE — ANESTHESIA POSTPROCEDURE EVALUATION
"Patient: James Mar    Procedure Summary       Date: 04/16/25 Room / Location:  [facility] OR 09 /  [facility]    Anesthesia Start: 1613 Anesthesia Stop: 1703    Procedure: Right index finger incision and drainage (Right: Hand) Diagnosis:       Osteomyelitis, unspecified site, unspecified type      (Osteomyelitis, unspecified site, unspecified type [M86.9])    Surgeons: Luis Miguel Sahni MD Provider: Lucila Joyner CRNA    Anesthesia Type: general ASA Status: 3            Anesthesia Type: general    Vitals  Vitals Value Taken Time   /83 04/16/25 17:45   Temp 98.5 °F (36.9 °C) 04/16/25 17:01   Pulse 79 04/16/25 17:51   Resp 14 04/16/25 17:45   SpO2 93 % 04/16/25 17:51   Vitals shown include unfiled device data.        Post Anesthesia Care and Evaluation    Patient location during evaluation: PHASE II  Patient participation: complete - patient participated  Level of consciousness: awake and awake and alert  Pain score: 0  Pain management: adequate    Airway patency: patent  Anesthetic complications: No anesthetic complications  PONV Status: none  Cardiovascular status: acceptable  Respiratory status: acceptable  Hydration status: acceptable    Comments: Patient discharged according to acceptable Umair score per RN assessment. See nursing records for further information.     Blood pressure 120/78, pulse 94, temperature 97.9 °F (36.6 °C), temperature source Oral, resp. rate 14, height 185.4 cm (73\"), weight 130 kg (286 lb 8 oz), SpO2 95%.      "

## 2025-04-18 LAB
ALBUMIN SERPL-MCNC: 3.7 G/DL (ref 3.5–5.2)
ALBUMIN/GLOB SERPL: 1.4 G/DL
ALP SERPL-CCNC: 52 U/L (ref 39–117)
ALT SERPL W P-5'-P-CCNC: 23 U/L (ref 1–41)
ANION GAP SERPL CALCULATED.3IONS-SCNC: 12 MMOL/L (ref 5–15)
AST SERPL-CCNC: 13 U/L (ref 1–40)
BASOPHILS # BLD AUTO: 0.07 10*3/MM3 (ref 0–0.2)
BASOPHILS NFR BLD AUTO: 1 % (ref 0–1.5)
BILIRUB SERPL-MCNC: 0.2 MG/DL (ref 0–1.2)
BUN SERPL-MCNC: 14 MG/DL (ref 6–20)
BUN/CREAT SERPL: 16.7 (ref 7–25)
CALCIUM SPEC-SCNC: 8.6 MG/DL (ref 8.6–10.5)
CHLORIDE SERPL-SCNC: 101 MMOL/L (ref 98–107)
CO2 SERPL-SCNC: 26 MMOL/L (ref 22–29)
CREAT SERPL-MCNC: 0.84 MG/DL (ref 0.76–1.27)
DEPRECATED RDW RBC AUTO: 41.8 FL (ref 37–54)
EGFRCR SERPLBLD CKD-EPI 2021: 106.2 ML/MIN/1.73
EOSINOPHIL # BLD AUTO: 0.07 10*3/MM3 (ref 0–0.4)
EOSINOPHIL NFR BLD AUTO: 1 % (ref 0.3–6.2)
ERYTHROCYTE [DISTWIDTH] IN BLOOD BY AUTOMATED COUNT: 12.8 % (ref 12.3–15.4)
GLOBULIN UR ELPH-MCNC: 2.7 GM/DL
GLUCOSE BLDC GLUCOMTR-MCNC: 215 MG/DL (ref 70–130)
GLUCOSE BLDC GLUCOMTR-MCNC: 225 MG/DL (ref 70–130)
GLUCOSE BLDC GLUCOMTR-MCNC: 244 MG/DL (ref 70–130)
GLUCOSE BLDC GLUCOMTR-MCNC: 269 MG/DL (ref 70–130)
GLUCOSE SERPL-MCNC: 217 MG/DL (ref 65–99)
HCT VFR BLD AUTO: 39.4 % (ref 37.5–51)
HGB BLD-MCNC: 13.1 G/DL (ref 13–17.7)
IMM GRANULOCYTES # BLD AUTO: 0.03 10*3/MM3 (ref 0–0.05)
IMM GRANULOCYTES NFR BLD AUTO: 0.4 % (ref 0–0.5)
LYMPHOCYTES # BLD AUTO: 2.34 10*3/MM3 (ref 0.7–3.1)
LYMPHOCYTES NFR BLD AUTO: 32.5 % (ref 19.6–45.3)
MCH RBC QN AUTO: 29.4 PG (ref 26.6–33)
MCHC RBC AUTO-ENTMCNC: 33.2 G/DL (ref 31.5–35.7)
MCV RBC AUTO: 88.3 FL (ref 79–97)
MONOCYTES # BLD AUTO: 0.47 10*3/MM3 (ref 0.1–0.9)
MONOCYTES NFR BLD AUTO: 6.5 % (ref 5–12)
NEUTROPHILS NFR BLD AUTO: 4.23 10*3/MM3 (ref 1.7–7)
NEUTROPHILS NFR BLD AUTO: 58.6 % (ref 42.7–76)
NRBC BLD AUTO-RTO: 0 /100 WBC (ref 0–0.2)
PLATELET # BLD AUTO: 241 10*3/MM3 (ref 140–450)
PMV BLD AUTO: 10.5 FL (ref 6–12)
POTASSIUM SERPL-SCNC: 3.8 MMOL/L (ref 3.5–5.2)
PROT SERPL-MCNC: 6.4 G/DL (ref 6–8.5)
RBC # BLD AUTO: 4.46 10*6/MM3 (ref 4.14–5.8)
SODIUM SERPL-SCNC: 139 MMOL/L (ref 136–145)
WBC NRBC COR # BLD AUTO: 7.21 10*3/MM3 (ref 3.4–10.8)

## 2025-04-18 PROCEDURE — 25010000002 VANCOMYCIN 2-0.9 GM/500ML-% SOLUTION: Performed by: ORTHOPAEDIC SURGERY

## 2025-04-18 PROCEDURE — 80053 COMPREHEN METABOLIC PANEL: CPT | Performed by: HOSPITALIST

## 2025-04-18 PROCEDURE — 25010000002 MORPHINE PER 10 MG: Performed by: ORTHOPAEDIC SURGERY

## 2025-04-18 PROCEDURE — 85025 COMPLETE CBC W/AUTO DIFF WBC: CPT | Performed by: HOSPITALIST

## 2025-04-18 PROCEDURE — 63710000001 INSULIN LISPRO (HUMAN) PER 5 UNITS: Performed by: ORTHOPAEDIC SURGERY

## 2025-04-18 PROCEDURE — 63710000001 INSULIN GLARGINE PER 5 UNITS: Performed by: HOSPITALIST

## 2025-04-18 PROCEDURE — 25010000002 PIPERACILLIN SOD-TAZOBACTAM PER 1 G: Performed by: ORTHOPAEDIC SURGERY

## 2025-04-18 PROCEDURE — 82948 REAGENT STRIP/BLOOD GLUCOSE: CPT

## 2025-04-18 PROCEDURE — 99232 SBSQ HOSP IP/OBS MODERATE 35: CPT | Performed by: INTERNAL MEDICINE

## 2025-04-18 RX ADMIN — Medication 2000 MG: at 05:10

## 2025-04-18 RX ADMIN — MORPHINE SULFATE 1 MG: 2 INJECTION, SOLUTION INTRAMUSCULAR; INTRAVENOUS at 06:13

## 2025-04-18 RX ADMIN — OXYCODONE HYDROCHLORIDE AND ACETAMINOPHEN 1 TABLET: 5; 325 TABLET ORAL at 14:48

## 2025-04-18 RX ADMIN — INSULIN LISPRO 8 UNITS: 100 INJECTION, SOLUTION INTRAVENOUS; SUBCUTANEOUS at 17:24

## 2025-04-18 RX ADMIN — PIPERACILLIN AND TAZOBACTAM 3.38 G: 3; .375 INJECTION, POWDER, FOR SOLUTION INTRAVENOUS; PARENTERAL at 06:13

## 2025-04-18 RX ADMIN — INSULIN LISPRO 8 UNITS: 100 INJECTION, SOLUTION INTRAVENOUS; SUBCUTANEOUS at 12:35

## 2025-04-18 RX ADMIN — Medication 10 ML: at 08:22

## 2025-04-18 RX ADMIN — Medication 10 ML: at 22:26

## 2025-04-18 RX ADMIN — MORPHINE SULFATE 1 MG: 2 INJECTION, SOLUTION INTRAMUSCULAR; INTRAVENOUS at 01:58

## 2025-04-18 RX ADMIN — INSULIN LISPRO 8 UNITS: 100 INJECTION, SOLUTION INTRAVENOUS; SUBCUTANEOUS at 08:21

## 2025-04-18 RX ADMIN — Medication 2000 MG: at 17:24

## 2025-04-18 RX ADMIN — INSULIN LISPRO 12 UNITS: 100 INJECTION, SOLUTION INTRAVENOUS; SUBCUTANEOUS at 22:26

## 2025-04-18 RX ADMIN — INSULIN GLARGINE 15 UNITS: 100 INJECTION, SOLUTION SUBCUTANEOUS at 08:21

## 2025-04-18 RX ADMIN — INSULIN GLARGINE 15 UNITS: 100 INJECTION, SOLUTION SUBCUTANEOUS at 22:26

## 2025-04-18 RX ADMIN — MORPHINE SULFATE 1 MG: 2 INJECTION, SOLUTION INTRAMUSCULAR; INTRAVENOUS at 16:04

## 2025-04-18 NOTE — PLAN OF CARE
Goal Outcome Evaluation:  Plan of Care Reviewed With: patient        Progress: improving  Outcome Evaluation: a/o x 4. c/o pain. (See MAR) Ad tab. Voiding. VSS. Safety maintained.

## 2025-04-18 NOTE — PROGRESS NOTES
Orthopedic Surgery Progress Note    James Mar  4/18/2025      Subjective:     No acute events overnight.  Reports ongoing, stable pain but improved motion.  Denies any other joint pain, fever or chills.    Objective:     Patient Vitals for the past 24 hrs:   BP Temp Temp src Pulse Resp SpO2   04/18/25 0445 126/81 98.3 °F (36.8 °C) Oral 84 16 98 %   04/17/25 2247 -- 98.1 °F (36.7 °C) Oral -- -- --   04/17/25 2045 120/67 97.8 °F (36.6 °C) Oral 93 16 96 %   04/17/25 1523 117/71 98.2 °F (36.8 °C) Oral 82 16 99 %   04/17/25 0734 135/84 98 °F (36.7 °C) Oral 88 18 98 %       General: Awake, alert  Respiratory: Nonlabored  CV: Regular rate  Extremity: RUE: Dressing in place, clean, dry and intact.  Gentle flexion improved from preoperative status.  Stable neurovascular exam of the remainder of the right hand.      Data Review:  Lab Results (last 24 hours)       Procedure Component Value Units Date/Time    Wound Culture - Surgical Site, Hand, Digit Right [[id number]]  (Abnormal) Collected: 04/16/25 1637    Specimen: Surgical Site from Hand, Digit Right Updated: 04/18/25 0614     Wound Culture Heavy growth (4+) Staphylococcus aureus, MRSA     Comment:   Methicillin resistant Staphylococcus aureus, Patient may be an isolation risk.        Gram Stain Rare (1+) WBCs per low power field      Few (2+) Gram positive cocci    Comprehensive Metabolic Panel [[id number]]  (Abnormal) Collected: 04/18/25 0223    Specimen: Blood Updated: 04/18/25 0306     Glucose 217 mg/dL      BUN 14 mg/dL      Creatinine 0.84 mg/dL      Sodium 139 mmol/L      Potassium 3.8 mmol/L      Chloride 101 mmol/L      CO2 26.0 mmol/L      Calcium 8.6 mg/dL      Total Protein 6.4 g/dL      Albumin 3.7 g/dL      ALT (SGPT) 23 U/L      AST (SGOT) 13 U/L      Alkaline Phosphatase 52 U/L      Total Bilirubin 0.2 mg/dL      Globulin 2.7 gm/dL      A/G Ratio 1.4 g/dL      BUN/Creatinine Ratio 16.7     Anion Gap 12.0 mmol/L      eGFR 106.2 mL/min/1.73      Narrative:      GFR Categories in Chronic Kidney Disease (CKD)      GFR Category          GFR (mL/min/1.73)    Interpretation  G1                     90 or greater         Normal or high (1)  G2                      60-89                Mild decrease (1)  G3a                   45-59                Mild to moderate decrease  G3b                   30-44                Moderate to severe decrease  G4                    15-29                Severe decrease  G5                    14 or less           Kidney failure          (1)In the absence of evidence of kidney disease, neither GFR category G1 or G2 fulfill the criteria for CKD.    eGFR calculation 2021 CKD-EPI creatinine equation, which does not include race as a factor    CBC & Differential [[id number]]  (Normal) Collected: 04/18/25 0224    Specimen: Blood Updated: 04/18/25 0243    Narrative:      The following orders were created for panel order CBC & Differential.  Procedure                               Abnormality         Status                     ---------                               -----------         ------                     CBC Auto Differential[[id number]]        Normal              Final result                 Please view results for these tests on the individual orders.    CBC Auto Differential [[id number]]  (Normal) Collected: 04/18/25 0224    Specimen: Blood Updated: 04/18/25 0243     WBC 7.21 10*3/mm3      RBC 4.46 10*6/mm3      Hemoglobin 13.1 g/dL      Hematocrit 39.4 %      MCV 88.3 fL      MCH 29.4 pg      MCHC 33.2 g/dL      RDW 12.8 %      RDW-SD 41.8 fl      MPV 10.5 fL      Platelets 241 10*3/mm3      Neutrophil % 58.6 %      Lymphocyte % 32.5 %      Monocyte % 6.5 %      Eosinophil % 1.0 %      Basophil % 1.0 %      Immature Grans % 0.4 %      Neutrophils, Absolute 4.23 10*3/mm3      Lymphocytes, Absolute 2.34 10*3/mm3      Monocytes, Absolute 0.47 10*3/mm3      Eosinophils, Absolute 0.07 10*3/mm3      Basophils, Absolute 0.07 10*3/mm3       Immature Grans, Absolute 0.03 10*3/mm3      nRBC 0.0 /100 WBC     POC Glucose Once [[id number]]  (Abnormal) Collected: 04/17/25 2020    Specimen: Blood Updated: 04/17/25 2031     Glucose 374 mg/dL      Comment: : [id number] Jamila LorenzoMeter ID: [id number]       POC Glucose Once [[id number]]  (Abnormal) Collected: 04/17/25 1722    Specimen: Blood Updated: 04/17/25 1734     Glucose 276 mg/dL      Comment: : [id number] Jacqueline DylanMeter ID: [id number]       Blood Culture - Blood, Arm, Left [[id number]]  (Normal) Collected: 04/14/25 1544    Specimen: Blood from Arm, Left Updated: 04/17/25 1600     Blood Culture No growth at 3 days    Blood Culture - Blood, Hand, Left [[id number]]  (Normal) Collected: 04/14/25 1452    Specimen: Blood from Hand, Left Updated: 04/17/25 1600     Blood Culture No growth at 3 days    POC Glucose Once [[id number]]  (Abnormal) Collected: 04/17/25 1207    Specimen: Blood Updated: 04/17/25 1219     Glucose 263 mg/dL      Comment: : [id number] Luis Antonio QuinonesandraMeter ID: [id number]       Basic Metabolic Panel [[id number]]  (Abnormal) Collected: 04/17/25 0501    Specimen: Blood Updated: 04/17/25 1137     Glucose 299 mg/dL      BUN 14 mg/dL      Creatinine 0.85 mg/dL      Sodium 136 mmol/L      Potassium 4.9 mmol/L      Chloride 99 mmol/L      CO2 23.0 mmol/L      Calcium 9.3 mg/dL      BUN/Creatinine Ratio 16.5     Anion Gap 14.0 mmol/L      eGFR 105.9 mL/min/1.73     Narrative:      GFR Categories in Chronic Kidney Disease (CKD)      GFR Category          GFR (mL/min/1.73)    Interpretation  G1                     90 or greater         Normal or high (1)  G2                      60-89                Mild decrease (1)  G3a                   45-59                Mild to moderate decrease  G3b                   30-44                Moderate to severe decrease  G4                    15-29                Severe decrease  G5                    14 or less           Kidney failure          (1)In the  absence of evidence of kidney disease, neither GFR category G1 or G2 fulfill the criteria for CKD.    eGFR calculation 2021 CKD-EPI creatinine equation, which does not include race as a factor    POC Glucose Once [[id number]]  (Abnormal) Collected: 04/17/25 0901    Specimen: Blood Updated: 04/17/25 0914     Glucose 428 mg/dL      Comment: : [id number] Luis Antonio GutierrezraMeter ID: [id number]                   Assessment:     48 y/o RHD M POD#2 s/p R index finger excisional debridement and irrigation, poorly controlled type 2 diabetes mellitus, history of prior MRSA infection, hypertension, coronary artery disease, hyperlipidemia and history of tobacco use    Plan:     1: Right hand: Will begin dressing changes today-discussed with nursing staff.  Intraoperative cultures consistent with MRSA.  Continue IV antibiotics.  Encourage elevation and gentle finger range of motion.  Pain control.  Would recommend IV antibiotics for an additional day, possible discharge home Saturday from orthopedic standpoint  2.  Tight blood sugar control          Luis Miguel Sahni MD

## 2025-04-18 NOTE — PROGRESS NOTES
[facility] Medicine Services  INPATIENT PROGRESS NOTE    Patient Name: James Mar  Date of Admission: 4/14/2025  Today's Date: 04/18/25  Length of Stay: 4  Primary Care Physician: Radha Mortensen APRN    Subjective   Primary Historian: patient      Chief Complaint: R hand erythema edema drainage      Wound Infection  Abscess     49-year-old male with PMH of morbid obesity ? MARYBEL. DM  , HTN , CAD s/p stenting who states that he has an infection of his right index finger. He states that has been going on for about a week and getting worse. He states that he is not able to bend his finger anymore. He states that the swelling and redness is getting worse. He is on Bactrim. But no improvement of his symptoms. He has tried to drain it himself with no significant amount of drainage. Patient denies any other symptoms at this time   In ED wbc -ve, R hand xray ? Osteo 2nd distal phalanx, AG elevated ED did not feel the patient is in DKA, BC obtained started on vanco, will adm,it continue zosyn, vanco, SSI high, IVF check A1c, consult ID, ortho, scds for DVT prophylaxis, identify reconcile restart home meds once reconciled  4/15  For history of morbid obesity questionable sleep apnea history of diabetes hypertension coronary artery disease status post stenting A1c is 10.7 presented with the worsening of right hand infection questionable osteomyelitis of the second distal phalanx placed on vancomycin and Zosyn ID consulted and so was orthopedic surgery Recommending MRI to assess for potential deeper infection and more proximal involvement.  Will plan for incision and drainage of right index finger tomorrow.  Was having bradycardia asymptomatic at night during sleep strongly suspect sleep apnea check his TSH MRSA screen was positive  Blood cultures remain pending his anion gap has closed we did not need any DKA protocol and his blood sugar is much better educated that he needs to be  started on insulin  4/16  As before MRI of the right hand showing Findings suspicious for osteomyelitis of the distal second phalanx  with marrow edema and low T1 signal in the distal phalanx as well as  extensive adjacent soft tissue swelling and edema appreciate ID remains on Vanco and Zosyn appreciate Ortho possible OR today  4/17  Postop day #1 status post I&D of right index finger paronychial/eponychial/hyponychial infection as before A1c 10.7 blood sugar has been elevated started him on Lantus appreciate  ID remains on Zosyn vancomycin awaiting cultures sensitivities  4/18  As before intraoperative wound culture showing positive MRSA continue IV antibiotics blood sugar is getting better patient has been educated remains on Zosyn vancomycin appreciate ID   Review of Systems   All other systems reviewed and are negative.     All pertinent negatives and positives are as above. All other systems have been reviewed and are negative unless otherwise stated.     Objective    Temp:  [97.8 °F (36.6 °C)-98.3 °F (36.8 °C)] 98.1 °F (36.7 °C)  Heart Rate:  [78-93] 78  Resp:  [16] 16  BP: (117-131)/(67-85) 131/85  Physical Exam  HENT:      Head: Normocephalic.      Nose: Nose normal.   Eyes:      Pupils: Pupils are equal, round, and reactive to light.   Cardiovascular:      Rate and Rhythm: Normal rate.   Pulmonary:      Breath sounds: Wheezing present.   Abdominal:      General: Abdomen is flat.   Musculoskeletal:      Cervical back: Normal range of motion.      Comments: Right hand cellulitis erythema edema   Neurological:      Mental Status: He is alert.             Results Review:  I have reviewed the labs, radiology results, and diagnostic studies.    Laboratory Data:   Results from last 7 days   Lab Units 04/18/25  0224 04/16/25  0456 04/15/25  0235   WBC 10*3/mm3 7.21 6.47 7.36   HEMOGLOBIN g/dL 13.1 14.8 14.4   HEMATOCRIT % 39.4 45.1 42.8   PLATELETS 10*3/mm3 241 260 282        Results from last 7 days   Lab Units  "04/18/25  0223 04/17/25  0501 04/16/25  0456 04/14/25  1707 04/14/25  1452   SODIUM mmol/L 139 136 135*   < > 135*  135*   POTASSIUM mmol/L 3.8 4.9 4.4   < > 4.0  4.0   CHLORIDE mmol/L 101 99 98   < > 97*  97*   CO2 mmol/L 26.0 23.0 24.0   < > 18.0*  21.0*   BUN mg/dL 14 14 10   < > 17  16   CREATININE mg/dL 0.84 0.85 0.82   < > 1.22  1.20   CALCIUM mg/dL 8.6 9.3 9.1   < > 9.9  9.8   BILIRUBIN mg/dL 0.2  --   --   --  0.3   ALK PHOS U/L 52  --   --   --  68   ALT (SGPT) U/L 23  --   --   --  32   AST (SGOT) U/L 13  --   --   --  26   GLUCOSE mg/dL 217* 299* 273*   < > 331*  327*    < > = values in this interval not displayed.       Culture Data:   No results found for: \"BLOODCX\", \"URINECX\", \"WOUNDCX\", \"MRSACX\", \"RESPCX\", \"STOOLCX\"    Radiology Data:   Imaging Results (Last 24 Hours)       ** No results found for the last 24 hours. **            I have reviewed the patient's current medications.     Assessment/Plan   Assessment  Active Hospital Problems    Diagnosis     Acute paronychia of finger of right hand        Treatment Plan  PMH of morbid obesity ? MARYBEL. DM  , HTN who states that he has an infection of his right index finger. He states that has been going on for about a week and getting worse. He states that he is not able to bend his finger anymore. He states that the swelling and redness is getting worse. He is on Bactrim. But no improvement of his symptoms. He has tried to drain it himself with no significant amount of drainage. Patient denies any other symptoms at this time   In ED wbc -ve, R hand xray ? Osteo 2nd distal phalanx, AG elevated ED did not feel the patient is in DKA, BC obtained started on vanco, will adm,it continue zosyn, vanco, SSI high, IVF check A1c, consult ID, ortho, scds for DVT prophylaxis, identify reconcile restart home meds once reconciled tristen Nelson.      Medical Decision Making  Number and Complexity of problems: 3 acute   Differential Diagnosis: as above    "   Conditions and Status        Condition is at treatment goal.     [facility] Data  External documents reviewed: yes  Cardiac tracing (EKG, telemetry) interpretation: yes  Radiology interpretation: yes  Labs reviewed: yes  Any tests that were considered but not ordered: no     Decision rules/scores evaluated (example [id number], Wells, etc): no     Discussed with: ED     Care Planning  Shared decision making: Patient apprised of current labs, vitals, imaging and treatment plan.  They are agreeable with proceeding with plans as discussed.   Code status and discussions: full      Disposition  Social Determinants of Health that impact treatment or disposition: no  Estimated length of stay is TBD.      I confirmed that the patient's advanced care plan is present, code status is documented, and a surrogate decision maker is listed in the patient's medical record        Electronically signed by Ashanti Whatley MD, 04/18/25, 09:58 CDT.

## 2025-04-18 NOTE — PROGRESS NOTES
"INFECTIOUS DISEASES PROGRESS NOTE    Patient:  James Mar  YOB: 1975  MRN: [id number]   Admit date: 2025   Admitting Physician: Ashanti Whatley MD  Primary Care Physician: Radha Mortensen APRN    Chief Complaint: Finger infection        Interval History: Patient offers no new complaints.  He states he was told he might go home tomorrow.      Allergies: No Known Allergies    Current Scheduled Medications:   insulin glargine, 15 Units, Subcutaneous, Q12H  insulin lispro, 4-24 Units, Subcutaneous, 4x Daily AC & at Bedtime  piperacillin-tazobactam, 3.375 g, Intravenous, Q8H  sodium chloride, 10 mL, Intravenous, Q12H  vancomycin, 2,000 mg, Intravenous, Q12H      Current PRN Medications:    acetaminophen **OR** acetaminophen **OR** acetaminophen    senna-docusate sodium **AND** polyethylene glycol **AND** bisacodyl **AND** bisacodyl    Calcium Replacement - Follow Nurse / BPA Driven Protocol    dextrose    dextrose    glucagon (human recombinant)    Magnesium Standard Dose Replacement - Follow Nurse / BPA Driven Protocol    Morphine **AND** naloxone    ondansetron    oxyCODONE-acetaminophen    Phosphorus Replacement - Follow Nurse / BPA Driven Protocol    Potassium Replacement - Follow Nurse / BPA Driven Protocol    [COMPLETED] Insert Peripheral IV **AND** sodium chloride    sodium chloride    sodium chloride            Objective     Vital Signs:  Temp (24hrs), Av °F (36.7 °C), Min:97.5 °F (36.4 °C), Max:98.3 °F (36.8 °C)      /78 (BP Location: Left arm, Patient Position: Lying)   Pulse 78   Temp 97.5 °F (36.4 °C) (Oral)   Resp 16   Ht 185.4 cm (73\")   Wt 130 kg (286 lb 8 oz)   SpO2 99%   BMI 37.80 kg/m²         Physical Exam:  General: Patient nontoxic-appearing lying in bed on his right side.  Respiratory: Effort even and unlabored  Right hand dressing clean dry and intact    Preop photos                  Results Review:    I reviewed the patient's new clinical " results.    Lab Results:    CBC:   Lab Results   Lab 04/14/25  1452 04/14/25  1707 04/15/25  0235 04/16/25  0456 04/18/25  0224   WBC 8.11 8.17 7.36 6.47 7.21   HEMOGLOBIN 15.4 15.1 14.4 14.8 13.1   HEMATOCRIT 45.9 45.1 42.8 45.1 39.4   PLATELETS 282 276 282 260 241        AutoDiff:   Lab Results   Lab 04/15/25  0235 04/16/25 0456 04/18/25  0224   NEUTROPHIL % 58.0 60.2 58.6   LYMPHOCYTE % 31.0 30.0 32.5   MONOCYTES % 7.7 6.2 6.5   EOSINOPHIL % 1.8 1.4 1.0   BASOPHIL % 1.0 1.7* 1.0   NEUTROS ABS 4.27 3.90 4.23   LYMPHS ABS 2.28 1.94 2.34   MONOS ABS 0.57 0.40 0.47   EOS ABS 0.13 0.09 0.07   BASOS ABS 0.07 0.11 0.07        Manual Diff:    Lab Results   Lab 04/15/25  0235 04/16/25 0456 04/18/25  0224   NEUTROS ABS 4.27 3.90 4.23           CMP:   Lab Results   Lab 04/14/25  1452 04/14/25  1707 04/16/25 0456 04/17/25  0501 04/18/25  0223   SODIUM 135*  135*   < > 135* 136 139   POTASSIUM 4.0  4.0   < > 4.4 4.9 3.8   CHLORIDE 97*  97*   < > 98 99 101   CO2 18.0*  21.0*   < > 24.0 23.0 26.0   BUN 17  16   < > 10 14 14   CREATININE 1.22  1.20   < > 0.82 0.85 0.84   CALCIUM 9.9  9.8   < > 9.1 9.3 8.6   BILIRUBIN 0.3  --   --   --  0.2   ALK PHOS 68  --   --   --  52   ALT (SGPT) 32  --   --   --  23   AST (SGOT) 26  --   --   --  13   GLUCOSE 331*  327*   < > 273* 299* 217*    < > = values in this interval not displayed.       Estimated Creatinine Clearance: 148.7 mL/min (by C-G formula based on SCr of 0.84 mg/dL).     Latest Reference Range & Units 04/16/25 18:00 04/16/25 21:06 04/17/25 05:01 04/17/25 09:01 04/17/25 12:07 04/17/25 17:22 04/17/25 20:20 04/18/25 02:23 04/18/25 07:56 04/18/25 11:53   Glucose 70 - 130 mg/dL 194 (H) 259 (H) 299 (H) 428 (C) 263 (H) 276 (H) 374 (H) 217 (H) 244 (H) 225 (H)   (C): Data is critically high  (H): Data is abnormally high    Culture Results:    Microbiology Results (last 10 days)       Procedure Component Value - Date/Time    Wound Culture - Surgical Site, Hand, Digit Right  [[id number]]  (Abnormal) Collected: 04/16/25 1637    Lab Status: Preliminary result Specimen: Surgical Site from Hand, Digit Right Updated: 04/18/25 0614     Wound Culture Heavy growth (4+) Staphylococcus aureus, MRSA     Comment:   Methicillin resistant Staphylococcus aureus, Patient may be an isolation risk.        Gram Stain Rare (1+) WBCs per low power field      Few (2+) Gram positive cocci    MRSA Screen, PCR (Inpatient) - Swab, Nares [[id number]]  (Abnormal) Collected: 04/14/25 2039    Lab Status: Final result Specimen: Swab from Nares Updated: 04/14/25 2233     MRSA PCR MRSA Detected    Narrative:      The negative predictive value of this diagnostic test is high and should only be used to consider de-escalating anti-MRSA therapy. A positive result may indicate colonization with MRSA and must be correlated clinically.    Blood Culture - Blood, Arm, Left [[id number]]  (Normal) Collected: 04/14/25 1544    Lab Status: Preliminary result Specimen: Blood from Arm, Left Updated: 04/17/25 1600     Blood Culture No growth at 3 days    Blood Culture - Blood, Hand, Left [[id number]]  (Normal) Collected: 04/14/25 1452    Lab Status: Preliminary result Specimen: Blood from Hand, Left Updated: 04/17/25 1600     Blood Culture No growth at 3 days                 Radiology:   Imaging Results (Last 72 Hours)       Procedure Component Value Units Date/Time    MRI Hand Right Without Contrast [[id number]] Collected: 04/15/25 1229     Updated: 04/15/25 1244    Narrative:      EXAMINATION: MRI HAND RIGHT WO CONTRAST-  4/15/2025 12:30 PM     HISTORY: Evaluate for deep abscess right index finger distal phalanx;  M86.9-Osteomyelitis, unspecified. Progressive RIGHT index finger distal  phalanx pain, swelling, redness, and decreased range of motion.  Radiographs were concerning for possible osteomyelitis.     COMPARISON: None      TECHNIQUE: Multiplanar, multiphasic images of the RIGHT hand were  obtained without the use of intravenous  contrast.     FINDINGS:     BONE MARROW: There is extensive marrow edema in the second distal  phalanx with high T2 and low T1 signal throughout the distal phalanx.  Some resorption of the radial margin of the distal tuft. Marrow signal  elsewhere in the visualized portion of the hand is relatively  well-preserved with the exception of arthritic changes and subchondral  cystic change in the second, third, and fourth metacarpal heads as well  as the dorsal aspect of the third proximal phalanx. These findings are  related to arthritis..     JOINT SPACES: No suspicious joint fluid.     SOFT TISSUES: Extensive soft tissue edema about the second digit most  pronounced dorsally and along the radial margin of the distal phalanx.  This may represent a soft tissue blister but is nonspecific. It should  be noted that no contrast was administered on this examination and there  is some artifact running through the distal aspect of the second digit  making characterization somewhat suboptimal. I do not see evidence of  flexor tenosynovitis. There is some diffusion restriction in the soft  tissues along the palmar aspect of the distal second digit along the  palmar radial margin (series 502, images 11 and 12). While this isn't  definitively a drainable abscess on the T2 images, underlying soft  tissue infection such as a small microabscess at this location is  possible given the appearance on the DWI pulse sequence.     SYNOVIUM: Normal     FLEXOR TENDONS: Normal     EXTENSOR TENDONS: Normal     OTHER: Normal          Impression:         1.  Findings suspicious for osteomyelitis of the distal second phalanx  with marrow edema and low T1 signal in the distal phalanx as well as  extensive adjacent soft tissue swelling and edema.     2.  Small focus of diffusion restriction along the palmar radial aspect  of the distal phalanx may represent a small microabscess in the soft  tissues. No contrast was administered on this exam,  limiting  characterization of the soft tissues.     3.  No evidence of flexor tenosynovitis.     This report was signed and finalized on 4/15/2025 12:41 PM by Dr. Ramses Bocanegra MD.                   Active Hospital Problems    Diagnosis     Acute paronychia of finger of right hand        IMPRESSION:  MRSA infection right index finger after presentation with paronychia/soft tissue abscess.  Status post irrigation and debridement per Dr. Sahni  Uncontrolled diabetes mellitus with hemoglobin A1c 9.3 on April 14, 2025-glucose remains elevated ranging from 217-428 the past 2 days.  Questionable osteo on x-rays.  Reviewed Dr. Sahni's interpretation of films.  No clear bony resorption noted.  Patient has healed second metatarsal shaft fracture with malunion.      RECOMMENDATION:   Discontinue Zosyn  Continue vancomycin while awaiting SHAUN data  Okay to transition to oral antibiotic therapy upon discharge-if SHAUN data not available, would consider linezolid p.o.   Glucose control per hospitalist-ideally glucose below 200 at all times if not lower      Shannon Smith MD  04/18/25  12:35 CDT

## 2025-04-18 NOTE — PLAN OF CARE
Problem: Adult Inpatient Plan of Care  Goal: Plan of Care Review  Outcome: Progressing  Flowsheets (Taken 4/18/2025 0310 by Nova Murray RN)  Plan of Care Reviewed With: patient  Goal: Patient-Specific Goal (Individualized)  Outcome: Progressing  Goal: Absence of Hospital-Acquired Illness or Injury  Outcome: Progressing  Intervention: Identify and Manage Fall Risk  Recent Flowsheet Documentation  Taken 4/18/2025 0821 by Karina Chamberlain RN  Safety Promotion/Fall Prevention:   safety round/check completed   clutter free environment maintained   assistive device/personal items within reach   nonskid shoes/slippers when out of bed  Intervention: Prevent Skin Injury  Recent Flowsheet Documentation  Taken 4/18/2025 0821 by Karina Chamberlain RN  Body Position: position changed independently  Intervention: Prevent and Manage VTE (Venous Thromboembolism) Risk  Recent Flowsheet Documentation  Taken 4/18/2025 0821 by Karina Chamberlain RN  VTE Prevention/Management:   bilateral   SCDs (sequential compression devices) on  Goal: Optimal Comfort and Wellbeing  Outcome: Progressing  Goal: Readiness for Transition of Care  Outcome: Progressing     Problem: Skin or Soft Tissue Infection  Goal: Absence of Infection Signs and Symptoms  Outcome: Progressing  Intervention: Minimize and Manage Infection Progression  Recent Flowsheet Documentation  Taken 4/18/2025 0821 by Karina Chamberlain RN  Isolation Precautions:   precautions maintained   contact     Problem: Pain Acute  Goal: Optimal Pain Control and Function  Outcome: Progressing     Problem: Comorbidity Management  Goal: Blood Pressure in Desired Range  Outcome: Progressing  Goal: Maintenance of Osteoarthritis Symptom Control  Outcome: Progressing  Intervention: Maintain Osteoarthritis Symptom Control  Recent Flowsheet Documentation  Taken 4/18/2025 0821 by Karina Chamberlain RN  Activity Management: up ad tab     Problem: Fall Injury Risk  Goal: Absence of Fall and  Fall-Related Injury  Outcome: Progressing  Intervention: Promote Injury-Free Environment  Recent Flowsheet Documentation  Taken 4/18/2025 0821 by Karina Chamberlain RN  Safety Promotion/Fall Prevention:   safety round/check completed   clutter free environment maintained   assistive device/personal items within reach   nonskid shoes/slippers when out of bed   Goal Outcome Evaluation:

## 2025-04-18 NOTE — PROGRESS NOTES
"Pharmacy Dosing Service  Pharmacokinetics  Vancomycin Follow-up Evaluation    Assessment:  Active Hospital Problems    Diagnosis  POA    Acute paronychia of finger of right hand [L03.011]  Yes       Current Order: Vancomycin 2000 mg IVPB every 12 hours  Indication: MRSA infection R index finger  Current end date:4/21/25    Levels/Previous evaluations:   Vancomycin trough = 10 (~10.62 hours post dose) while on 1750 mg Q12H    Other antimicrobials and/or additional factors considered in dosing methods:   MRSA+    AUC Model Data - Current Regimen:  Regimen: 2000 mg IV every 12 hours.  Exposure target: AUC24 (range)400-600 mg/L.hr   AUC24,ss: 485 mg/L.hr  Probability of AUC24 > 400: 90 %  Ctrough,ss: 11.2 mg/L  Probability of Ctrough,ss > 20: 0 %  Probability of nephrotoxicity (Lodise GEOVANY 2009): 7 %        AUC Model Data - Proposed Regimen:  Same       Plan: continue current dose. Check trough tomorrow morning prior to 0600 dose.     Clinical pharmacist following daily     Subjective:  James Mar is a 49 y.o. male currently on Vancomycin, day 5 of treatment.      Objective:  Ht: 185.4 cm (73\"); Wt: 130 kg (286 lb 8 oz)  Estimated Creatinine Clearance: 148.7 mL/min (by C-G formula based on SCr of 0.84 mg/dL).   Creatinine   Date Value Ref Range Status   04/18/2025 0.84 0.76 - 1.27 mg/dL Final   04/17/2025 0.85 0.76 - 1.27 mg/dL Final   04/16/2025 0.82 0.76 - 1.27 mg/dL Final      Lab Results   Component Value Date    WBC 7.21 04/18/2025    WBC 6.47 04/16/2025    WBC 7.36 04/15/2025         Lab Results   Component Value Date    VANCOTROUGH 10.00 04/17/2025       Culture Results:  Microbiology Results (last 10 days)       Procedure Component Value - Date/Time    Wound Culture - Surgical Site, Hand, Digit Right [[id number]]  (Abnormal) Collected: 04/16/25 1637    Lab Status: Preliminary result Specimen: Surgical Site from Hand, Digit Right Updated: 04/18/25 0614     Wound Culture Heavy growth (4+) Staphylococcus " aureus, MRSA     Comment:   Methicillin resistant Staphylococcus aureus, Patient may be an isolation risk.        Gram Stain Rare (1+) WBCs per low power field      Few (2+) Gram positive cocci    MRSA Screen, PCR (Inpatient) - Swab, Nares [[id number]]  (Abnormal) Collected: 04/14/25 2039    Lab Status: Final result Specimen: Swab from Nares Updated: 04/14/25 2233     MRSA PCR MRSA Detected    Narrative:      The negative predictive value of this diagnostic test is high and should only be used to consider de-escalating anti-MRSA therapy. A positive result may indicate colonization with MRSA and must be correlated clinically.    Blood Culture - Blood, Arm, Left [[id number]]  (Normal) Collected: 04/14/25 1544    Lab Status: Preliminary result Specimen: Blood from Arm, Left Updated: 04/17/25 1600     Blood Culture No growth at 3 days    Blood Culture - Blood, Hand, Left [[id number]]  (Normal) Collected: 04/14/25 1452    Lab Status: Preliminary result Specimen: Blood from Hand, Left Updated: 04/17/25 1600     Blood Culture No growth at 3 days            Maged Tanner, PharmD   04/18/25 15:28 CDT

## 2025-04-19 ENCOUNTER — READMISSION MANAGEMENT (OUTPATIENT)
Dept: CALL CENTER | Facility: HOSPITAL | Age: 50
End: 2025-04-19
Payer: COMMERCIAL

## 2025-04-19 VITALS
DIASTOLIC BLOOD PRESSURE: 83 MMHG | HEIGHT: 73 IN | RESPIRATION RATE: 16 BRPM | OXYGEN SATURATION: 99 % | HEART RATE: 73 BPM | TEMPERATURE: 98 F | BODY MASS INDEX: 37.97 KG/M2 | SYSTOLIC BLOOD PRESSURE: 169 MMHG | WEIGHT: 286.5 LBS

## 2025-04-19 LAB
BACTERIA SPEC AEROBE CULT: ABNORMAL
BACTERIA SPEC AEROBE CULT: NORMAL
BACTERIA SPEC AEROBE CULT: NORMAL
GLUCOSE BLDC GLUCOMTR-MCNC: 212 MG/DL (ref 70–130)
GLUCOSE BLDC GLUCOMTR-MCNC: 236 MG/DL (ref 70–130)
GRAM STN SPEC: ABNORMAL
GRAM STN SPEC: ABNORMAL
VANCOMYCIN TROUGH SERPL-MCNC: 11.1 MCG/ML (ref 5–20)

## 2025-04-19 PROCEDURE — 80202 ASSAY OF VANCOMYCIN: CPT | Performed by: INTERNAL MEDICINE

## 2025-04-19 PROCEDURE — 99232 SBSQ HOSP IP/OBS MODERATE 35: CPT | Performed by: INTERNAL MEDICINE

## 2025-04-19 PROCEDURE — 25010000002 VANCOMYCIN 2-0.9 GM/500ML-% SOLUTION: Performed by: ORTHOPAEDIC SURGERY

## 2025-04-19 PROCEDURE — 82948 REAGENT STRIP/BLOOD GLUCOSE: CPT

## 2025-04-19 PROCEDURE — 63710000001 INSULIN LISPRO (HUMAN) PER 5 UNITS: Performed by: ORTHOPAEDIC SURGERY

## 2025-04-19 PROCEDURE — 63710000001 INSULIN GLARGINE PER 5 UNITS: Performed by: HOSPITALIST

## 2025-04-19 RX ORDER — SULFAMETHOXAZOLE AND TRIMETHOPRIM 800; 160 MG/1; MG/1
1 TABLET ORAL EVERY 12 HOURS SCHEDULED
Qty: 14 TABLET | Refills: 0 | Status: SHIPPED | OUTPATIENT
Start: 2025-04-19 | End: 2025-04-23 | Stop reason: SDUPTHER

## 2025-04-19 RX ORDER — SULFAMETHOXAZOLE AND TRIMETHOPRIM 800; 160 MG/1; MG/1
1 TABLET ORAL EVERY 12 HOURS SCHEDULED
Status: DISCONTINUED | OUTPATIENT
Start: 2025-04-19 | End: 2025-04-19 | Stop reason: HOSPADM

## 2025-04-19 RX ORDER — OXYCODONE AND ACETAMINOPHEN 5; 325 MG/1; MG/1
2 TABLET ORAL EVERY 8 HOURS PRN
Qty: 21 TABLET | Refills: 0 | Status: SHIPPED | OUTPATIENT
Start: 2025-04-19

## 2025-04-19 RX ADMIN — INSULIN LISPRO 8 UNITS: 100 INJECTION, SOLUTION INTRAVENOUS; SUBCUTANEOUS at 08:33

## 2025-04-19 RX ADMIN — INSULIN LISPRO 8 UNITS: 100 INJECTION, SOLUTION INTRAVENOUS; SUBCUTANEOUS at 12:50

## 2025-04-19 RX ADMIN — Medication 2000 MG: at 06:27

## 2025-04-19 RX ADMIN — INSULIN GLARGINE 15 UNITS: 100 INJECTION, SOLUTION SUBCUTANEOUS at 08:33

## 2025-04-19 RX ADMIN — Medication 10 ML: at 09:57

## 2025-04-19 RX ADMIN — OXYCODONE HYDROCHLORIDE AND ACETAMINOPHEN 1 TABLET: 5; 325 TABLET ORAL at 08:33

## 2025-04-19 NOTE — PROGRESS NOTES
"Pharmacy Dosing Service  Pharmacokinetics  Vancomycin Follow-up Evaluation    Assessment/Action/Plan:  Current Order: Vancomycin 2,000 mg IVPB every 12 hours  Current end date/final dose: 4/21/25 at 1800  Additional antimicrobial agent(s): None    Vancomycin trough = 11.1 (~11.57 hours post dose). Renal function stable. Continue current dose and frequency. Pharmacy will continue to follow daily and adjust dose accordingly.     Subjective:  James Mar is a 49 y.o. male currently on Vancomycin 2,000 mg IV every 12 hours for the treatment of Bone/Joint Infection (Osteomyelitis of right index finger)(I&D done 4/16/25), day 6 of treatment.    AUC Model Data - Amelie model:  Regimen: 2000 mg IV every 12 hours.  Start time: 06:06 on 04/19/2025  Exposure target: AUC24 (range)400-600 mg/L.hr   AUC24,ss: 479 mg/L.hr  Probability of AUC24 > 400: 89 %  Ctrough,ss: 10.9 mg/L  Probability of Ctrough,ss > 20: 0 %  Probability of nephrotoxicity (Lodise GEOVANY 2009): 6 %    Objective:  Ht: 185.4 cm (73\"); Wt: 130 kg (286 lb 8 oz)  Estimated Creatinine Clearance: 148.7 mL/min (by C-G formula based on SCr of 0.84 mg/dL).   Creatinine   Date Value Ref Range Status   04/18/2025 0.84 0.76 - 1.27 mg/dL Final   04/17/2025 0.85 0.76 - 1.27 mg/dL Final   04/16/2025 0.82 0.76 - 1.27 mg/dL Final      Lab Results   Component Value Date    WBC 7.21 04/18/2025    WBC 6.47 04/16/2025    WBC 7.36 04/15/2025         Lab Results   Component Value Date    VANCOTROUGH 11.10 04/19/2025       Culture Results:  Microbiology Results (last 10 days)       Procedure Component Value - Date/Time    Wound Culture - Surgical Site, Hand, Digit Right [[id number]]  (Abnormal) Collected: 04/16/25 1637    Lab Status: Preliminary result Specimen: Surgical Site from Hand, Digit Right Updated: 04/18/25 0614     Wound Culture Heavy growth (4+) Staphylococcus aureus, MRSA     Comment:   Methicillin resistant Staphylococcus aureus, Patient may be an isolation risk.    "     Gram Stain Rare (1+) WBCs per low power field      Few (2+) Gram positive cocci    MRSA Screen, PCR (Inpatient) - Swab, Nares [[id number]]  (Abnormal) Collected: 04/14/25 2039    Lab Status: Final result Specimen: Swab from Nares Updated: 04/14/25 2233     MRSA PCR MRSA Detected    Narrative:      The negative predictive value of this diagnostic test is high and should only be used to consider de-escalating anti-MRSA therapy. A positive result may indicate colonization with MRSA and must be correlated clinically.    Blood Culture - Blood, Arm, Left [[id number]]  (Normal) Collected: 04/14/25 1544    Lab Status: Preliminary result Specimen: Blood from Arm, Left Updated: 04/18/25 1600     Blood Culture No growth at 4 days    Blood Culture - Blood, Hand, Left [[id number]]  (Normal) Collected: 04/14/25 1452    Lab Status: Preliminary result Specimen: Blood from Hand, Left Updated: 04/18/25 1600     Blood Culture No growth at 4 days            Jon Monge, PharmD   04/19/25 06:10 CDT

## 2025-04-19 NOTE — PROGRESS NOTES
Orthopedic Surgery Progress Note    James Mar  4/19/2025      Subjective:     No acute events overnight.  Feels that his finger is improving in terms of pain and range of motion.  Denies any other joint pain, fever or chills.    Objective:     Patient Vitals for the past 24 hrs:   BP Temp Temp src Pulse Resp SpO2   04/19/25 0422 132/80 98.2 °F (36.8 °C) Oral 80 16 100 %   04/18/25 1959 133/74 98.8 °F (37.1 °C) Oral 93 16 98 %   04/18/25 1656 127/67 98 °F (36.7 °C) Oral 87 16 97 %   04/18/25 1151 126/78 97.5 °F (36.4 °C) Oral 78 16 99 %       General: Awake, alert  Respiratory: Nonlabored  CV: Regular rate  Extremity: RUE: Dressing removed, stable edema and erythema to right index finger.  Decreased tenderness along the right index finger, improved active finger range of motion.  No significant tenderness to the remainder of the right hand or wrist.  Stable neurovascular exam to the right hand.      Data Review:  Lab Results (last 24 hours)       Procedure Component Value Units Date/Time    POC Glucose Once [[id number]]  (Abnormal) Collected: 04/19/25 0735    Specimen: Blood Updated: 04/19/25 0746     Glucose 236 mg/dL      Comment: : [id number] Darrell Duron ID: [id number]       Wound Culture - Surgical Site, Hand, Digit Right [[id number]]  (Abnormal)  (Susceptibility) Collected: 04/16/25 1637    Specimen: Surgical Site from Hand, Digit Right Updated: 04/19/25 0628     Wound Culture Heavy growth (4+) Staphylococcus aureus, MRSA     Comment:   Methicillin resistant Staphylococcus aureus, Patient may be an isolation risk.        Gram Stain Rare (1+) WBCs per low power field      Few (2+) Gram positive cocci    Susceptibility        Staphylococcus aureus, MRSA      SHAUN      Clindamycin Susceptible      Erythromycin Resistant      Oxacillin Resistant      Rifampin Susceptible      Tetracycline Susceptible      Trimethoprim + Sulfamethoxazole Susceptible      Vancomycin Susceptible                            Vancomycin, Trough Please collect 30-60 minutes prior to dose due 04/19/25 at 0600 [[id number]]  (Normal) Collected: 04/19/25 0458    Specimen: Blood Updated: 04/19/25 0604     Vancomycin Trough 11.10 mcg/mL     POC Glucose Once [[id number]]  (Abnormal) Collected: 04/18/25 2147    Specimen: Blood Updated: 04/18/25 2158     Glucose 269 mg/dL      Comment: : [id number] Morris SosaMeter ID: [id number]       POC Glucose Once [[id number]]  (Abnormal) Collected: 04/18/25 1701    Specimen: Blood Updated: 04/18/25 1712     Glucose 215 mg/dL      Comment: : [email or web address] Bulmaro LydiaMeter ID: [id number]       Blood Culture - Blood, Arm, Left [[id number]]  (Normal) Collected: 04/14/25 1544    Specimen: Blood from Arm, Left Updated: 04/18/25 1600     Blood Culture No growth at 4 days    Blood Culture - Blood, Hand, Left [[id number]]  (Normal) Collected: 04/14/25 1452    Specimen: Blood from Hand, Left Updated: 04/18/25 1600     Blood Culture No growth at 4 days    POC Glucose Once [[id number]]  (Abnormal) Collected: 04/18/25 1153    Specimen: Blood Updated: 04/18/25 1205     Glucose 225 mg/dL      Comment: : [email or web address] Allouez LydiaMeter ID: [id number]                   Assessment:     48 y/o RHD M POD#3 s/p R index finger excisional debridement and irrigation, poorly controlled type 2 diabetes mellitus, history of prior MRSA infection, hypertension, coronary artery disease, hyperlipidemia and history of tobacco use    Plan:     1: Right hand: Appears to be improving clinically.  He feels comfortable being discharged home today with daily dressing changes and follow-up next week in clinic.  Discussed signs and symptoms that should prompt a return to clinic sooner or the ER.  Encourage elevation and range of motion of the right hand.  Will be discharged home on oral antibiotics per infectious disease. Again discussed the importance of tight blood sugar control.          Luis Miguel Sahni MD
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
## 2025-04-19 NOTE — PROGRESS NOTES
[facility] Medicine Services  INPATIENT PROGRESS NOTE    Patient Name: James Mar  Date of Admission: 4/14/2025  Today's Date: 04/19/25  Length of Stay: 5  Primary Care Physician: Radha Mortensen APRN    Subjective   Primary Historian: patient      Chief Complaint: R hand erythema edema drainage      Wound Infection  Abscess     49-year-old male with PMH of morbid obesity ? MARYBEL. DM  , HTN , CAD s/p stenting who states that he has an infection of his right index finger. He states that has been going on for about a week and getting worse. He states that he is not able to bend his finger anymore. He states that the swelling and redness is getting worse. He is on Bactrim. But no improvement of his symptoms. He has tried to drain it himself with no significant amount of drainage. Patient denies any other symptoms at this time   In ED wbc -ve, R hand xray ? Osteo 2nd distal phalanx, AG elevated ED did not feel the patient is in DKA, BC obtained started on vanco, will adm,it continue zosyn, vanco, SSI high, IVF check A1c, consult ID, ortho, scds for DVT prophylaxis, identify reconcile restart home meds once reconciled  4/15  For history of morbid obesity questionable sleep apnea history of diabetes hypertension coronary artery disease status post stenting A1c is 10.7 presented with the worsening of right hand infection questionable osteomyelitis of the second distal phalanx placed on vancomycin and Zosyn ID consulted and so was orthopedic surgery Recommending MRI to assess for potential deeper infection and more proximal involvement.  Will plan for incision and drainage of right index finger tomorrow.  Was having bradycardia asymptomatic at night during sleep strongly suspect sleep apnea check his TSH MRSA screen was positive  Blood cultures remain pending his anion gap has closed we did not need any DKA protocol and his blood sugar is much better educated that he needs to be  started on insulin  4/16  As before MRI of the right hand showing Findings suspicious for osteomyelitis of the distal second phalanx  with marrow edema and low T1 signal in the distal phalanx as well as  extensive adjacent soft tissue swelling and edema appreciate ID remains on Vanco and Zosyn appreciate Ortho possible OR today  4/17  Postop day #1 status post I&D of right index finger paronychial/eponychial/hyponychial infection as before A1c 10.7 blood sugar has been elevated started him on Lantus appreciate  ID remains on Zosyn vancomycin awaiting cultures sensitivities  4/18  As before intraoperative wound culture showing positive MRSA continue IV antibiotics blood sugar is getting better patient has been educated remains on Zosyn vancomycin appreciate ID   4/19  Appreciate orthopedic surgery status post I&D of the right index finger orthopedic surgery have cleared him to be discharged follow-up as an outpatient with ID patient was transitioned to Bactrim patient is to follow-up with ID in 4 days from now  Review of Systems   All other systems reviewed and are negative.     All pertinent negatives and positives are as above. All other systems have been reviewed and are negative unless otherwise stated.     Objective    Temp:  [97.5 °F (36.4 °C)-98.8 °F (37.1 °C)] 98.1 °F (36.7 °C)  Heart Rate:  [78-93] 78  Resp:  [16] 16  BP: (126-144)/(67-94) 144/94  Physical Exam  HENT:      Head: Normocephalic.      Nose: Nose normal.   Eyes:      Pupils: Pupils are equal, round, and reactive to light.   Cardiovascular:      Rate and Rhythm: Normal rate.   Pulmonary:      Breath sounds: Wheezing present.   Abdominal:      General: Abdomen is flat.   Musculoskeletal:      Cervical back: Normal range of motion.      Comments: Right hand cellulitis erythema edema   Neurological:      Mental Status: He is alert.             Results Review:  I have reviewed the labs, radiology results, and diagnostic studies.    Laboratory Data:  "  Results from last 7 days   Lab Units 04/18/25  0224 04/16/25  0456 04/15/25  0235   WBC 10*3/mm3 7.21 6.47 7.36   HEMOGLOBIN g/dL 13.1 14.8 14.4   HEMATOCRIT % 39.4 45.1 42.8   PLATELETS 10*3/mm3 241 260 282        Results from last 7 days   Lab Units 04/18/25  0223 04/17/25  0501 04/16/25  0456 04/14/25  1707 04/14/25  1452   SODIUM mmol/L 139 136 135*   < > 135*  135*   POTASSIUM mmol/L 3.8 4.9 4.4   < > 4.0  4.0   CHLORIDE mmol/L 101 99 98   < > 97*  97*   CO2 mmol/L 26.0 23.0 24.0   < > 18.0*  21.0*   BUN mg/dL 14 14 10   < > 17  16   CREATININE mg/dL 0.84 0.85 0.82   < > 1.22  1.20   CALCIUM mg/dL 8.6 9.3 9.1   < > 9.9  9.8   BILIRUBIN mg/dL 0.2  --   --   --  0.3   ALK PHOS U/L 52  --   --   --  68   ALT (SGPT) U/L 23  --   --   --  32   AST (SGOT) U/L 13  --   --   --  26   GLUCOSE mg/dL 217* 299* 273*   < > 331*  327*    < > = values in this interval not displayed.       Culture Data:   No results found for: \"BLOODCX\", \"URINECX\", \"WOUNDCX\", \"MRSACX\", \"RESPCX\", \"STOOLCX\"    Radiology Data:   Imaging Results (Last 24 Hours)       ** No results found for the last 24 hours. **            I have reviewed the patient's current medications.     Assessment/Plan   Assessment  Active Hospital Problems    Diagnosis     Acute paronychia of finger of right hand        Treatment Plan  PMH of morbid obesity ? MARYBEL. DM  , HTN who states that he has an infection of his right index finger. He states that has been going on for about a week and getting worse. He states that he is not able to bend his finger anymore. He states that the swelling and redness is getting worse. He is on Bactrim. But no improvement of his symptoms. He has tried to drain it himself with no significant amount of drainage. Patient denies any other symptoms at this time   In ED wbc -ve, R hand xray ? Osteo 2nd distal phalanx, AG elevated ED did not feel the patient is in DKA, BC obtained started on vanco, will adm,it continue zosyn, vanco, SSI " high, IVF check A1c, consult ID, ortho, scds for DVT prophylaxis, identify reconcile restart home meds once reconciled tristen Nelson.      Medical Decision Making  Number and Complexity of problems: 3 acute   Differential Diagnosis: as above      Conditions and Status        Condition is at treatment goal.     [facility] Data  External documents reviewed: yes  Cardiac tracing (EKG, telemetry) interpretation: yes  Radiology interpretation: yes  Labs reviewed: yes  Any tests that were considered but not ordered: no     Decision rules/scores evaluated (example [id number], Wells, etc): no     Discussed with: ED     Care Planning  Shared decision making: Patient apprised of current labs, vitals, imaging and treatment plan.  They are agreeable with proceeding with plans as discussed.   Code status and discussions: full      Disposition  Social Determinants of Health that impact treatment or disposition: no  Estimated length of stay is TBD.      I confirmed that the patient's advanced care plan is present, code status is documented, and a surrogate decision maker is listed in the patient's medical record        Electronically signed by Ashanti Whatley MD, 04/19/25, 10:33 CDT.
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
## 2025-04-19 NOTE — PLAN OF CARE
Problem: Adult Inpatient Plan of Care  Goal: Plan of Care Review  Outcome: Met  Flowsheets (Taken 4/19/2025 1431)  Outcome Evaluation: Patient RA. IV CDI, IV abx infusing per order. Up adlib, voiding. Wound drsg changed per MD. VSS, safety maintained.

## 2025-04-19 NOTE — PROGRESS NOTES
"INFECTIOUS DISEASES PROGRESS NOTE    Patient:  James Mar  YOB: 1975  MRN: [id number]   Admit date: 2025   Admitting Physician: Ashanti Whatley MD  Primary Care Physician: Radha Mortensen APRN    Chief Complaint: Infected finger      Interval History: Patient offers no new complaints.  He does not have a current primary clinician and would like to see someone at [facility].  He lives in [place].    Allergies: No Known Allergies    Current Scheduled Medications:   insulin glargine, 15 Units, Subcutaneous, Q12H  insulin lispro, 4-24 Units, Subcutaneous, 4x Daily AC & at Bedtime  sodium chloride, 10 mL, Intravenous, Q12H  vancomycin, 2,000 mg, Intravenous, Q12H      Current PRN Medications:    acetaminophen **OR** acetaminophen **OR** acetaminophen    senna-docusate sodium **AND** polyethylene glycol **AND** bisacodyl **AND** bisacodyl    Calcium Replacement - Follow Nurse / BPA Driven Protocol    dextrose    dextrose    glucagon (human recombinant)    Magnesium Standard Dose Replacement - Follow Nurse / BPA Driven Protocol    [DISCONTINUED] Morphine **AND** naloxone    ondansetron    oxyCODONE-acetaminophen    Phosphorus Replacement - Follow Nurse / BPA Driven Protocol    Potassium Replacement - Follow Nurse / BPA Driven Protocol    [COMPLETED] Insert Peripheral IV **AND** sodium chloride    sodium chloride    sodium chloride            Objective     Vital Signs:  Temp (24hrs), Av.1 °F (36.7 °C), Min:97.5 °F (36.4 °C), Max:98.8 °F (37.1 °C)      /80 (BP Location: Left arm, Patient Position: Lying)   Pulse 80   Temp 98.2 °F (36.8 °C) (Oral)   Resp 16   Ht 185.4 cm (73\")   Wt 130 kg (286 lb 8 oz)   SpO2 100%   BMI 37.80 kg/m²         Physical Exam:  General: The patient was sitting up in bed in no acute distress.  Dr. Sahni was at bedside unwrapping dressing.  Right index finger with edema and some erythema essentially stable per report.  No significant tenderness when Dr." Sahni was palpating the hand or finger.  Very small amount of granulation tissue noted distally.  Patient did have some increased mobility of the finger.  Psych: Pleasant cooperative        Preop photos                  Results Review:    I reviewed the patient's new clinical results.    Lab Results:    CBC:   Lab Results   Lab 04/14/25  1452 04/14/25 1707 04/15/25  0235 04/16/25 0456 04/18/25  0224   WBC 8.11 8.17 7.36 6.47 7.21   HEMOGLOBIN 15.4 15.1 14.4 14.8 13.1   HEMATOCRIT 45.9 45.1 42.8 45.1 39.4   PLATELETS 282 276 282 260 241        AutoDiff:   Lab Results   Lab 04/15/25  0235 04/16/25  0456 04/18/25  0224   NEUTROPHIL % 58.0 60.2 58.6   LYMPHOCYTE % 31.0 30.0 32.5   MONOCYTES % 7.7 6.2 6.5   EOSINOPHIL % 1.8 1.4 1.0   BASOPHIL % 1.0 1.7* 1.0   NEUTROS ABS 4.27 3.90 4.23   LYMPHS ABS 2.28 1.94 2.34   MONOS ABS 0.57 0.40 0.47   EOS ABS 0.13 0.09 0.07   BASOS ABS 0.07 0.11 0.07        Manual Diff:    Lab Results   Lab 04/15/25  0235 04/16/25  0456 04/18/25  0224   NEUTROS ABS 4.27 3.90 4.23           CMP:   Lab Results   Lab 04/14/25  1452 04/14/25  1707 04/16/25 0456 04/17/25  0501 04/18/25  0223   SODIUM 135*  135*   < > 135* 136 139   POTASSIUM 4.0  4.0   < > 4.4 4.9 3.8   CHLORIDE 97*  97*   < > 98 99 101   CO2 18.0*  21.0*   < > 24.0 23.0 26.0   BUN 17  16   < > 10 14 14   CREATININE 1.22  1.20   < > 0.82 0.85 0.84   CALCIUM 9.9  9.8   < > 9.1 9.3 8.6   BILIRUBIN 0.3  --   --   --  0.2   ALK PHOS 68  --   --   --  52   ALT (SGPT) 32  --   --   --  23   AST (SGOT) 26  --   --   --  13   GLUCOSE 331*  327*   < > 273* 299* 217*    < > = values in this interval not displayed.       Estimated Creatinine Clearance: 148.7 mL/min (by C-G formula based on SCr of 0.84 mg/dL).        Culture Results:    Microbiology Results (last 10 days)       Procedure Component Value - Date/Time    Wound Culture - Surgical Site, Hand, Digit Right [[id number]]  (Abnormal)  (Susceptibility) Collected: 04/16/25 7123     Lab Status: Final result Specimen: Surgical Site from Hand, Digit Right Updated: 04/19/25 0628     Wound Culture Heavy growth (4+) Staphylococcus aureus, MRSA     Comment:   Methicillin resistant Staphylococcus aureus, Patient may be an isolation risk.        Gram Stain Rare (1+) WBCs per low power field      Few (2+) Gram positive cocci    Susceptibility        Staphylococcus aureus, MRSA      SHAUN      Clindamycin Susceptible      Erythromycin Resistant      Oxacillin Resistant      Rifampin Susceptible      Tetracycline Susceptible      Trimethoprim + Sulfamethoxazole Susceptible      Vancomycin Susceptible                           MRSA Screen, PCR (Inpatient) - Swab, Nares [[id number]]  (Abnormal) Collected: 04/14/25 2039    Lab Status: Final result Specimen: Swab from Nares Updated: 04/14/25 2233     MRSA PCR MRSA Detected    Narrative:      The negative predictive value of this diagnostic test is high and should only be used to consider de-escalating anti-MRSA therapy. A positive result may indicate colonization with MRSA and must be correlated clinically.    Blood Culture - Blood, Arm, Left [[id number]]  (Normal) Collected: 04/14/25 1544    Lab Status: Preliminary result Specimen: Blood from Arm, Left Updated: 04/18/25 1600     Blood Culture No growth at 4 days    Blood Culture - Blood, Hand, Left [[id number]]  (Normal) Collected: 04/14/25 1452    Lab Status: Preliminary result Specimen: Blood from Hand, Left Updated: 04/18/25 1600     Blood Culture No growth at 4 days                 Radiology:   Imaging Results (Last 72 Hours)       ** No results found for the last 72 hours. **                Active Hospital Problems    Diagnosis     Acute paronychia of finger of right hand        IMPRESSION:  MRSA infection right index finger after presentation with paronychia/soft tissue abscess.  Patient underwent irrigation and debridement per Dr. Sahni.  Patient underwent dressing change per Dr. Sahni today.  Status  post irrigation and debridement per Dr. Sahni  Uncontrolled diabetes mellitus with hemoglobin A1c 9.3 on April 14, 2025.  Patient aware of increased hemoglobin A1c and needs a new primary clinician  Questionable osteo on x-rays.  Discussed with Dr. Sahni.  Plan to monitor closely-he will see weekly at first..      RECOMMENDATION:   Discontinue vancomycin  Transition to oral Bactrim-discussed with Dr. Sahni after dressing change when he felt like switching to Bactrim and discharge reasonable.  Glucose control per hospitalist  Patient would like to follow-up with [facility] primary care provider  Follow-up in discharge section place for Dr. Liang for April 23 at 12:45 PM    Shannon Smith MD  04/19/25  08:45 CDT

## 2025-04-20 LAB — BACTERIA SPEC ANAEROBE CULT: NORMAL

## 2025-04-20 NOTE — OUTREACH NOTE
Prep Survey      Flowsheet Row Responses   [facility] facility patient discharged from? [facility]   Is LACE score < 7 ? No   Eligibility Readm Mgmt   Discharge diagnosis Osteomyelitis-Right index finger incision and drainage   Does the patient have one of the following disease processes/diagnoses(primary or secondary)? General Surgery   Does the patient have Home health ordered? No   Is there a DME ordered? No   Prep survey completed? Yes            DREW SHAVER - Registered Nurse

## 2025-04-22 LAB — BACTERIA SPEC ANAEROBE CULT: NORMAL

## 2025-04-23 ENCOUNTER — OFFICE VISIT (OUTPATIENT)
Age: 50
End: 2025-04-23
Payer: COMMERCIAL

## 2025-04-23 VITALS
OXYGEN SATURATION: 97 % | HEART RATE: 91 BPM | SYSTOLIC BLOOD PRESSURE: 134 MMHG | WEIGHT: 265.8 LBS | DIASTOLIC BLOOD PRESSURE: 88 MMHG | BODY MASS INDEX: 35.23 KG/M2 | HEIGHT: 73 IN | TEMPERATURE: 98.3 F

## 2025-04-23 DIAGNOSIS — S60.418D: Primary | ICD-10-CM

## 2025-04-23 DIAGNOSIS — A49.02 MRSA (METHICILLIN RESISTANT STAPHYLOCOCCUS AUREUS) INFECTION: ICD-10-CM

## 2025-04-23 DIAGNOSIS — Z72.0 TOBACCO USE: ICD-10-CM

## 2025-04-23 LAB — FUNGUS WND CULT: NORMAL

## 2025-04-23 PROCEDURE — 3079F DIAST BP 80-89 MM HG: CPT | Performed by: INTERNAL MEDICINE

## 2025-04-23 PROCEDURE — 3075F SYST BP GE 130 - 139MM HG: CPT | Performed by: INTERNAL MEDICINE

## 2025-04-23 PROCEDURE — 99214 OFFICE O/P EST MOD 30 MIN: CPT | Performed by: INTERNAL MEDICINE

## 2025-04-23 RX ORDER — SULFAMETHOXAZOLE AND TRIMETHOPRIM 800; 160 MG/1; MG/1
1 TABLET ORAL EVERY 12 HOURS SCHEDULED
Qty: 14 TABLET | Refills: 0 | Status: SHIPPED | OUTPATIENT
Start: 2025-04-23 | End: 2025-04-30

## 2025-04-23 NOTE — PROGRESS NOTES
[facility] [place] [facility] - Infectious Diseases Progress Note    Patient:  James Mar  YOB: 1975  MRN: [id number]   Primary Care Physician: Provider, No Known  Referring Physician: No ref. provider found     Chief Complaint: finger hurts     Interval History/HPI:  Hospital follow-up of MRSA infection right index finger   Here today for follow-up.  He had had infection of the distal aspect of the right index finger.  Underwent incision and drainage.  Culture grew MRSA.  Remains on treatment with Bactrim.  Tolerating Bactrim without side effects.  No nausea, diarrhea, or rash.  Both he and his wife feel the finger is improving.  He does indicate it is quite uncomfortable to go through dressing changes.  He continues to smoke tobacco.  Strongly recommended tobacco cessation.  Explained tobacco use could impair wound healing particularly at the distal aspect of the digit.    Allergies: No Known Allergies      Current Scheduled Medications:   Current Outpatient Medications on File Prior to Visit   Medication Sig    aspirin 81 MG EC tablet Take 1 tablet by mouth Daily.    atorvastatin (LIPITOR) 80 MG tablet Take 1 tablet by mouth Daily.    insulin glargine (LANTUS, SEMGLEE) 100 UNIT/ML injection Inject 15 Units under the skin into the appropriate area as directed Every 12 (Twelve) Hours.    losartan (COZAAR) 50 MG tablet Take 1 tablet by mouth Daily.    metoprolol succinate XL (TOPROL-XL) 50 MG 24 hr tablet Take 1 tablet by mouth Daily.    oxyCODONE-acetaminophen (PERCOCET) 5-325 MG per tablet Take 2 tablets by mouth Every 8 (Eight) Hours As Needed for Moderate Pain.    prasugrel (EFFIENT) 10 MG tablet Take 1 tablet by mouth Daily.    Semaglutide, 1 MG/DOSE, (Ozempic, 1 MG/DOSE,) 4 MG/3ML solution pen-injector Inject 1 mg under the skin into the appropriate area as directed 1 (One) Time Per Week.    [DISCONTINUED] sulfamethoxazole-trimethoprim (BACTRIM DS,SEPTRA DS) 800-160 MG per tablet Take 1 tablet by mouth Every 12  "(Twelve) Hours for 14 doses. Indications: Infection of the Skin and/or Soft Tissue    [DISCONTINUED] tiZANidine (ZANAFLEX) 4 MG tablet Take 1 tablet by mouth Every 8 (Eight) Hours As Needed for Muscle Spasms. (Patient not taking: Reported on 4/23/2025)     No current facility-administered medications on file prior to visit.      Venous Access Review  Line/IV site: No current IV Access    Antimicrobial Review  Currently on antibiotics/antifungals: YES/NO: YES  Start Date of Therapy: 04- - present: Bactrim DS   If therapy completed, date complete: NA  Estimated end of treatment date (EOT): 4/26/2025    Review of Systems See HPI.    Vital Signs:  /88 Comment: MANUAL LEFT ARM  Pulse 91   Temp 98.3 °F (36.8 °C) (Temporal)   Ht 185.4 cm (73\")   Wt 121 kg (265 lb 12.8 oz)   SpO2 97%   BMI 35.07 kg/m²     Physical Exam  Vital signs - reviewed.  Pleasant and in no distress  Reviewed pictures in both the patient's cell phone and his wife's cell phone  The pictures that they are showing from the index finger is indicating improvement from when he was in the hospital    Lab/Imaging/Other Information:  Cultures from when he was in the hospital reviewed.  MRSA recovered with the susceptibility outlined below  Reviewed additional labs from when he was in the hospital and his creatinine on the 18th near the time of discharge was normal at 0.8.  His potassium was normal at 3.8 as well.  Wound Culture - Surgical Site, Hand, Digit Right [[id number]]  (Abnormal)  (Susceptibility) Collected: 04/16/25 1637      Lab Status: Final result Specimen: Surgical Site from Hand, Digit Right Updated: 04/19/25 0628       Wound Culture Heavy growth (4+) Staphylococcus aureus, MRSA       Comment:    Methicillin resistant Staphylococcus aureus, Patient may be an isolation risk.          Gram Stain Rare (1+) WBCs per low power field         Few (2+) Gram positive cocci     Susceptibility                   Staphylococcus aureus, MRSA   "       SHAUN         Clindamycin Susceptible         Erythromycin Resistant         Oxacillin Resistant         Rifampin Susceptible         Tetracycline Susceptible         Trimethoprim + Sulfamethoxazole Susceptible         Vancomycin Susceptible                          Impression & Recommendations:   Diagnoses and all orders for this visit:    1. Abrasion of index finger, subsequent encounter (Primary)  -     sulfamethoxazole-trimethoprim (BACTRIM DS,SEPTRA DS) 800-160 MG per tablet; Take 1 tablet by mouth Every 12 (Twelve) Hours for 14 doses. Indications: Infection of the Skin and/or Soft Tissue  Dispense: 14 tablet; Refill: 0    2. MRSA (methicillin resistant Staphylococcus aureus) infection    3. Tobacco use    He had an abrasion/abscess right index finger.  Showing improvement following his incision and drainage, antibiotic treatment, and with local wound care.  I think it will help him tolerate dressing changes better if he soaks the hand in warm soapy water with Epsom salts to loosen the dressing at the time of dressing change.  He can then wash hands with warm soap and water and then apply a dressing which would be a petroleum gauze nonstick dressing followed by dry gauze and wrap.  He will continue with dressing changes daily or twice daily.  Would like to continue with Bactrim double strength 1 additional week.  He would like the prescription sent to [facility].  No follow-up appointment in 1 week.  They will call for earlier appointment if any new or worsening problems in the interim.  Strongly recommend tobacco cessation.    Follow Up:   There are no Patient Instructions on file for this visit.  No follow-ups on file.  Patient was provided After Visit Summary.     Jimmy Liang MD    CC: Luis Miguel Sahni MD

## 2025-04-24 ENCOUNTER — READMISSION MANAGEMENT (OUTPATIENT)
Dept: CALL CENTER | Facility: HOSPITAL | Age: 50
End: 2025-04-24
Payer: COMMERCIAL

## 2025-04-24 NOTE — OUTREACH NOTE
General Surgery Week 1 Survey      Flowsheet Row Responses   [facility] patient discharged from? [place]   Does the patient have one of the following disease processes/diagnoses(primary or secondary)? General Surgery   Week 1 attempt successful? Yes   Call start time 1652   Call end time 1657   Discharge diagnosis Osteomyelitis-Right index finger incision and drainage   Does the patient have all medications related to this admission filled (includes all antibiotics, pain medications, etc.) Yes   Is the patient taking all medications as directed (includes completed medication regime)? Yes   Does the patient have a follow up appointment scheduled with their surgeon? Yes   Comments Patient is following up with infectious disease   Psychosocial issues? No   Did the patient receive a copy of their discharge instructions? Yes   Nursing interventions Reviewed instructions with patient   What is the patient's perception of their health status since discharge? Improving   Nursing interventions Nurse provided patient education   Is the patient /caregiver able to teach back basic post-op care? Keep incision areas clean,dry and protected   Is the patient/caregiver able to teach back signs and symptoms of incisional infection? Increased redness, swelling or pain at the incisonal site, Increased drainage or bleeding, Incisional warmth, Pus or odor from incision, Fever   Is the patient/caregiver able to teach back the hierarchy of who to call/visit for symptoms/problems? PCP, Specialist, Home health nurse, Urgent Care, ED, 911 Yes   Week 1 call completed? Yes   Graduated Yes   Is the patient interested in additional calls from an ambulatory ? No   Would this patient benefit from a Referral to Amb Social Work? No   Wrap up additional comments Patient reports doing well. No questions at this time. PCP line phone number given to patient.   Call end time 1657            KIM KLEIN - Registered Nurse

## 2025-04-25 ENCOUNTER — PATIENT ROUNDING (BHMG ONLY) (OUTPATIENT)
Age: 50
End: 2025-04-25
Payer: COMMERCIAL

## 2025-04-25 NOTE — PROGRESS NOTES
Patient Rounding  [facility] Infectious Diseases - [facility] INFECT DISEASE PAD    Tried reaching Mr. Ramirez for patient rounding. The call was not answered.

## 2025-04-28 ENCOUNTER — OFFICE VISIT (OUTPATIENT)
Dept: CARDIOLOGY CLINIC | Age: 50
End: 2025-04-28
Payer: MEDICAID

## 2025-04-28 VITALS
OXYGEN SATURATION: 99 % | DIASTOLIC BLOOD PRESSURE: 82 MMHG | HEIGHT: 75 IN | SYSTOLIC BLOOD PRESSURE: 132 MMHG | HEART RATE: 90 BPM | WEIGHT: 263 LBS | BODY MASS INDEX: 32.7 KG/M2

## 2025-04-28 DIAGNOSIS — E78.5 DYSLIPIDEMIA: ICD-10-CM

## 2025-04-28 DIAGNOSIS — I25.10 CORONARY ARTERY DISEASE INVOLVING NATIVE CORONARY ARTERY OF NATIVE HEART WITHOUT ANGINA PECTORIS: Primary | ICD-10-CM

## 2025-04-28 DIAGNOSIS — I10 ESSENTIAL HYPERTENSION: ICD-10-CM

## 2025-04-28 PROCEDURE — 3017F COLORECTAL CA SCREEN DOC REV: CPT | Performed by: NURSE PRACTITIONER

## 2025-04-28 PROCEDURE — 4004F PT TOBACCO SCREEN RCVD TLK: CPT | Performed by: NURSE PRACTITIONER

## 2025-04-28 PROCEDURE — 3075F SYST BP GE 130 - 139MM HG: CPT | Performed by: NURSE PRACTITIONER

## 2025-04-28 PROCEDURE — 93000 ELECTROCARDIOGRAM COMPLETE: CPT | Performed by: NURSE PRACTITIONER

## 2025-04-28 PROCEDURE — 3079F DIAST BP 80-89 MM HG: CPT | Performed by: NURSE PRACTITIONER

## 2025-04-28 PROCEDURE — G8417 CALC BMI ABV UP PARAM F/U: HCPCS | Performed by: NURSE PRACTITIONER

## 2025-04-28 PROCEDURE — G8427 DOCREV CUR MEDS BY ELIG CLIN: HCPCS | Performed by: NURSE PRACTITIONER

## 2025-04-28 PROCEDURE — 99214 OFFICE O/P EST MOD 30 MIN: CPT | Performed by: NURSE PRACTITIONER

## 2025-04-28 ASSESSMENT — ENCOUNTER SYMPTOMS
WHEEZING: 0
SORE THROAT: 0
COUGH: 0
SHORTNESS OF BREATH: 0
CHEST TIGHTNESS: 0

## 2025-04-28 NOTE — PROGRESS NOTES
[facility] Cardiology   Established Patient Office Visit  [address]  [address]  [facility] [id number] [phone]        OFFICE VISIT:  2025    Duane Saunders - : 1975    Reason For Visit:  Duane is a 49 y.o. male who is here for 6 Month Follow-Up and Coronary Artery Disease    1. Coronary artery disease involving native coronary artery of native heart without angina pectoris    2. Essential hypertension    3. Dyslipidemia        Patient with a history of coronary artery disease with previous stent placements, hyperlipidemia.  Diabetes.     He is a patient of Dr. Ramirez.     Patient presents to clinic today patient denies any chest pain, pressure or tightness.  There is no shortness of breath, orthopnea or PND.  Patient denies any lightheadedness, dizziness or syncope.       Subjective    Duane Saunders is a 49 y.o. male with the following history as recorded in [facility]:    Patient Active Problem List    Diagnosis Date Noted    STEMI (ST elevation myocardial infarction) ([facility]) 2023    NSTEMI (non-ST elevation myocardial infarction) ([facility]) 2023    Diabetes ([facility]) 2023    Chest pain 02/10/2023    History of noncompliance with medical treatment 2025    Peripheral vascular disease 2024    Swelling 2024    Venous insufficiency 2024    Skin ulcer of left lower leg with fat layer exposed ([facility]) 2024    Thrombosis 2023    DVT (deep venous thrombosis) ([facility]) 10/27/2014     Current Outpatient Medications   Medication Sig Dispense Refill    atorvastatin (LIPITOR) 80 MG tablet Take 1 tablet by mouth nightly 30 tablet 0    losartan (COZAAR) 50 MG tablet Take 1 tablet by mouth daily 30 tablet 0    metoprolol succinate (TOPROL XL) 50 MG extended release tablet Take 1 tablet by mouth daily 30 tablet 0    Semaglutide, 1 MG/DOSE, (OZEMPIC, 1 MG/DOSE,) 4 MG/3ML SOPN sc injection Inject 1 mg into the skin every 7 days 3 mL 0    ASPIRIN LOW DOSE 81 MG EC tablet TAKE

## 2025-04-29 NOTE — PROGRESS NOTES
[facility] - Infectious Diseases Progress Note    Patient:  James Mar  YOB: 1975  MRN: [id number]   Primary Care Physician: Radha Mortensen APRN  Referring Physician: Ashanti Whatley MD     Chief Complaint:   Chief Complaint   Patient presents with    Abrasion of index finger, subsequent encounter     No concerns at this time.      Interval History/HPI:  MRSA infection right index finger    Here today for follow-up.  He had had MRSA soft tissue infection/abscess involving the right index finger.  He has completed an additional week of Bactrim treatment.  He tolerated the antibiotic treat without nausea, diarrhea, or rash.  The finger remains sore, but overall improving.  He does not have a primary care physician currently.  He is going to run out of his Lantus insulin tonight.  He indicates he has been taking 15 units every 12 hours.  He was going to try to find a primary care physician today to get a refill on his Lantus.  I am concerned that he will not be able to find one today.  We will send in a 2-week supply for him.  He does continue to smoke tobacco.  Recommended cessation.  He voiced understanding.    Allergies: No Known Allergies  Current Scheduled Medications:   Current Outpatient Medications on File Prior to Visit   Medication Sig    aspirin 81 MG EC tablet Take 1 tablet by mouth Daily.    atorvastatin (LIPITOR) 80 MG tablet Take 1 tablet by mouth Daily.    insulin glargine (LANTUS, SEMGLEE) 100 UNIT/ML injection Inject 15 Units under the skin into the appropriate area as directed Every 12 (Twelve) Hours.    losartan (COZAAR) 50 MG tablet Take 1 tablet by mouth Daily.    metoprolol succinate XL (TOPROL-XL) 50 MG 24 hr tablet Take 1 tablet by mouth Daily.    oxyCODONE-acetaminophen (PERCOCET) 5-325 MG per tablet Take 2 tablets by mouth Every 8 (Eight) Hours As Needed for Moderate Pain.    prasugrel (EFFIENT) 10 MG tablet Take 1 tablet by mouth Daily.    Semaglutide, 1 MG/DOSE,  "(Ozempic, 1 MG/DOSE,) 4 MG/3ML solution pen-injector Inject 1 mg under the skin into the appropriate area as directed 1 (One) Time Per Week.    sulfamethoxazole-trimethoprim (BACTRIM DS,SEPTRA DS) 800-160 MG per tablet Take 1 tablet by mouth Every 12 (Twelve) Hours for 14 doses. Indications: Infection of the Skin and/or Soft Tissue     No current facility-administered medications on file prior to visit.      Venous Access Review  Line/IV site: No current IV Access  Antimicrobial Review  Currently on antibiotics/antifungals: YES/NO: YES  Start Date of Therapy:04- - present: Bactrim DS   If therapy completed, date complete: NA  Estimated end of treatment date (EOT): 4/30/2025    Review of Systems See HPI.    Vital Signs:  /84   Pulse 86   Temp 98.2 °F (36.8 °C)   Resp 18   Ht 185.4 cm (73\")   Wt 122 kg (269 lb)   SpO2 97%   BMI 35.49 kg/m²     Physical Exam  Vital signs - reviewed.  Right index finger showing improvement  See pictures outlined in epic    Lab/Imaging/Other Information: None    Impression & Recommendations:   Diagnoses and all orders for this visit:    1. MRSA (methicillin resistant Staphylococcus aureus) infection (Primary)    2. Other specified diabetes mellitus with diabetic neuropathy, unspecified whether long term insulin use  -     Insulin Glargine (LANTUS SOLOSTAR) 100 UNIT/ML injection pen; Inject 15 Units under the skin into the appropriate area as directed Every 12 (Twelve) Hours for 30 days.  Dispense: 9 mL; Refill: 0    3. Tobacco use    Feel antibiotic treatment can be discontinued at this point.  Will stop Bactrim treatment.  He has completed his course of therapy.  To help provide him some additional time until he establishes a new primary care physician, I went ahead and prescribed Lantus at the dose that was prescribed for him by the hospitalist when he left the hospital.  He is currently taking Lantus 15 units subcu every 12 hours.  Refill for a 1 month supply sent " for him today.  He will continue to keep the end of the finger protected until it is healed completely.  He will wash and keep it clean.  Offered ongoing follow-up.  He felt comfortable following up with his hand surgeon, primary care when he establishes a new appointment within the next few weeks and otherwise following up with infectious diseases as needed.  I would be happy to reassess if no ongoing improvement or new problems develop.  He was comfortable with that plan.  In addition had recommended tobacco cessation when he was in the hospital.  Reiterated again today recommendations for tobacco cessation.    Follow Up:   There are no Patient Instructions on file for this visit.  Return if symptoms worsen or fail to improve.  Patient was provided After Visit Summary.     Jimmy Liang MD      CC: MD Radha Villarreal APRN

## 2025-04-30 ENCOUNTER — OFFICE VISIT (OUTPATIENT)
Age: 50
End: 2025-04-30
Payer: COMMERCIAL

## 2025-04-30 VITALS
SYSTOLIC BLOOD PRESSURE: 139 MMHG | HEIGHT: 73 IN | BODY MASS INDEX: 35.65 KG/M2 | WEIGHT: 269 LBS | OXYGEN SATURATION: 97 % | TEMPERATURE: 98.2 F | RESPIRATION RATE: 18 BRPM | DIASTOLIC BLOOD PRESSURE: 84 MMHG | HEART RATE: 86 BPM

## 2025-04-30 DIAGNOSIS — Z72.0 TOBACCO USE: ICD-10-CM

## 2025-04-30 DIAGNOSIS — E13.40 OTHER SPECIFIED DIABETES MELLITUS WITH DIABETIC NEUROPATHY, UNSPECIFIED WHETHER LONG TERM INSULIN USE: ICD-10-CM

## 2025-04-30 DIAGNOSIS — A49.02 MRSA (METHICILLIN RESISTANT STAPHYLOCOCCUS AUREUS) INFECTION: Primary | ICD-10-CM

## 2025-04-30 LAB — FUNGUS WND CULT: NORMAL

## 2025-04-30 PROCEDURE — 3079F DIAST BP 80-89 MM HG: CPT | Performed by: INTERNAL MEDICINE

## 2025-04-30 PROCEDURE — 99214 OFFICE O/P EST MOD 30 MIN: CPT | Performed by: INTERNAL MEDICINE

## 2025-04-30 PROCEDURE — 3075F SYST BP GE 130 - 139MM HG: CPT | Performed by: INTERNAL MEDICINE

## 2025-05-07 LAB — FUNGUS WND CULT: NORMAL

## 2025-05-14 LAB — FUNGUS WND CULT: NORMAL

## 2025-05-21 LAB — FUNGUS WND CULT: NORMAL

## 2025-05-27 LAB — FUNGUS WND CULT: NORMAL

## 2025-07-17 DIAGNOSIS — E13.40 OTHER SPECIFIED DIABETES MELLITUS WITH DIABETIC NEUROPATHY, UNSPECIFIED WHETHER LONG TERM INSULIN USE: ICD-10-CM

## 2025-07-17 RX ORDER — INSULIN GLARGINE 100 [IU]/ML
INJECTION, SOLUTION SUBCUTANEOUS
OUTPATIENT
Start: 2025-07-17

## 2025-07-20 ENCOUNTER — APPOINTMENT (OUTPATIENT)
Dept: GENERAL RADIOLOGY | Facility: HOSPITAL | Age: 50
End: 2025-07-20
Payer: COMMERCIAL

## 2025-07-20 ENCOUNTER — HOSPITAL ENCOUNTER (OUTPATIENT)
Facility: HOSPITAL | Age: 50
Setting detail: OBSERVATION
Discharge: HOME | End: 2025-07-21
Attending: FAMILY MEDICINE | Admitting: INTERNAL MEDICINE
Payer: COMMERCIAL

## 2025-07-20 DIAGNOSIS — I20.0 UNSTABLE ANGINA: ICD-10-CM

## 2025-07-20 DIAGNOSIS — R06.02 SHORTNESS OF BREATH: Primary | ICD-10-CM

## 2025-07-20 PROBLEM — Z91.199 HISTORY OF NONCOMPLIANCE WITH MEDICAL TREATMENT: Status: ACTIVE | Noted: 2025-04-01

## 2025-07-20 PROBLEM — Z72.0 TOBACCO ABUSE: Status: ACTIVE | Noted: 2025-07-20

## 2025-07-20 LAB
ALBUMIN SERPL-MCNC: 3.9 G/DL (ref 3.5–5.2)
ALBUMIN/GLOB SERPL: 1.6 G/DL
ALP SERPL-CCNC: 64 U/L (ref 39–117)
ALT SERPL W P-5'-P-CCNC: 32 U/L (ref 1–41)
ANION GAP SERPL CALCULATED.3IONS-SCNC: 15 MMOL/L (ref 5–15)
AST SERPL-CCNC: 24 U/L (ref 1–40)
BASOPHILS # BLD AUTO: 0.06 10*3/MM3 (ref 0–0.2)
BASOPHILS NFR BLD AUTO: 0.9 % (ref 0–1.5)
BILIRUB SERPL-MCNC: 0.6 MG/DL (ref 0–1.2)
BUN SERPL-MCNC: 7.8 MG/DL (ref 6–20)
BUN/CREAT SERPL: 9.5 (ref 7–25)
CALCIUM SPEC-SCNC: 8.5 MG/DL (ref 8.6–10.5)
CHLORIDE SERPL-SCNC: 103 MMOL/L (ref 98–107)
CK SERPL-CCNC: 236 U/L (ref 20–200)
CO2 SERPL-SCNC: 21 MMOL/L (ref 22–29)
CREAT SERPL-MCNC: 0.82 MG/DL (ref 0.76–1.27)
D DIMER PPP FEU-MCNC: <0.27 MCGFEU/ML (ref 0–0.51)
DEPRECATED RDW RBC AUTO: 42.6 FL (ref 37–54)
EGFRCR SERPLBLD CKD-EPI 2021: 106.4 ML/MIN/1.73
EOSINOPHIL # BLD AUTO: 0.09 10*3/MM3 (ref 0–0.4)
EOSINOPHIL NFR BLD AUTO: 1.3 % (ref 0.3–6.2)
ERYTHROCYTE [DISTWIDTH] IN BLOOD BY AUTOMATED COUNT: 13.3 % (ref 12.3–15.4)
GEN 5 1HR TROPONIN T REFLEX: 34 NG/L
GLOBULIN UR ELPH-MCNC: 2.5 GM/DL
GLUCOSE BLDC GLUCOMTR-MCNC: 358 MG/DL (ref 70–130)
GLUCOSE SERPL-MCNC: 270 MG/DL (ref 65–99)
HCT VFR BLD AUTO: 43.4 % (ref 37.5–51)
HGB BLD-MCNC: 14.7 G/DL (ref 13–17.7)
IMM GRANULOCYTES # BLD AUTO: 0.01 10*3/MM3 (ref 0–0.05)
IMM GRANULOCYTES NFR BLD AUTO: 0.1 % (ref 0–0.5)
LYMPHOCYTES # BLD AUTO: 2.37 10*3/MM3 (ref 0.7–3.1)
LYMPHOCYTES NFR BLD AUTO: 33.6 % (ref 19.6–45.3)
MAGNESIUM SERPL-MCNC: 1.8 MG/DL (ref 1.6–2.6)
MCH RBC QN AUTO: 29.6 PG (ref 26.6–33)
MCHC RBC AUTO-ENTMCNC: 33.9 G/DL (ref 31.5–35.7)
MCV RBC AUTO: 87.3 FL (ref 79–97)
MONOCYTES # BLD AUTO: 0.46 10*3/MM3 (ref 0.1–0.9)
MONOCYTES NFR BLD AUTO: 6.5 % (ref 5–12)
NEUTROPHILS NFR BLD AUTO: 4.06 10*3/MM3 (ref 1.7–7)
NEUTROPHILS NFR BLD AUTO: 57.6 % (ref 42.7–76)
NRBC BLD AUTO-RTO: 0 /100 WBC (ref 0–0.2)
PLATELET # BLD AUTO: 238 10*3/MM3 (ref 140–450)
PMV BLD AUTO: 11.1 FL (ref 6–12)
POTASSIUM SERPL-SCNC: 3.6 MMOL/L (ref 3.5–5.2)
PROT SERPL-MCNC: 6.4 G/DL (ref 6–8.5)
RBC # BLD AUTO: 4.97 10*6/MM3 (ref 4.14–5.8)
SODIUM SERPL-SCNC: 139 MMOL/L (ref 136–145)
TROPONIN T % DELTA: 3
TROPONIN T NUMERIC DELTA: 1 NG/L
TROPONIN T SERPL HS-MCNC: 33 NG/L
WBC NRBC COR # BLD AUTO: 7.05 10*3/MM3 (ref 3.4–10.8)

## 2025-07-20 PROCEDURE — 25010000002 NITROGLYCERIN 200 MCG/ML SOLUTION: Performed by: FAMILY MEDICINE

## 2025-07-20 PROCEDURE — 82550 ASSAY OF CK (CPK): CPT | Performed by: FAMILY MEDICINE

## 2025-07-20 PROCEDURE — 83735 ASSAY OF MAGNESIUM: CPT | Performed by: FAMILY MEDICINE

## 2025-07-20 PROCEDURE — 63710000001 INSULIN LISPRO (HUMAN) PER 5 UNITS

## 2025-07-20 PROCEDURE — G0378 HOSPITAL OBSERVATION PER HR: HCPCS

## 2025-07-20 PROCEDURE — 85025 COMPLETE CBC W/AUTO DIFF WBC: CPT | Performed by: FAMILY MEDICINE

## 2025-07-20 PROCEDURE — 71045 X-RAY EXAM CHEST 1 VIEW: CPT

## 2025-07-20 PROCEDURE — 80053 COMPREHEN METABOLIC PANEL: CPT | Performed by: FAMILY MEDICINE

## 2025-07-20 PROCEDURE — 96365 THER/PROPH/DIAG IV INF INIT: CPT

## 2025-07-20 PROCEDURE — 84484 ASSAY OF TROPONIN QUANT: CPT | Performed by: FAMILY MEDICINE

## 2025-07-20 PROCEDURE — 85379 FIBRIN DEGRADATION QUANT: CPT

## 2025-07-20 PROCEDURE — 82948 REAGENT STRIP/BLOOD GLUCOSE: CPT

## 2025-07-20 PROCEDURE — 99285 EMERGENCY DEPT VISIT HI MDM: CPT | Performed by: FAMILY MEDICINE

## 2025-07-20 PROCEDURE — 93010 ELECTROCARDIOGRAM REPORT: CPT | Performed by: EMERGENCY MEDICINE

## 2025-07-20 PROCEDURE — 93005 ELECTROCARDIOGRAM TRACING: CPT

## 2025-07-20 PROCEDURE — 93005 ELECTROCARDIOGRAM TRACING: CPT | Performed by: FAMILY MEDICINE

## 2025-07-20 PROCEDURE — 36415 COLL VENOUS BLD VENIPUNCTURE: CPT

## 2025-07-20 PROCEDURE — 96366 THER/PROPH/DIAG IV INF ADDON: CPT

## 2025-07-20 PROCEDURE — 96372 THER/PROPH/DIAG INJ SC/IM: CPT

## 2025-07-20 PROCEDURE — 25010000002 ENOXAPARIN PER 10 MG

## 2025-07-20 RX ORDER — ONDANSETRON 2 MG/ML
4 INJECTION INTRAMUSCULAR; INTRAVENOUS EVERY 6 HOURS PRN
Status: DISCONTINUED | OUTPATIENT
Start: 2025-07-20 | End: 2025-07-21 | Stop reason: HOSPADM

## 2025-07-20 RX ORDER — NICOTINE 21 MG/24HR
1 PATCH, TRANSDERMAL 24 HOURS TRANSDERMAL
Status: DISCONTINUED | OUTPATIENT
Start: 2025-07-20 | End: 2025-07-21 | Stop reason: HOSPADM

## 2025-07-20 RX ORDER — SODIUM CHLORIDE 0.9 % (FLUSH) 0.9 %
10 SYRINGE (ML) INJECTION AS NEEDED
Status: DISCONTINUED | OUTPATIENT
Start: 2025-07-20 | End: 2025-07-21 | Stop reason: HOSPADM

## 2025-07-20 RX ORDER — ONDANSETRON 4 MG/1
4 TABLET, ORALLY DISINTEGRATING ORAL EVERY 6 HOURS PRN
Status: DISCONTINUED | OUTPATIENT
Start: 2025-07-20 | End: 2025-07-21 | Stop reason: HOSPADM

## 2025-07-20 RX ORDER — DEXTROSE MONOHYDRATE 25 G/50ML
25 INJECTION, SOLUTION INTRAVENOUS
Status: DISCONTINUED | OUTPATIENT
Start: 2025-07-20 | End: 2025-07-21 | Stop reason: HOSPADM

## 2025-07-20 RX ORDER — ATORVASTATIN CALCIUM 10 MG/1
20 TABLET, FILM COATED ORAL NIGHTLY
Status: CANCELLED | OUTPATIENT
Start: 2025-07-20

## 2025-07-20 RX ORDER — AMOXICILLIN 250 MG
2 CAPSULE ORAL 2 TIMES DAILY
Status: DISCONTINUED | OUTPATIENT
Start: 2025-07-20 | End: 2025-07-21 | Stop reason: HOSPADM

## 2025-07-20 RX ORDER — ATORVASTATIN CALCIUM 40 MG/1
80 TABLET, FILM COATED ORAL NIGHTLY
Status: DISCONTINUED | OUTPATIENT
Start: 2025-07-20 | End: 2025-07-21 | Stop reason: HOSPADM

## 2025-07-20 RX ORDER — METOPROLOL SUCCINATE 50 MG/1
50 TABLET, EXTENDED RELEASE ORAL DAILY
COMMUNITY
End: 2025-07-28

## 2025-07-20 RX ORDER — IBUPROFEN 600 MG/1
1 TABLET ORAL
Status: DISCONTINUED | OUTPATIENT
Start: 2025-07-20 | End: 2025-07-21 | Stop reason: HOSPADM

## 2025-07-20 RX ORDER — NITROGLYCERIN 20 MG/100ML
10 INJECTION INTRAVENOUS
Status: DISCONTINUED | OUTPATIENT
Start: 2025-07-20 | End: 2025-07-21 | Stop reason: HOSPADM

## 2025-07-20 RX ORDER — INSULIN LISPRO 100 [IU]/ML
2-7 INJECTION, SOLUTION INTRAVENOUS; SUBCUTANEOUS
Status: DISCONTINUED | OUTPATIENT
Start: 2025-07-20 | End: 2025-07-21 | Stop reason: HOSPADM

## 2025-07-20 RX ORDER — BISACODYL 10 MG
10 SUPPOSITORY, RECTAL RECTAL DAILY PRN
Status: DISCONTINUED | OUTPATIENT
Start: 2025-07-20 | End: 2025-07-21 | Stop reason: HOSPADM

## 2025-07-20 RX ORDER — NITROGLYCERIN 0.4 MG/1
0.4 TABLET SUBLINGUAL
Status: DISCONTINUED | OUTPATIENT
Start: 2025-07-20 | End: 2025-07-21 | Stop reason: HOSPADM

## 2025-07-20 RX ORDER — ACETAMINOPHEN 325 MG/1
650 TABLET ORAL EVERY 4 HOURS PRN
Status: DISCONTINUED | OUTPATIENT
Start: 2025-07-20 | End: 2025-07-21 | Stop reason: HOSPADM

## 2025-07-20 RX ORDER — ASPIRIN 81 MG/1
81 TABLET ORAL DAILY
Status: DISCONTINUED | OUTPATIENT
Start: 2025-07-21 | End: 2025-07-21 | Stop reason: HOSPADM

## 2025-07-20 RX ORDER — NICOTINE POLACRILEX 4 MG
15 LOZENGE BUCCAL
Status: DISCONTINUED | OUTPATIENT
Start: 2025-07-20 | End: 2025-07-21 | Stop reason: HOSPADM

## 2025-07-20 RX ORDER — METOPROLOL SUCCINATE 50 MG/1
50 TABLET, EXTENDED RELEASE ORAL DAILY
Status: DISCONTINUED | OUTPATIENT
Start: 2025-07-20 | End: 2025-07-21 | Stop reason: HOSPADM

## 2025-07-20 RX ORDER — SODIUM CHLORIDE 0.9 % (FLUSH) 0.9 %
10 SYRINGE (ML) INJECTION EVERY 12 HOURS SCHEDULED
Status: DISCONTINUED | OUTPATIENT
Start: 2025-07-20 | End: 2025-07-21 | Stop reason: HOSPADM

## 2025-07-20 RX ORDER — ACETAMINOPHEN 650 MG/1
650 SUPPOSITORY RECTAL EVERY 4 HOURS PRN
Status: DISCONTINUED | OUTPATIENT
Start: 2025-07-20 | End: 2025-07-21 | Stop reason: HOSPADM

## 2025-07-20 RX ORDER — METOPROLOL TARTRATE 25 MG/1
12.5 TABLET, FILM COATED ORAL EVERY 12 HOURS SCHEDULED
Status: CANCELLED | OUTPATIENT
Start: 2025-07-20

## 2025-07-20 RX ORDER — SODIUM CHLORIDE 9 MG/ML
40 INJECTION, SOLUTION INTRAVENOUS AS NEEDED
Status: DISCONTINUED | OUTPATIENT
Start: 2025-07-20 | End: 2025-07-21 | Stop reason: HOSPADM

## 2025-07-20 RX ORDER — ACETAMINOPHEN 160 MG/5ML
650 SOLUTION ORAL EVERY 4 HOURS PRN
Status: DISCONTINUED | OUTPATIENT
Start: 2025-07-20 | End: 2025-07-21 | Stop reason: HOSPADM

## 2025-07-20 RX ORDER — MORPHINE SULFATE 2 MG/ML
1 INJECTION, SOLUTION INTRAMUSCULAR; INTRAVENOUS EVERY 4 HOURS PRN
Status: DISCONTINUED | OUTPATIENT
Start: 2025-07-20 | End: 2025-07-21 | Stop reason: HOSPADM

## 2025-07-20 RX ORDER — ENOXAPARIN SODIUM 150 MG/ML
1 INJECTION SUBCUTANEOUS EVERY 12 HOURS
Status: DISCONTINUED | OUTPATIENT
Start: 2025-07-20 | End: 2025-07-21 | Stop reason: HOSPADM

## 2025-07-20 RX ORDER — NALOXONE HCL 0.4 MG/ML
0.4 VIAL (ML) INJECTION
Status: DISCONTINUED | OUTPATIENT
Start: 2025-07-20 | End: 2025-07-21 | Stop reason: HOSPADM

## 2025-07-20 RX ORDER — POLYETHYLENE GLYCOL 3350 17 G/17G
17 POWDER, FOR SOLUTION ORAL DAILY PRN
Status: DISCONTINUED | OUTPATIENT
Start: 2025-07-20 | End: 2025-07-21 | Stop reason: HOSPADM

## 2025-07-20 RX ORDER — BISACODYL 5 MG/1
5 TABLET, DELAYED RELEASE ORAL DAILY PRN
Status: DISCONTINUED | OUTPATIENT
Start: 2025-07-20 | End: 2025-07-21 | Stop reason: HOSPADM

## 2025-07-20 RX ADMIN — METOPROLOL SUCCINATE 50 MG: 50 TABLET, EXTENDED RELEASE ORAL at 18:24

## 2025-07-20 RX ADMIN — NICOTINE 1 PATCH: 21 PATCH, EXTENDED RELEASE TRANSDERMAL at 18:24

## 2025-07-20 RX ADMIN — ENOXAPARIN SODIUM 120 MG: 120 INJECTION SUBCUTANEOUS at 17:28

## 2025-07-20 RX ADMIN — ACETAMINOPHEN 650 MG: 325 TABLET ORAL at 21:45

## 2025-07-20 RX ADMIN — NITROGLYCERIN 10 MCG/MIN: 20 INJECTION INTRAVENOUS at 15:01

## 2025-07-20 RX ADMIN — INSULIN LISPRO 6 UNITS: 100 INJECTION, SOLUTION INTRAVENOUS; SUBCUTANEOUS at 21:29

## 2025-07-20 RX ADMIN — ATORVASTATIN CALCIUM 80 MG: 40 TABLET, FILM COATED ORAL at 21:29

## 2025-07-20 RX ADMIN — Medication 10 ML: at 21:30

## 2025-07-20 NOTE — ED PROVIDER NOTES
HPI:     Patient is a 80-year-old white male with known history of coronary artery disease who [place] the lower who presents to the emergency room as a transfer from [facility] after beginning to have chest pain substernally this morning with shortness of breath and diaphoresis that started at 730.  He states that he was going to try to make it to [place] but he was having so much pain 10 out of 10 and then he decided to go to [place].  According to the physician who saw him there Dr. Dominguez his initial troponin was 0.03 and his second 1 was 0.06 doubled.  He was given 1 sublingual nitroglycerin, subcutaneous Lovenox 1 mg/kg, and 324 mg of aspirin.  He continued to have pain and was transferred here.  However they transfer discussion stated that the patient is pain-free at the time of transfer however the patient himself stated that he was still than the pain 7 out of 10.  When asked why did he want to come to [facility] [place], operated by [facility] for his chest pain versus [facility] where his cardiologist that the patient stated that he was hoping to start having cardiovascular care here versus [facility] ([facility])..        REVIEW OF SYSTEMS    CONSTITUTIONAL:  No complaints of fever, chills,or weakness  EYES:  No complaints of discharge   ENT: No complaints of sore throat or ear pain  CARDIOVASCULAR: Positive for atypical chest pain RESPIRATORY: Positive for shortness of breath   GI:  No complaints of abdominal pain, nausea, vomiting, or diarrhea  MUSCULOSKELETAL:  No complaints of back pain  SKIN:  No complaints of rash  NEUROLOGIC:  No complaints of headache, focal weakness, or sensory changes  ENDOCRINE:  No complaints of polyuria or polydipsia  LYMPHATIC:  No complaints of swollen glands  GENITOURINARY: No complaints of urinary frequency or hematuria        PAST MEDICAL HISTORY  Past Medical History:   Diagnosis Date    Cellulitis and abscess of left leg     Class 1 obesity     Diabetes mellitus     DVT (deep venous  thrombosis)     Hyperlipidemia     Hypertension     MRSA infection     Tobacco abuse        FAMILY HISTORY  Family History   Problem Relation Age of Onset    Hypertension Mother     Heart attack Father     Heart attack Maternal Uncle     Heart attack Paternal Uncle        SOCIAL HISTORY  Social History     Socioeconomic History    Marital status:      Spouse name: Mrs. Patrick Mar    Number of children: 3   Tobacco Use    Smoking status: Every Day     Current packs/day: 1.00     Types: Cigarettes     Passive exposure: Past    Smokeless tobacco: Never   Vaping Use    Vaping status: Never Used   Substance and Sexual Activity    Alcohol use: Yes     Comment: rare special occasions    Drug use: Yes     Types: Marijuana     Comment: every now and then    Sexual activity: Defer     Partners: Female       IMMUNIZATION HISTORY  Deferred to primary care physician.    SURGICAL HISTORY  Past Surgical History:   Procedure Laterality Date    APPENDECTOMY      [facility]ON[facility] ST[facility]AC[facility]  2023    [facility]    INCISION AND DRAINAGE OF WOUND Right 4/16/2025    Procedure: Right index finger incision and drainage;  Surgeon: Luis Miguel Sahni MD;  Location:  [facility] OR;  Service: Orthopedics;  Laterality: Right;    LAPAROSCOPIC CHOLECYSTECTOMY      VARICOSE VEIN SURGERY Left 4/19/2024    Procedure: LEFT LOWER EXTREMITY RADIOFREQUENCY ABLATION;  Surgeon: Theodore Ch DO;  Location:  [facility] HYBRID OR;  Service: Vascular;  Laterality: Left;       CURRENT MEDICATIONS    Current Facility-Administered Medications:     nitroglycerin (TRIDIL) 200 mcg/ml infusion, 10 mcg/min, Intravenous, Titrated, Abram Anthony Jr., MD, Last Rate: 4.5 mL/hr at 07/20/25 1603, 15 mcg/min at 07/20/25 1603    Current Outpatient Medications:     aspirin 81 MG EC tablet, Take 1 tablet by mouth Daily., Disp: , Rfl:     insulin glargine (LANTUS, SEMGLEE) 100 UNIT/ML injection, Inject 15 Units under the skin into the appropriate area as directed Every 12  "(Twelve) Hours., Disp: 21 mL, Rfl: 12    ALLERGIES  No Known Allergies        Cardiac exam    VITAL SIGNS:  /83   Pulse 85   Temp 98 °F (36.7 °C) (Oral)   Resp 17   Ht 188 cm (74\")   Wt 120 kg (265 lb)   SpO2 95%   BMI 34.02 kg/m²     Constitutional: Patient is alert and in no distress.  Patient with moderate substernal chest discomfort.    ENT: There is a normal pharynx with no acute erythema or exudate and oral mucosa is moist.  Nose is clear with no drainage.  Tympanic membranes intact and nonerythemic    Respiratory: Patient is clear to auscultation bilaterally with no wheezing or rhonchi.  Chest wall is nontender.  There are no external lesions on the chest.  There is no crepitance    Cardiovascular: S1-S2 regular rate and rhythm no murmurs rubs gallops    Abdomen: Soft, nontender, and  bowel sounds are normal in all 4 quadrants.  There is no rebound or guarding noted.  There is no abdominal distention or hepatosplenomegaly.    Genitourinary: Patient is voiding appropriately.    Integument: No acute lesions noted and color appears to be normal.    [place] Coma Scale: Total score 15    Neurological: Patient is alert and oriented x4 and no acute findings noted.  Speech is fluent and cognition is normal.  No evidence of acute CVA.  Cranial nerves II through XII intact.  Patient with normal motor function as well as reflexes and sensation        RADIOLOGY/PROCEDURES      No orders to display          FUTURE APPOINTMENTS     No future appointments.       EKG (reviewed and interpreted by me): Normal sinus rhythm. No acute ischemia. Heart rate 81 with no acute ST segment elevation or depression noted.          HEART SCORE     Patient history  1      Moderately suspicious (1 point)    2   ECG       Nonspecific repolarization disturbance (1 point)    3   Patient age       Between 45 and 65 (1 point)    4   Risk factors (Hypercholesterolemia, Hypertension, diabetes, smoking, obesity)     More than 3 risk " factors or atherosclerosis history (2 points)    5   Troponin       1 to 3 times higher than normal (1 point)      6     TOTAL RISK NUMBER: 6    The three risk categories are described below:  Heart score MACE risk Recommendation  0 - 3 Low (1.7%) Discharge can be an option.  4 - 6 Intermediate (20.3%) Clinical observation and further investigations.  7 - 10 High (72.2%) Immediate invasive treatment        COURSE & MEDICAL DECISION MAKING       Patient's partial differential diagnosis can include:    Unstable angina, angina, non-STEMI, arrhythmia, pneumothorax, pulmonary embolism, electrolyte abnormality,  Prinzmetal angina, costochondritis pleurisy, pleural effusion, pulmonary edema, panic attack, esophageal spasm, GERD, gastritis, chest spasms, and others    CBC, CMP, magnesium, troponin, EKG      Troponin is 33.  Patient placed on a nitroglycerin drip which has improved his pain.  Patient will be admitted to the hospitalist.  Discussed this with Bridget nurse practitioner covering for the hospitalist group who states the patient to be admitted to Dr Johnson.    Patient could benefit from stress testing tomorrow and echocardiogram.    Patient's level of risk: Moderate/intermediate        CRITICAL CARE    CRITICAL CARE: No    CRITICAL CARE TIME: None      Recent Results (from the past 24 hours)   ECG 12 Lead Chest Pain    Collection Time: 07/20/25  2:45 PM   Result Value Ref Range    QT Interval 404 ms    QTC Interval 469 ms   Comprehensive Metabolic Panel    Collection Time: 07/20/25  3:01 PM    Specimen: Blood   Result Value Ref Range    Glucose 270 (H) 65 - 99 mg/dL    BUN 7.8 6.0 - 20.0 mg/dL    Creatinine 0.82 0.76 - 1.27 mg/dL    Sodium 139 136 - 145 mmol/L    Potassium 3.6 3.5 - 5.2 mmol/L    Chloride 103 98 - 107 mmol/L    CO2 21.0 (L) 22.0 - 29.0 mmol/L    Calcium 8.5 (L) 8.6 - 10.5 mg/dL    Total Protein 6.4 6.0 - 8.5 g/dL    Albumin 3.9 3.5 - 5.2 g/dL    ALT (SGPT) 32 1 - 41 U/L    AST (SGOT) 24 1 - 40  U/L    Alkaline Phosphatase 64 39 - 117 U/L    Total Bilirubin 0.6 0.0 - 1.2 mg/dL    Globulin 2.5 gm/dL    A/G Ratio 1.6 g/dL    BUN/Creatinine Ratio 9.5 7.0 - 25.0    Anion Gap 15.0 5.0 - 15.0 mmol/L    eGFR 106.4 >60.0 mL/min/1.73   High Sensitivity Troponin T    Collection Time: 07/20/25  3:01 PM    Specimen: Blood   Result Value Ref Range    HS Troponin T 33 (H) <22 ng/L   CK    Collection Time: 07/20/25  3:01 PM    Specimen: Blood   Result Value Ref Range    Creatine Kinase 236 (H) 20 - 200 U/L   Magnesium    Collection Time: 07/20/25  3:01 PM    Specimen: Blood   Result Value Ref Range    Magnesium 1.8 1.6 - 2.6 mg/dL   CBC Auto Differential    Collection Time: 07/20/25  3:01 PM    Specimen: Blood   Result Value Ref Range    WBC 7.05 3.40 - 10.80 10*3/mm3    RBC 4.97 4.14 - 5.80 10*6/mm3    Hemoglobin 14.7 13.0 - 17.7 g/dL    Hematocrit 43.4 37.5 - 51.0 %    MCV 87.3 79.0 - 97.0 fL    MCH 29.6 26.6 - 33.0 pg    MCHC 33.9 31.5 - 35.7 g/dL    RDW 13.3 12.3 - 15.4 %    RDW-SD 42.6 37.0 - 54.0 fl    MPV 11.1 6.0 - 12.0 fL    Platelets 238 140 - 450 10*3/mm3    Neutrophil % 57.6 42.7 - 76.0 %    Lymphocyte % 33.6 19.6 - 45.3 %    Monocyte % 6.5 5.0 - 12.0 %    Eosinophil % 1.3 0.3 - 6.2 %    Basophil % 0.9 0.0 - 1.5 %    Immature Grans % 0.1 0.0 - 0.5 %    Neutrophils, Absolute 4.06 1.70 - 7.00 10*3/mm3    Lymphocytes, Absolute 2.37 0.70 - 3.10 10*3/mm3    Monocytes, Absolute 0.46 0.10 - 0.90 10*3/mm3    Eosinophils, Absolute 0.09 0.00 - 0.40 10*3/mm3    Basophils, Absolute 0.06 0.00 - 0.20 10*3/mm3    Immature Grans, Absolute 0.01 0.00 - 0.05 10*3/mm3    nRBC 0.0 0.0 - 0.2 /100 WBC            Old charts were reviewed per [facility] EMR.  Pertinent details are summarized above.  All laboratory, radiologic, and EKG studies that were performed in the Emergency Department were a necessary part of the evaluation needed to exclude unstable or  emergent medical conditions.     Patient was hemodynamically and neurologically  stable in the ED.   Pertinent studies were reviewed as above.     The patient received:  Medications   nitroglycerin (TRIDIL) 200 mcg/ml infusion (15 mcg/min Intravenous Rate/Dose Change 7/20/25 1935)            ED Disposition       ED Disposition   Decision to Admit    Condition   --    Comment   Level of Care: Telemetry [5]   Diagnosis: Unstable angina [[id number]]   Admitting Physician: JORDON REIS [[id number]]   Attending Physician: JORDON REIS [[id number]]                   Dragon disclaimer:  Part of this note may be an electronic transcription/translation of spoken language to printed text using the Dragon Dictation System.    I have reviewed the patient’s prescription history via a prescription monitoring program.  This information is consistent with my knowledge of the patient’s controlled substance use history.    Patient evaluated during Coronavirus Pandemic. Isolation practices followed according to [facility] policy.     FINAL IMPRESSION   Diagnosis Plan   1. Shortness of breath        2. Unstable angina              MD Raj Balbuena Jr, Thomas Mark Jr., MD  07/20/25 1450

## 2025-07-20 NOTE — H&P
[facility] Medicine Services  HISTORY AND PHYSICAL    Date of Admission: 7/20/2025  Primary Care Physician: Charlee Serra APRN    Subjective   Primary Historian: Patient    Chief Complaint: Chest pain    Chest Pain   Associated symptoms include diaphoresis and shortness of breath.     James Mar is a 50-year-old male with a past medical history of cellulitis and abscess of the left leg and recent infected finger followed by Dr. Kennedy for an additional MRSA infection, DVT, hyperlipidemia, hypertension, tobacco abuse, CAD NSTEMI x 2 and initial stent placement several years prior to most recent  cardiac catheterization due to NSTEMI 12/22/2023 with a successful intervention of proximal LAD stent thrombosis and balloon angioplasty on Effient at that time and followed by Dr. King at [facility].   Patient additionally continues to smoke 1 PPD.  Patient presented 7/20/2025 to [facility] with complaints of substernal chest pain that began this morning at approximately 0730.  He stated he was driving to [place] but the pain got too severe and he went to [place] instead.  According to telephone report per Dr. Salazar his initial troponin was 0.03 and his second at 0.06.  He was given 1 sublingual nitroglycerin, subcutaneous Lovenox 1 mg/kg and 324 of aspirin.  He continued to have significant pain and transfer was initiated.  Patient was told and report that he was pain-free however when he arrived patient was still having a pain of 7 out of 10.  Patient was placed on nitroglycerin gtt, EKG reviewed with no concerns for STEMI, CK2 36, initial troponin of 33, subsequent of 34 with 3% delta.  Patient's pain is currently 5/10 on nitro gtt. will continue to titrate up and initiate morphine as needed.  If troponins continue to rise and chest pain remains unresolved will consult cardiology otherwise patient will be n.p.o. after midnight for PET myocardial  scan.  Patient was made aware of plan of care, all questions were answered the best my ability and he is in agreement for observation and treatment.    ED workup CK2 36, troponin 33, subsequent 34 with a 3% delta, CO2 21.0, , Ca 8.5, CBC unremarkable.  Chest x-ray pending.  D-dimer pending    Echocardiogram per [facility] 12/24/2023        Review of Systems   Constitutional:  Positive for diaphoresis.   Respiratory:  Positive for shortness of breath.    Cardiovascular:  Positive for chest pain.      Otherwise complete ROS reviewed and negative except as mentioned in the HPI.    Past Medical History:   Past Medical History:   Diagnosis Date    Cellulitis and abscess of left leg     Class 1 obesity     Diabetes mellitus     DVT (deep venous thrombosis)     Hyperlipidemia     Hypertension     MRSA infection     Tobacco abuse      Past Surgical History:  Past Surgical History:   Procedure Laterality Date    APPENDECTOMY      CORONARY STENT PLACEMENT  2023    [facility]    INCISION AND DRAINAGE OF WOUND Right 4/16/2025    Procedure: Right index finger incision and drainage;  Surgeon: Luis Miguel Sahni MD;  Location:  [facility] OR;  Service: Orthopedics;  Laterality: Right;    LAPAROSCOPIC CHOLECYSTECTOMY      VARICOSE VEIN SURGERY Left 4/19/2024    Procedure: LEFT LOWER EXTREMITY RADIOFREQUENCY ABLATION;  Surgeon: Theodore Ch DO;  Location:  [facility] HYBRID OR;  Service: Vascular;  Laterality: Left;     Social History:  reports that he has been smoking cigarettes. He has been exposed to tobacco smoke. He has never used smokeless tobacco. He reports current alcohol use. He reports current drug use. Drug: Marijuana.    Family History: family history includes Heart attack in his father, maternal uncle, and paternal uncle; Hypertension in his mother.       Allergies:  No Known Allergies    Medications:  Prior to Admission medications    Medication Sig Start Date End Date Taking? Authorizing Provider   aspirin 81 MG EC  "tablet Take 1 tablet by mouth Daily.   Yes Triston Christian MD   insulin glargine (LANTUS, SEMGLEE) 100 UNIT/ML injection Inject 15 Units under the skin into the appropriate area as directed Every 12 (Twelve) Hours. 4/19/25  Yes Ashanti Whatley MD   atorvastatin (LIPITOR) 80 MG tablet Take 1 tablet by mouth Daily.  Patient not taking: Reported on 7/20/2025 7/20/25  Triston Christian MD   Insulin Glargine (LANTUS SOLOSTAR) 100 UNIT/ML injection pen Inject 15 Units under the skin into the appropriate area as directed Every 12 (Twelve) Hours for 30 days. 4/30/25 7/20/25  Jimmy Kennedy MD   losartan (COZAAR) 50 MG tablet Take 1 tablet by mouth Daily.  Patient not taking: Reported on 7/20/2025 7/20/25  Triston Christian MD   metoprolol succinate XL (TOPROL-XL) 50 MG 24 hr tablet Take 1 tablet by mouth Daily.  Patient not taking: Reported on 7/20/2025 7/20/25  Triston Christian MD   oxyCODONE-acetaminophen (PERCOCET) 5-325 MG per tablet Take 2 tablets by mouth Every 8 (Eight) Hours As Needed for Moderate Pain.  Patient not taking: Reported on 7/20/2025 4/19/25 7/20/25  Ashanti Whatley MD   prasugrel (EFFIENT) 10 MG tablet Take 1 tablet by mouth Daily.  Patient not taking: Reported on 7/20/2025 7/20/25  Triston Christian MD   Semaglutide, 1 MG/DOSE, (Ozempic, 1 MG/DOSE,) 4 MG/3ML solution pen-injector Inject 1 mg under the skin into the appropriate area as directed 1 (One) Time Per Week.  Patient not taking: Reported on 7/20/2025 7/20/25  Triston Christian MD     I have utilized all available immediate resources to obtain, update, or review the patient's current medications (including all prescriptions, over-the-counter products, herbals, cannabis/cannabidiol products, and vitamin/mineral/dietary (nutritional) supplements).         Objective     Vital Signs: /93   Pulse 83   Temp 98 °F (36.7 °C) (Oral)   Resp 17   Ht 188 cm (74\")   Wt 120 kg (265 lb)   SpO2 93%   BMI 34.02 kg/m² "   Physical Exam  Vitals and nursing note reviewed.   Constitutional:       General: He is not in acute distress.     Appearance: Normal appearance. He is obese. He is not ill-appearing or toxic-appearing.   HENT:      Head: Normocephalic and atraumatic.      Right Ear: Tympanic membrane normal.      Left Ear: Tympanic membrane normal.      Nose: Nose normal.      Mouth/Throat:      Mouth: Mucous membranes are moist.      Pharynx: Oropharynx is clear.   Eyes:      Pupils: Pupils are equal, round, and reactive to light.   Cardiovascular:      Rate and Rhythm: Normal rate and regular rhythm.      Pulses: Normal pulses.   Pulmonary:      Effort: Pulmonary effort is normal. No respiratory distress.      Breath sounds: Normal breath sounds. No wheezing or rales.   Abdominal:      General: Abdomen is protuberant. Bowel sounds are normal. There is no distension.      Palpations: Abdomen is soft.      Tenderness: There is no abdominal tenderness.   Musculoskeletal:         General: Normal range of motion.      Cervical back: Normal range of motion and neck supple. No rigidity or tenderness.   Skin:     General: Skin is warm.      Capillary Refill: Capillary refill takes less than 2 seconds.   Neurological:      General: No focal deficit present.      Mental Status: He is alert and oriented to person, place, and time.   Psychiatric:         Mood and Affect: Mood normal.         Behavior: Behavior normal.         Thought Content: Thought content normal.         Judgment: Judgment normal.        Results Reviewed:  Lab Results (last 24 hours)       Procedure Component Value Units Date/Time    High Sensitivity Troponin T 1Hr [[id number]]  (Abnormal) Collected: 07/20/25 1603    Specimen: Blood Updated: 07/20/25 1631     HS Troponin T 34 ng/L      Troponin T Numeric Delta 1 ng/L      Troponin T % Delta 3            Comprehensive Metabolic Panel [[id number]]  (Abnormal) Collected: 07/20/25 1501    Specimen: Blood Updated: 07/20/25  1530     Glucose 270 mg/dL      BUN 7.8 mg/dL      Creatinine 0.82 mg/dL      Sodium 139 mmol/L      Potassium 3.6 mmol/L      Chloride 103 mmol/L      CO2 21.0 mmol/L      Calcium 8.5 mg/dL      Total Protein 6.4 g/dL      Albumin 3.9 g/dL      ALT (SGPT) 32 U/L      AST (SGOT) 24 U/L      Alkaline Phosphatase 64 U/L      Total Bilirubin 0.6 mg/dL      Globulin 2.5 gm/dL      A/G Ratio 1.6 g/dL      BUN/Creatinine Ratio 9.5     Anion Gap 15.0 mmol/L      eGFR 106.4 mL/min/1.73             Magnesium [[id number]]  (Normal) Collected: 07/20/25 1501    Specimen: Blood Updated: 07/20/25 1530     Magnesium 1.8 mg/dL     CK [[id number]]  (Abnormal) Collected: 07/20/25 1501    Specimen: Blood Updated: 07/20/25 1529     Creatine Kinase 236 U/L     High Sensitivity Troponin T [[id number]]  (Abnormal) Collected: 07/20/25 1501    Specimen: Blood Updated: 07/20/25 1527     HS Troponin T 33 ng/L             CBC & Differential [[id number]]  (Normal) Collected: 07/20/25 1501    Specimen: Blood Updated: 07/20/25 1511            CBC Auto Differential [[id number]]  (Normal) Collected: 07/20/25 1501    Specimen: Blood Updated: 07/20/25 1511     WBC 7.05 10*3/mm3      RBC 4.97 10*6/mm3      Hemoglobin 14.7 g/dL      Hematocrit 43.4 %      MCV 87.3 fL      MCH 29.6 pg      MCHC 33.9 g/dL      RDW 13.3 %      RDW-SD 42.6 fl      MPV 11.1 fL      Platelets 238 10*3/mm3      Neutrophil % 57.6 %      Lymphocyte % 33.6 %      Monocyte % 6.5 %      Eosinophil % 1.3 %      Basophil % 0.9 %      Immature Grans % 0.1 %      Neutrophils, Absolute 4.06 10*3/mm3      Lymphocytes, Absolute 2.37 10*3/mm3      Monocytes, Absolute 0.46 10*3/mm3      Eosinophils, Absolute 0.09 10*3/mm3      Basophils, Absolute 0.06 10*3/mm3      Immature Grans, Absolute 0.01 10*3/mm3      nRBC 0.0 /100 WBC           Imaging Results (Last 24 Hours)       Procedure Component Value Units Date/Time    XR Chest 1 View - In process [[id number]] Resulted: 07/20/25 0344     Updated:  07/20/25 1741    This result has not been signed. Information might be incomplete.              Assessment / Plan   Assessment:   Active Hospital Problems    Diagnosis     **Unstable angina     Tobacco abuse     Type 2 diabetes mellitus without complication, without long-term current use of insulin     Essential hypertension        Treatment Plan  The patient will be admitted to Dr. Johnson's service here at [facility].     Unstable angina  - EKG now and in 6 hours.  -Continue nitro gtt. and titrate as needed for chest pain, morphine 1 mg every 4 for severe pain unresolved by nitro.  - No caffeine, n.p.o. after midnight for PET myocardial scan.  - As needed troponin, nitro, EKG for any new or worsening chest pain  - Continue home ASA,, metoprolol and Lipitor.  Conflicting reports on if patient was compliant with these medications  - continue full dose Lovenox  - A1c and lipid panel pending  - If unresolved or increasing troponin will consult cardiology, otherwise will await results of PET scan    Tobacco abuse  - Smoking cessation initiated.  - NicoDerm CQ 21 mg    Type 2 diabetes mellitus  - A1c pending.  - Lantus on hold due to n.p.o. status, initiate Accu-Cheks AC and at bedtime with low-dose SSI.    Essential hypertension  - Initiate every 4 vital signs  - Initiate metoprolol 12.5 mg every 12 possible need for ACE after review of medications    Start medications as appropriate.    VTE prophylaxis with full dose Lovenox  Labs in a.m.    Medical Decision Making  1 acute, high complexity, unchanged  3 chronic, moderate complexity, unchanged  Number and Complexity of problems: 4  Differential Diagnosis: None    Conditions and Status        Condition is unchanged.     [facility] Data  External documents reviewed: [employer] EHR  Cardiac tracing (EKG, telemetry) interpretation: 7/20/2025 per cardiology reviewed  Radiology interpretation: Awaiting stat chest x-ray and possible CTA if elevated D-dimer  Labs reviewed:  7/20/2025 reviewed  Any tests that were considered but not ordered: None     Decision rules/scores evaluated (example [id number], [place], etc): RAVEN score of 4     Discussed with: Dr. Johnson and patient     Care Planning  Shared decision making: Dr. Johnson and patient  Code status and discussions: Full code per patient    Disposition  Social Determinants of Health that impact treatment or disposition: May possibly need significant education on medication compliance  Estimated length of stay is 1-2 days.     I confirmed that the patient's advanced care plan is present, code status is documented, and a surrogate decision maker is listed in the patient's medical record.     The patient's surrogate decision maker is his wife Devi.     The patient was seen and examined by me on 7/20/2025 at 1722.    Electronically signed by DAVID Bone, 07/20/25, 17:46 CDT.

## 2025-07-21 ENCOUNTER — READMISSION MANAGEMENT (OUTPATIENT)
Dept: CALL CENTER | Facility: HOSPITAL | Age: 50
End: 2025-07-21
Payer: COMMERCIAL

## 2025-07-21 ENCOUNTER — APPOINTMENT (OUTPATIENT)
Facility: HOSPITAL | Age: 50
End: 2025-07-21
Payer: COMMERCIAL

## 2025-07-21 VITALS
SYSTOLIC BLOOD PRESSURE: 145 MMHG | WEIGHT: 268.2 LBS | RESPIRATION RATE: 16 BRPM | HEART RATE: 77 BPM | HEIGHT: 74 IN | DIASTOLIC BLOOD PRESSURE: 79 MMHG | TEMPERATURE: 98 F | BODY MASS INDEX: 34.42 KG/M2 | OXYGEN SATURATION: 100 %

## 2025-07-21 PROBLEM — I20.0 UNSTABLE ANGINA: Status: RESOLVED | Noted: 2025-07-20 | Resolved: 2025-07-21

## 2025-07-21 PROBLEM — R07.9 CHEST PAIN: Status: ACTIVE | Noted: 2025-07-21

## 2025-07-21 LAB
ANION GAP SERPL CALCULATED.3IONS-SCNC: 11 MMOL/L (ref 5–15)
BASOPHILS # BLD MANUAL: 0.12 10*3/MM3 (ref 0–0.2)
BASOPHILS NFR BLD MANUAL: 2 % (ref 0–1.5)
BH CV NUCLEAR PRIOR STUDY: 3
BH CV REST NUCLEAR ISOTOPE DOSE: 25.1 MCI
BH CV STRESS BP STAGE 1: NORMAL
BH CV STRESS COMMENTS STAGE 1: NORMAL
BH CV STRESS DOSE REGADENOSON STAGE 1: 0.4
BH CV STRESS DURATION MIN STAGE 1: 0
BH CV STRESS DURATION SEC STAGE 1: 10
BH CV STRESS HR STAGE 1: 79
BH CV STRESS NUCLEAR ISOTOPE DOSE: 25 MCI
BH CV STRESS PROTOCOL 1: NORMAL
BH CV STRESS RECOVERY BP: NORMAL MMHG
BH CV STRESS RECOVERY HR: 74 BPM
BH CV STRESS STAGE 1: 1
BUN SERPL-MCNC: 13 MG/DL (ref 6–20)
BUN/CREAT SERPL: 17.3 (ref 7–25)
CALCIUM SPEC-SCNC: 8.5 MG/DL (ref 8.6–10.5)
CHLORIDE SERPL-SCNC: 104 MMOL/L (ref 98–107)
CHOLEST SERPL-MCNC: 203 MG/DL (ref 0–200)
CO2 SERPL-SCNC: 23 MMOL/L (ref 22–29)
CREAT SERPL-MCNC: 0.75 MG/DL (ref 0.76–1.27)
DEPRECATED RDW RBC AUTO: 43.8 FL (ref 37–54)
EGFRCR SERPLBLD CKD-EPI 2021: 109.3 ML/MIN/1.73
EOSINOPHIL # BLD MANUAL: 0.06 10*3/MM3 (ref 0–0.4)
EOSINOPHIL NFR BLD MANUAL: 1 % (ref 0.3–6.2)
ERYTHROCYTE [DISTWIDTH] IN BLOOD BY AUTOMATED COUNT: 13.3 % (ref 12.3–15.4)
GLUCOSE BLDC GLUCOMTR-MCNC: 252 MG/DL (ref 70–130)
GLUCOSE BLDC GLUCOMTR-MCNC: 349 MG/DL (ref 70–130)
GLUCOSE SERPL-MCNC: 296 MG/DL (ref 65–99)
HBA1C MFR BLD: 11.9 % (ref 4.8–5.6)
HCT VFR BLD AUTO: 42.3 % (ref 37.5–51)
HDLC SERPL-MCNC: 34 MG/DL (ref 40–60)
HGB BLD-MCNC: 14.2 G/DL (ref 13–17.7)
LDLC SERPL CALC-MCNC: 63 MG/DL (ref 0–100)
LDLC/HDLC SERPL: 0.88 {RATIO}
LYMPHOCYTES # BLD MANUAL: 2.67 10*3/MM3 (ref 0.7–3.1)
LYMPHOCYTES NFR BLD MANUAL: 7 % (ref 5–12)
MAGNESIUM SERPL-MCNC: 1.9 MG/DL (ref 1.6–2.6)
MAXIMAL PREDICTED HEART RATE: 169 BPM
MCH RBC QN AUTO: 30 PG (ref 26.6–33)
MCHC RBC AUTO-ENTMCNC: 33.6 G/DL (ref 31.5–35.7)
MCV RBC AUTO: 89.4 FL (ref 79–97)
MONOCYTES # BLD: 0.44 10*3/MM3 (ref 0.1–0.9)
NEUTROPHILS # BLD AUTO: 2.92 10*3/MM3 (ref 1.7–7)
NEUTROPHILS NFR BLD MANUAL: 47 % (ref 42.7–76)
PERCENT MAX PREDICTED HR: 46.75 %
PLATELET # BLD AUTO: 203 10*3/MM3 (ref 140–450)
PMV BLD AUTO: 11.3 FL (ref 6–12)
POIKILOCYTOSIS BLD QL SMEAR: ABNORMAL
POTASSIUM SERPL-SCNC: 3.7 MMOL/L (ref 3.5–5.2)
QT INTERVAL: 388 MS
QT INTERVAL: 400 MS
QT INTERVAL: 404 MS
QTC INTERVAL: 415 MS
QTC INTERVAL: 450 MS
QTC INTERVAL: 469 MS
RBC # BLD AUTO: 4.73 10*6/MM3 (ref 4.14–5.8)
SMALL PLATELETS BLD QL SMEAR: ADEQUATE
SODIUM SERPL-SCNC: 138 MMOL/L (ref 136–145)
STRESS BASELINE BP: NORMAL MMHG
STRESS BASELINE HR: 69 BPM
STRESS PERCENT HR: 55 %
STRESS POST EXERCISE DUR MIN: 0 MIN
STRESS POST EXERCISE DUR SEC: 10 SEC
STRESS POST PEAK BP: NORMAL MMHG
STRESS POST PEAK HR: 79 BPM
STRESS TARGET HR: 144 BPM
TRIGL SERPL-MCNC: 696 MG/DL (ref 0–150)
TROPONIN T SERPL HS-MCNC: 37 NG/L
TSH SERPL DL<=0.05 MIU/L-ACNC: 1.14 UIU/ML (ref 0.27–4.2)
VARIANT LYMPHS NFR BLD MANUAL: 43 % (ref 19.6–45.3)
VLDLC SERPL-MCNC: 106 MG/DL (ref 5–40)
WBC MORPH BLD: NORMAL
WBC NRBC COR # BLD AUTO: 6.22 10*3/MM3 (ref 3.4–10.8)

## 2025-07-21 PROCEDURE — 78431 MYOCRD IMG PET RST&STRS CT: CPT

## 2025-07-21 PROCEDURE — 80048 BASIC METABOLIC PNL TOTAL CA: CPT

## 2025-07-21 PROCEDURE — 78431 MYOCRD IMG PET RST&STRS CT: CPT | Performed by: EMERGENCY MEDICINE

## 2025-07-21 PROCEDURE — G0378 HOSPITAL OBSERVATION PER HR: HCPCS

## 2025-07-21 PROCEDURE — 84443 ASSAY THYROID STIM HORMONE: CPT

## 2025-07-21 PROCEDURE — 82948 REAGENT STRIP/BLOOD GLUCOSE: CPT

## 2025-07-21 PROCEDURE — 93018 CV STRESS TEST I&R ONLY: CPT | Performed by: EMERGENCY MEDICINE

## 2025-07-21 PROCEDURE — 96372 THER/PROPH/DIAG INJ SC/IM: CPT

## 2025-07-21 PROCEDURE — 34310000005 RUBIDIUM CHLORIDE: Performed by: INTERNAL MEDICINE

## 2025-07-21 PROCEDURE — 93010 ELECTROCARDIOGRAM REPORT: CPT | Performed by: EMERGENCY MEDICINE

## 2025-07-21 PROCEDURE — 25010000002 REGADENOSON 0.4 MG/5ML SOLUTION: Performed by: EMERGENCY MEDICINE

## 2025-07-21 PROCEDURE — 96366 THER/PROPH/DIAG IV INF ADDON: CPT

## 2025-07-21 PROCEDURE — 63710000001 INSULIN LISPRO (HUMAN) PER 5 UNITS

## 2025-07-21 PROCEDURE — 85007 BL SMEAR W/DIFF WBC COUNT: CPT

## 2025-07-21 PROCEDURE — A9555 RB82 RUBIDIUM: HCPCS | Performed by: INTERNAL MEDICINE

## 2025-07-21 PROCEDURE — 80061 LIPID PANEL: CPT

## 2025-07-21 PROCEDURE — 83036 HEMOGLOBIN GLYCOSYLATED A1C: CPT

## 2025-07-21 PROCEDURE — 85025 COMPLETE CBC W/AUTO DIFF WBC: CPT

## 2025-07-21 PROCEDURE — 93016 CV STRESS TEST SUPVJ ONLY: CPT | Performed by: EMERGENCY MEDICINE

## 2025-07-21 PROCEDURE — 83735 ASSAY OF MAGNESIUM: CPT

## 2025-07-21 PROCEDURE — 93017 CV STRESS TEST TRACING ONLY: CPT

## 2025-07-21 PROCEDURE — 93005 ELECTROCARDIOGRAM TRACING: CPT

## 2025-07-21 PROCEDURE — 84484 ASSAY OF TROPONIN QUANT: CPT

## 2025-07-21 PROCEDURE — 63710000001 INSULIN GLARGINE PER 5 UNITS

## 2025-07-21 PROCEDURE — 25010000002 ENOXAPARIN PER 10 MG

## 2025-07-21 RX ORDER — ATORVASTATIN CALCIUM 80 MG/1
80 TABLET, FILM COATED ORAL NIGHTLY
Qty: 90 TABLET | Refills: 3 | Status: SHIPPED | OUTPATIENT
Start: 2025-07-21

## 2025-07-21 RX ORDER — REGADENOSON 0.08 MG/ML
0.4 INJECTION, SOLUTION INTRAVENOUS ONCE
Status: COMPLETED | OUTPATIENT
Start: 2025-07-21 | End: 2025-07-21

## 2025-07-21 RX ADMIN — INSULIN LISPRO 4 UNITS: 100 INJECTION, SOLUTION INTRAVENOUS; SUBCUTANEOUS at 10:34

## 2025-07-21 RX ADMIN — ASPIRIN 81 MG: 81 TABLET, COATED ORAL at 10:35

## 2025-07-21 RX ADMIN — ACETAMINOPHEN 650 MG: 325 TABLET ORAL at 05:16

## 2025-07-21 RX ADMIN — METOPROLOL SUCCINATE 50 MG: 50 TABLET, EXTENDED RELEASE ORAL at 10:35

## 2025-07-21 RX ADMIN — ACETAMINOPHEN 650 MG: 325 TABLET ORAL at 10:45

## 2025-07-21 RX ADMIN — INSULIN LISPRO 5 UNITS: 100 INJECTION, SOLUTION INTRAVENOUS; SUBCUTANEOUS at 12:23

## 2025-07-21 RX ADMIN — SENNOSIDES, DOCUSATE SODIUM 2 TABLET: 50; 8.6 TABLET, FILM COATED ORAL at 10:35

## 2025-07-21 RX ADMIN — ENOXAPARIN SODIUM 120 MG: 120 INJECTION SUBCUTANEOUS at 05:09

## 2025-07-21 RX ADMIN — NICOTINE 1 PATCH: 21 PATCH, EXTENDED RELEASE TRANSDERMAL at 10:37

## 2025-07-21 RX ADMIN — REGADENOSON 0.4 MG: 0.08 INJECTION, SOLUTION INTRAVENOUS at 09:52

## 2025-07-21 RX ADMIN — Medication 10 ML: at 10:36

## 2025-07-21 RX ADMIN — INSULIN GLARGINE 15 UNITS: 100 INJECTION, SOLUTION SUBCUTANEOUS at 10:34

## 2025-07-21 NOTE — PLAN OF CARE
Goal Outcome Evaluation:  Plan of Care Reviewed With: patient           Outcome Evaluation: Mild chest discomfort, Patient states Nitro gtt helped with chest pain. Patient stated he did have a headache-Tylenol given, Wife at bedside, NPO since midnight for cardiac pet scan this am, Tele- NSR 48-67

## 2025-07-21 NOTE — OUTREACH NOTE
Prep Survey      Flowsheet Row Responses   [facility] patient discharged from? [place]   Is LACE score < 7 ? Yes   Eligibility [facility]   Date of Admission 07/20/25   Date of Discharge 07/21/25   Discharge diagnosis Unstable angina   Does the patient have one of the following disease processes/diagnoses(primary or secondary)? Other   Prep survey completed? Yes            Nikky CROFT - Registered Nurse

## 2025-07-21 NOTE — DISCHARGE SUMMARY
[facility]  DISCHARGE SUMMARY       Date of Admission: 7/20/2025  Date of Discharge:  7/21/2025  Primary Care Physician: Charlee Serra APRN    Presenting Problem/History of Present Illness:  Chest pain    Final Discharge Diagnoses:  Active Hospital Problems    Diagnosis     Chest pain     Tobacco abuse     Type 2 diabetes mellitus without complication, without long-term current use of insulin     Essential hypertension        Consults: None    Procedures Performed: 7/21/2025 PET myocardial scan    Pertinent Test Results:   Echocardiogram per [facility] 12/24/2023 7/21/2025 PET myocardial stress  Interpretation Summary         Overall, this is a low risk test for ischemia.  There is evidence of previous infarction in the LAD distribution without any significant purvi-infarct ischemia appreciated.    No ECG evidence of myocardial ischemia. Negative clinical evidence of myocardial ischemia. Findings consistent with a normal ECG stress test.    There is a moderate size, moderate intensity anterior/anterior apical perfusion defect present at rest and stress and persist with attenuation correction software and likely represents an area of previous infarction    Ejection fraction is 51% under stress, ejection fraction during rest is 48%    Sum stress score 10, sum risk score 8, sum difference score 2    Coronary flow under stress is 1.9, coronary flow during rest is 0.7, coronary reserve is 2.5    Coronary arteries are calcified    Imaging Results (All)       Procedure Component Value Units Date/Time    NM PET Cardiac Stress Radiology Interpretation - In process [[id number]] Resulted: 07/21/25 0831     Updated: 07/21/25 1013    This result has not been signed. Information might be incomplete.      XR Chest 1 View [[id number]] Collected: 07/20/25 1746     Updated: 07/20/25 1749    Narrative:      EXAM/TECHNIQUE: XR CHEST 1 VW-     INDICATION: chest pain;  R06.02-Shortness of breath; I20.0-Unstable  angina     COMPARISON: None available.     - Images were stored in PACS per institutional protocol.     FINDINGS:     Cardiomediastinal silhouette is within normal limits.      No pleural effusion. No visible pneumothorax. No focal consolidation.      No acute osseous findings.       Impression:         No acute findings.     This report was signed and finalized on 7/20/2025 5:46 PM by Dr. Piter Winslow MD.             LAB RESULTS:      Lab 07/21/25  0416 07/20/25  1603 07/20/25  1501   WBC 6.22  --  7.05   HEMOGLOBIN 14.2  --  14.7   HEMATOCRIT 42.3  --  43.4   PLATELETS 203  --  238   NEUTROS ABS 2.92  --  4.06   IMMATURE GRANS (ABS)  --   --  0.01   LYMPHS ABS  --   --  2.37   MONOS ABS  --   --  0.46   EOS ABS 0.06  --  0.09   MCV 89.4  --  87.3   D DIMER QUANT  --  <0.27  --          Lab 07/21/25  0416 07/20/25  1501   SODIUM 138 139   POTASSIUM 3.7 3.6   CHLORIDE 104 103   CO2 23.0 21.0*   ANION GAP 11.0 15.0   BUN 13.0 7.8   CREATININE 0.75* 0.82   EGFR 109.3 106.4   GLUCOSE 296* 270*   CALCIUM 8.5* 8.5*   MAGNESIUM 1.9 1.8   HEMOGLOBIN A1C 11.90*  --    TSH 1.140  --          Lab 07/20/25  1501   TOTAL PROTEIN 6.4   ALBUMIN 3.9   GLOBULIN 2.5   ALT (SGPT) 32   AST (SGOT) 24   BILIRUBIN 0.6   ALK PHOS 64         Lab 07/21/25  0416 07/20/25  1603 07/20/25  1501   HSTROP T 37* 34* 33*         Lab 07/21/25  0416   CHOLESTEROL 203*   LDL CHOL 63   HDL CHOL 34*   TRIGLYCERIDES 696*             Brief Urine Lab Results       None          Microbiology Results (last 10 days)       ** No results found for the last 240 hours. **          Microbiology Results (last 10 days)       ** No results found for the last 240 hours. **             Documented weights    07/20/25 1441 07/20/25 1758 07/21/25 0440   Weight: 120 kg (265 lb) 121 kg (267 lb 12.8 oz) 122 kg (268 lb 3.2 oz)        Hospital Course: James Mar is a 50-year-old male with a past medical history of cellulitis  and abscess of the left leg and recent infected finger followed by Dr. Kennedy for an additional MRSA infection, DVT, hyperlipidemia, hypertension, tobacco abuse, CAD NSTEMI x 2 and initial stent placement several years prior to most recent  cardiac catheterization due to NSTEMI 12/22/2023 with a successful intervention of proximal LAD stent thrombosis and balloon angioplasty on Effient at that time and followed by Dr. King at [facility].   Patient additionally continues to smoke 1 PPD.  Patient presented 7/20/2025 to [facility] with complaints of substernal chest pain that began this morning at approximately 0730.  He stated he was driving to [place] but the pain got too severe and he went to [place] instead.  According to telephone report per Dr. Salazar his initial troponin was 0.03 and his second at 0.06.  He was given 1 sublingual nitroglycerin, subcutaneous Lovenox 1 mg/kg and 324 of aspirin.  He continued to have significant pain and transfer was initiated.  Patient was told and report that he was pain-free however when he arrived patient was still having a pain of 7 out of 10.  Patient was placed on nitroglycerin gtt, EKG reviewed with no concerns for STEMI, CK2 36, initial troponin of 33, subsequent of 34 with 3% delta.  Patient's pain is currently 5/10 on nitro gtt. will continue to titrate up and initiate morphine as needed.  If troponins continue to rise and chest pain remains unresolved will consult cardiology otherwise patient will be n.p.o. after midnight for PET myocardial scan.  Patient was made aware of plan of care, all questions were answered the best my ability and he is in agreement for observation and treatment.     ED workup CK2 36, troponin 33, subsequent 34 with a 3% delta, CO2 21.0, , Ca 8.5, CBC unremarkable.  Chest x-ray pending.  D-dimer negative.    Chest pain, patient was admitted, placed on nitro gtt. which was titrated overnight.  No chest pain per  patient in the a.m., PET myocardial scan performed today with low risk for ischemia and no need for any additional workup per Dr. Mims, cardiology who read the scan.  Due to patient patient's ASCVD risk will initiate high-dose Lipitor 80 mg, patient is to continue his home ASA and metoprolol.  Patient was instructed that if chest pain returns to discuss with PCP for possible gastroenterology consultation for noncardiac chest pain.  We had an extensive discussion regarding tobacco cessation with his 2 previous NSTEMI's and his very high risk for recurrence.    Type 2 diabetes mellitus, A1c 11.1.  According to patient's spouse he has not had a PCP for quite some time and they were aware that he needed to be put on a better regimen.  Patient is scheduled to follow-up with [facility] internal medicine as a new patient next week to discuss among many things his diabetes mellitus.  Patient instructed to increase his Lantus to 25 units every 12 and to bring a blood glucose diary to his PCP appointment for alteration of dosage.  States that he was on Ozempic which was working well but had to discontinue due to cost.    This afternoon patient is resting on the side of the bed eating some lunch with his wife at bedside.  Patient is alert and oriented and able to participate in assessment and discharge planning.  Patient continues to state his chest pain is resolved as well as any residual shortness of breath or diaphoresis.  Patient verbalized understanding of plan of care to include close follow-up with new PCP for better diabetes management and continued health maintenance.  Patient's work answered to the best my ability and they are in agreement for discharge home today.    Patient has reached the maximum benefit of hospitalization and is stable for discharge  Patient has been evaluated today 07/21/25 and is stable for discharge.     Physical Exam on Discharge:  /79 (BP Location: Right arm, Patient Position:  "Lying)   Pulse 77   Temp 98 °F (36.7 °C) (Oral)   Resp 16   Ht 188 cm (74\")   Wt 122 kg (268 lb 3.2 oz)   SpO2 100%   BMI 34.43 kg/m²   Physical Exam  Vitals and nursing note reviewed.   Constitutional:       General: He is not in acute distress.     Appearance: Normal appearance. He is obese. He is not ill-appearing or toxic-appearing.   HENT:      Head: Normocephalic and atraumatic.      Right Ear: Tympanic membrane normal.      Left Ear: Tympanic membrane normal.      Nose: Nose normal.      Mouth/Throat:      Mouth: Mucous membranes are moist.      Pharynx: Oropharynx is clear.   Eyes:      Pupils: Pupils are equal, round, and reactive to light.   Cardiovascular:      Rate and Rhythm: Normal rate and regular rhythm.      Pulses: Normal pulses.   Pulmonary:      Effort: Pulmonary effort is normal. No respiratory distress.      Breath sounds: Normal breath sounds. No wheezing or rales.   Abdominal:      General: Abdomen is protuberant. Bowel sounds are normal. There is no distension.      Palpations: Abdomen is soft.      Tenderness: There is no abdominal tenderness.   Musculoskeletal:         General: Normal range of motion.      Cervical back: Normal range of motion and neck supple. No rigidity or tenderness.   Skin:     General: Skin is warm.      Capillary Refill: Capillary refill takes less than 2 seconds.   Neurological:      General: No focal deficit present.      Mental Status: He is alert and oriented to person, place, and time.   Psychiatric:         Mood and Affect: Mood normal.         Behavior: Behavior normal.         Thought Content: Thought content normal.         Judgment: Judgment normal.        Condition on Discharge: Stable for discharge home    Discharge Disposition:  Home or Self Care    Discharge Medications:     Discharge Medications        New Medications        Instructions Start Date   atorvastatin 80 MG tablet  Commonly known as: LIPITOR   80 mg, Oral, Nightly             Changes " to Medications        Instructions Start Date   insulin glargine 100 UNIT/ML injection  Commonly known as: LANTUS, SEMGLEE  What changed: how much to take   25 Units, Subcutaneous, Every 12 Hours Scheduled             Continue These Medications        Instructions Start Date   aspirin 81 MG EC tablet   81 mg, Daily      metoprolol succinate XL 50 MG 24 hr tablet  Commonly known as: TOPROL-XL   50 mg, Oral, Daily             Discharge Diet:   Diet Instructions       Diet: Cardiac Diets; Healthy Heart (2-3 Na+); Regular (IDDSI 7); Thin (IDDSI 0)      Discharge Diet: Cardiac Diets    Cardiac Diet: Healthy Heart (2-3 Na+)    Texture: Regular (IDDSI 7)    Fluid Consistency: Thin (IDDSI 0)            Activity at Discharge:   Activity Instructions       Activity as Tolerated              Discharge Instructions:   1.  Patient was instructed to return to medical attention for any new or worsening chest pain, shortness of breath or palpitations.  2.  Patient was instructed to follow-up with new PCP in 1 week.  3.  Patient was instructed verbally with discharge instructions regarding all new medications and medication changes.  4.  Patient was significantly on tobacco cessation.    Follow-up Appointments:   Future Appointments   Date Time Provider Department Center   7/28/2025  1:15 PM Georgina Murdock APRN [facility]       Test Results Pending at Discharge: None    Electronically signed by DAVID Bone, 07/21/25, 15:50 CDT.    Time: 35 minutes.

## 2025-07-22 ENCOUNTER — TRANSITIONAL CARE MANAGEMENT TELEPHONE ENCOUNTER (OUTPATIENT)
Dept: CALL CENTER | Facility: HOSPITAL | Age: 50
End: 2025-07-22
Payer: COMMERCIAL

## 2025-07-22 NOTE — OUTREACH NOTE
Call Center TCM Note      Flowsheet Row Responses   [facility] patient discharged from? [place]   Does the patient have one of the following disease processes/diagnoses(primary or secondary)? Other   TCM attempt successful? No   Unsuccessful attempts Attempt 1            Sofi SCHNEIDER - Licensed Nurse    7/22/2025, 12:29 CDT

## 2025-07-22 NOTE — OUTREACH NOTE
Call Center TCM Note      Flowsheet Row Responses   [facility] patient discharged from? [place]   Does the patient have one of the following disease processes/diagnoses(primary or secondary)? Other   TCM attempt successful? Yes   Call start time 1413   Call end time 1415   Discharge diagnosis Unstable angina   Meds reviewed with patient/caregiver? Yes   Is the patient having any side effects they believe may be caused by any medication additions or changes? No   Does the patient have all medications ordered at discharge? Yes   Is the patient taking all medications as directed (includes completed medication regime)? Yes   Comments New pt appt with Georgina Murdock 7/28 @ 1:15.   Does the patient have an appointment with their PCP within 7-14 days of discharge? Yes   Psychosocial issues? No   Did the patient receive a copy of their discharge instructions? Yes   Nursing interventions Reviewed instructions with patient   What is the patient's perception of their health status since discharge? Improving   Is the patient/caregiver able to teach back signs and symptoms related to disease process for when to call PCP? Yes   Is the patient/caregiver able to teach back signs and symptoms related to disease process for when to call 911? Yes   Is the patient/caregiver able to teach back the hierarchy of who to call/visit for symptoms/problems? PCP, Specialist, Home health nurse, Urgent Care, ED, 911 Yes   Additional teach back comments Denies any chest pains.  Does not have bp cuff to monitor bp and encouraged to get one to keep bp log.   TCM call completed? Yes   Wrap up additional comments No questions or needs at  this time.   Call end time 1415            Sofi SCHNEIDER - Licensed Nurse    7/22/2025, 14:16 CDT         impaired balance/cognition/impaired postural control/decreased strength

## 2025-07-28 ENCOUNTER — OFFICE VISIT (OUTPATIENT)
Dept: INTERNAL MEDICINE | Facility: CLINIC | Age: 50
End: 2025-07-28
Payer: COMMERCIAL

## 2025-07-28 VITALS
OXYGEN SATURATION: 92 % | BODY MASS INDEX: 35.62 KG/M2 | HEIGHT: 72 IN | TEMPERATURE: 97.7 F | HEART RATE: 85 BPM | RESPIRATION RATE: 16 BRPM | WEIGHT: 263 LBS | SYSTOLIC BLOOD PRESSURE: 102 MMHG | DIASTOLIC BLOOD PRESSURE: 68 MMHG

## 2025-07-28 DIAGNOSIS — E11.65 TYPE 2 DIABETES MELLITUS WITH HYPERGLYCEMIA, WITH LONG-TERM CURRENT USE OF INSULIN: Primary | ICD-10-CM

## 2025-07-28 DIAGNOSIS — Z79.4 TYPE 2 DIABETES MELLITUS WITH HYPERGLYCEMIA, WITH LONG-TERM CURRENT USE OF INSULIN: Primary | ICD-10-CM

## 2025-07-28 DIAGNOSIS — Z12.11 SCREENING FOR MALIGNANT NEOPLASM OF COLON: ICD-10-CM

## 2025-07-28 DIAGNOSIS — I87.333 CHRONIC VENOUS HYPERTENSION WITH ULCER AND INFLAMMATION INVOLVING BOTH SIDES: ICD-10-CM

## 2025-07-28 PROCEDURE — 1159F MED LIST DOCD IN RCRD: CPT

## 2025-07-28 PROCEDURE — 3078F DIAST BP <80 MM HG: CPT

## 2025-07-28 PROCEDURE — 3074F SYST BP LT 130 MM HG: CPT

## 2025-07-28 PROCEDURE — 3046F HEMOGLOBIN A1C LEVEL >9.0%: CPT

## 2025-07-28 PROCEDURE — 1126F AMNT PAIN NOTED NONE PRSNT: CPT

## 2025-07-28 PROCEDURE — 1160F RVW MEDS BY RX/DR IN RCRD: CPT

## 2025-07-28 PROCEDURE — 99495 TRANSJ CARE MGMT MOD F2F 14D: CPT

## 2025-07-28 RX ORDER — ASPIRIN 81 MG/1
81 TABLET ORAL DAILY
Qty: 30 TABLET | Refills: 0 | Status: SHIPPED | OUTPATIENT
Start: 2025-07-28

## 2025-07-28 RX ORDER — GLIPIZIDE 5 MG/1
5 TABLET ORAL
Qty: 30 TABLET | Refills: 0 | Status: SHIPPED | OUTPATIENT
Start: 2025-07-28

## 2025-07-28 NOTE — PROGRESS NOTES
Chief Complaint  Transitional Care Management ([facility] 07/21/2025./Patient states he is having problems with his BG now with this mornings reading of 328. Patient has questions about medications for diabeties.//)    Subjective    History of Present Illness {CC  Problem List  Visit Diagnosis   Encounters  Notes  Medications  Labs  Result Review Imaging  Media :23}     Patient presents to [facility] for   History of Present Illness   History of Present Illness  Mr. Mar is a pleasant 50-year-old male who presents with his wife today to establish care. He is mainly here for evaluation of diabetes mellitus. He was recently hospitalized due to chest pain, during which his A1c level was found to be elevated at 11.9. His blood glucose levels have been fluctuating, with a reading of 328 this morning and 216 upon waking yesterday. He has been making dietary changes, including reducing his sugar intake, eliminating snacks, and consuming more vegetables. He has also increased his physical activity by walking outdoors. He is currently on Lantus insulin, administered twice daily at a dose of 25 units each time. He has three pens of Lantus left. He was previously on Ozempic but he is not sure why this was discontinued. He was advised to discontinue metformin and glipizide during his hospital stay. He is also taking aspirin once daily for heart health and requires a refill of this medication. He needs a new referral sent for a colonoscopy.    He was in the hospital in April for wound of right finger that had difficulty healing. This is when his metformin and glipizide were discontinued and lantus was started.    Pertinent medical history includes hypertension, hyperlipidemia, DVT, and diabetes. History of appendectomy, cholecystectomy, and coronary stent placement.    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 He has reduced his sugar intake,  "eliminated snacks, and consumes more vegetables as part of his diet. He smoked cigarettes for 38-39 years but quit 4 days ago. He lives in [place].    PAST SURGICAL HISTORY:  The patient had finger surgery a couple of months ago.      I have reviewed and agree with the HPI and ROS information as above.  SHARITA Soto     Objective   Vital Signs:   /68 (BP Location: Left arm, Patient Position: Sitting, Cuff Size: Large Adult)   Pulse 85   Temp 97.7 °F (36.5 °C) (Infrared)   Resp 16   Ht 182.9 cm (72\")   Wt 119 kg (263 lb)   SpO2 92%   BMI 35.67 kg/m²           Physical Exam  Vitals and nursing note reviewed.   Constitutional:       Appearance: Normal appearance. He is obese.      Comments: pleasant   HENT:      Head: Normocephalic and atraumatic.   Eyes:      Conjunctiva/sclera: Conjunctivae normal.      Pupils: Pupils are equal, round, and reactive to light.   Cardiovascular:      Rate and Rhythm: Normal rate and regular rhythm.      Heart sounds: Normal heart sounds.   Pulmonary:      Effort: Pulmonary effort is normal.      Breath sounds: Normal breath sounds.   Musculoskeletal:         General: Normal range of motion.   Neurological:      General: No focal deficit present.      Mental Status: He is alert and oriented to person, place, and time. Mental status is at baseline.   Psychiatric:         Mood and Affect: Mood normal.         Behavior: Behavior normal.         Thought Content: Thought content normal.         Judgment: Judgment normal.            Result Review  Data Reviewed:{ Labs  Result Review  Imaging  Med Tab  Media :23}   The following data was reviewed by: SHARITA Soto on 07/28/2025:  A1C Last 3 Results          4/14/2025    14:52 7/21/2025    04:16   HGBA1C Last 3 Results   Hemoglobin A1C 9.30  11.90             Assessment and Plan {CC Problem List  Visit Diagnosis  ROS  Review (Popup)  Health Maintenance  Quality  BestPractice  Medications  SmartSets  SnapShot " Encounters  Media :23}     Diagnoses and all orders for this visit:    1. Type 2 diabetes mellitus with hyperglycemia, with long-term current use of insulin (Primary)  -     metFORMIN (GLUCOPHAGE) 500 MG tablet; Take 1 tablet by mouth 2 (Two) Times a Day With Meals.  Dispense: 60 tablet; Refill: 0  -     glipizide (GLUCOTROL) 5 MG tablet; Take 1 tablet by mouth 2 (Two) Times a Day Before Meals.  Dispense: 30 tablet; Refill: 0  -     glucose blood test strip; Use as instructed  Dispense: 25 each; Refill: 12    2. Chronic venous hypertension with ulcer and inflammation involving both sides  -     aspirin 81 MG EC tablet; Take 1 tablet by mouth Daily.  Dispense: 30 tablet; Refill: 0    3. Screening for malignant neoplasm of colon  -     Ambulatory Referral to Gastroenterology      Assessment & Plan  1. Diabetes Mellitus:  - A1c level significantly elevated at 11.9  - High blood glucose readings at home (328 this morning, 216 yesterday)  - Restarting metformin and glipizide (glipizide initiated at 5 mg)  - Continuing Lantus twice daily at 25 units each time  - Prescription for test strips provided  - Monitor blood glucose levels closely to prevent hypoglycemia; inform if levels fall below 70  -We will follow up in one month, recheck A1C and adjust medications based on results. May consider adding Ozempic or Mounjaro in future.  -Continue adhering to diabetic diet.    2. Medication Management:  - Refill for aspirin sent to pharmacy    3. Health Maintenance:  - Referral for colonoscopy made    Follow-up:  - Scheduled for 1 month from now      Patient or patient representative verbalized consent for the use of Ambient Listening during the visit with  SHARITA Soto for chart documentation. 7/29/2025  10:47 CDT    Return 8/27/25 for follow up and annual physical.  Follow Up {Instructions Charge Capture  Follow-up Communications :23}  Return in about 1 month (around 8/28/2025) for Recheck- diabetes.  Patient was  given instructions and counseling regarding his condition or for health maintenance advice. Please see specific information pulled into the AVS if appropriate.

## 2025-07-29 NOTE — PROGRESS NOTES
Transitional Care Follow Up Visit  Subjective     James Mar is a 50 y.o. male who presents for a transitional care management visit.    Within 48 business hours after discharge our office contacted him via telephone to coordinate his care and needs.      I reviewed and discussed the details of that call along with the discharge summary, hospital problems, inpatient lab results, inpatient diagnostic studies, and consultation reports with James.     Current outpatient and discharge medications have been reconciled for the patient.  Reviewed by: SHARITA Soto          7/21/2025     6:15 PM   Date of TCM Phone Call   [facility]   Date of Admission 7/20/2025   Date of Discharge 7/21/2025     Risk for Readmission (LACE) Score: 3 (7/21/2025  5:00 AM)      History of Present Illness   Course During Hospital Stay:   James Mar is a 50-year-old male with a past medical history of cellulitis and abscess of the left leg and recent infected finger followed by Dr. Kennedy for an additional MRSA infection, DVT, hyperlipidemia, hypertension, tobacco abuse, CAD NSTEMI x 2 and initial stent placement several years prior to most recent  cardiac catheterization due to NSTEMI 12/22/2023 with a successful intervention of proximal LAD stent thrombosis and balloon angioplasty on Effient at that time and followed by Dr. King at [facility].   Patient additionally continues to smoke 1 PPD.  Patient presented 7/20/2025 to [facility] with complaints of substernal chest pain that began this morning at approximately 0730.  He stated he was driving to [place] but the pain got too severe and he went to [place] instead.  According to telephone report per Dr. Salazar his initial troponin was 0.03 and his second at 0.06.  He was given 1 sublingual nitroglycerin, subcutaneous Lovenox 1 mg/kg and 324 of aspirin.  He continued to have significant pain and transfer was initiated.  Patient was  "told and report that he was pain-free however when he arrived patient was still having a pain of 7 out of 10.  Patient was placed on nitroglycerin gtt, EKG reviewed with no concerns for STEMI, CK2 36, initial troponin of 33, subsequent of 34 with 3% delta.  Patient's pain is currently 5/10 on nitro gtt. will continue to titrate up and initiate morphine as needed.  If troponins continue to rise and chest pain remains unresolved will consult cardiology otherwise patient will be n.p.o. after midnight for PET myocardial scan.  Patient was made aware of plan of care, all questions were answered the best my ability and he is in agreement for observation and treatment.     Type 2 diabetes mellitus, A1c 11.1. According to patient's spouse he has not had a PCP for quite some time and they were aware that he needed to be put on a better regimen. Patient is scheduled to follow-up with [facility] internal medicine as a new patient next week to discuss among many things his diabetes mellitus. Patient instructed to increase his Lantus to 25 units every 12 and to bring a blood glucose diary to his PCP appointment for alteration of dosage. States that he was on Ozempic which was working well but had to discontinue due to cost.      The following portions of the patient's history were reviewed and updated as appropriate: allergies, current medications, past family history, past medical history, past social history, past surgical history, and problem list.    Review of Systems    Objective   /68 (BP Location: Left arm, Patient Position: Sitting, Cuff Size: Large Adult)   Pulse 85   Temp 97.7 °F (36.5 °C) (Infrared)   Resp 16   Ht 182.9 cm (72\")   Wt 119 kg (263 lb)   SpO2 92%   BMI 35.67 kg/m²   Physical Exam    Assessment & Plan   Problems Addressed this Visit       Chronic venous hypertension with ulcer and inflammation involving both sides    Relevant Medications    aspirin 81 MG EC tablet     Other Visit " Diagnoses         Type 2 diabetes mellitus with hyperglycemia, with long-term current use of insulin    -  Primary    Relevant Medications    metFORMIN (GLUCOPHAGE) 500 MG tablet    glipizide (GLUCOTROL) 5 MG tablet    glucose blood test strip      Screening for malignant neoplasm of colon        Relevant Orders    Ambulatory Referral to Gastroenterology          Diagnoses         Codes Comments      Type 2 diabetes mellitus with hyperglycemia, with long-term current use of insulin    -  Primary ICD-10-CM: E11.65, Z79.4  ICD-9-CM: 250.00, 790.29, V58.67       Chronic venous hypertension with ulcer and inflammation involving both sides     ICD-10-CM: I87.333  ICD-9-CM: 459.33       Screening for malignant neoplasm of colon     ICD-10-CM: Z12.11  ICD-9-CM: V76.51             1. Diabetes Mellitus:  - A1c level significantly elevated at 11.9  - High blood glucose readings at home (328 this morning, 216 yesterday)  - Restarting metformin and glipizide (glipizide initiated at 5 mg)  - Continuing Lantus twice daily at 25 units each time  - Prescription for test strips provided  - Monitor blood glucose levels closely to prevent hypoglycemia; inform if levels fall below 70  -We will follow up in one month, recheck A1C and adjust medications based on results. May consider adding Ozempic or Mounjaro in future.  -Continue adhering to diabetic diet.    2. Medication Management:  - Refill for aspirin sent to pharmacy    3. Health Maintenance:  - Referral for colonoscopy made    Follow-up is scheduled with me for 8/27/25. He has a follow up scheduled with [facility] for 10/27/25.

## 2025-08-24 DIAGNOSIS — E11.65 TYPE 2 DIABETES MELLITUS WITH HYPERGLYCEMIA, WITH LONG-TERM CURRENT USE OF INSULIN: ICD-10-CM

## 2025-08-24 DIAGNOSIS — Z79.4 TYPE 2 DIABETES MELLITUS WITH HYPERGLYCEMIA, WITH LONG-TERM CURRENT USE OF INSULIN: ICD-10-CM

## 2025-08-24 DIAGNOSIS — I87.333 CHRONIC VENOUS HYPERTENSION WITH ULCER AND INFLAMMATION INVOLVING BOTH SIDES: ICD-10-CM

## 2025-08-25 RX ORDER — ASPIRIN 81 MG/1
81 TABLET, COATED ORAL DAILY
Qty: 30 TABLET | Refills: 0 | Status: SHIPPED | OUTPATIENT
Start: 2025-08-25 | End: 2025-08-27 | Stop reason: SDUPTHER

## 2025-08-27 ENCOUNTER — OFFICE VISIT (OUTPATIENT)
Dept: INTERNAL MEDICINE | Facility: CLINIC | Age: 50
End: 2025-08-27
Payer: COMMERCIAL

## 2025-08-27 VITALS
WEIGHT: 266.8 LBS | HEIGHT: 72 IN | SYSTOLIC BLOOD PRESSURE: 130 MMHG | TEMPERATURE: 98 F | OXYGEN SATURATION: 97 % | HEART RATE: 75 BPM | DIASTOLIC BLOOD PRESSURE: 78 MMHG | BODY MASS INDEX: 36.14 KG/M2

## 2025-08-27 DIAGNOSIS — Z00.00 ANNUAL PHYSICAL EXAM: Primary | ICD-10-CM

## 2025-08-27 DIAGNOSIS — Z87.891 FORMER SMOKER: ICD-10-CM

## 2025-08-27 DIAGNOSIS — I87.2 VENOUS INSUFFICIENCY OF BOTH LOWER EXTREMITIES: ICD-10-CM

## 2025-08-27 DIAGNOSIS — E11.65 TYPE 2 DIABETES MELLITUS WITH HYPERGLYCEMIA, WITH LONG-TERM CURRENT USE OF INSULIN: ICD-10-CM

## 2025-08-27 DIAGNOSIS — Z79.4 TYPE 2 DIABETES MELLITUS WITH HYPERGLYCEMIA, WITH LONG-TERM CURRENT USE OF INSULIN: ICD-10-CM

## 2025-08-27 RX ORDER — GLIPIZIDE 5 MG/1
5 TABLET ORAL
Qty: 180 TABLET | Refills: 1 | Status: SHIPPED | OUTPATIENT
Start: 2025-08-27

## 2025-08-27 RX ORDER — ATORVASTATIN CALCIUM 80 MG/1
80 TABLET, FILM COATED ORAL NIGHTLY
Qty: 90 TABLET | Refills: 3 | Status: SHIPPED | OUTPATIENT
Start: 2025-08-27

## 2025-08-27 RX ORDER — ASPIRIN 81 MG/1
81 TABLET ORAL DAILY
Qty: 90 TABLET | Refills: 1 | Status: SHIPPED | OUTPATIENT
Start: 2025-08-27

## (undated) DEVICE — ST INF 2NDARY 20DRP VNT/NOVNT 30IN

## (undated) DEVICE — PREP SOL POVIDONE/IODINE BT 4OZ

## (undated) DEVICE — BNDG ELAS W/CLIP 6IN 10YD LF STRL

## (undated) DEVICE — BNDG ADHS CURAD FLX/FABRC 1X3IN STRL LF

## (undated) DEVICE — SPNG GZ WOVN 4X4IN 12PLY 10/BX STRL

## (undated) DEVICE — DISPOSABLE TOURNIQUET CUFF 34"X4", 1-LINE, BLUE, STERILE, 1EA/PK, 10PK/CS: Brand: ASP MEDICAL

## (undated) DEVICE — PAD MINOR UNIVERSAL: Brand: MEDLINE INDUSTRIES, INC.

## (undated) DEVICE — SYR LL TP 10ML STRL

## (undated) DEVICE — DRSNG WND GZ CURAD OIL EMULSION 3X3IN STRL

## (undated) DEVICE — STERILE (14X122CM) TELESCOPICALLY-FOLDED COVER: Brand: CIV-CLEAR™ TRANSDUCER COVER

## (undated) DEVICE — PAD UNDERCAST WYTEX 6IN 4YD LF STRL

## (undated) DEVICE — INTENDED FOR TISSUE SEPARATION, AND OTHER PROCEDURES THAT REQUIRE A SHARP SURGICAL BLADE TO PUNCTURE OR CUT.: Brand: BARD-PARKER ®  SAFETY SCALPED

## (undated) DEVICE — GLV SURG SENSICARE W/ALOE PF LF 7.5 STRL

## (undated) DEVICE — BNDG ELAS ECON W/CLIP 4IN 5YD LF STRL

## (undated) DEVICE — NEEDLE, QUINCKE, 20GX3.5": Brand: MEDLINE

## (undated) DEVICE — BANDAGE,GAUZE,BULKEE II,4.5"X4.1YD,STRL: Brand: MEDLINE

## (undated) DEVICE — CATH ENDOVENOUS RF/ABL CLOSUREFAST 7F 100CM

## (undated) DEVICE — STPCK 4WY ON/OFF VLV M/COLAR FIT 45PSI STRL

## (undated) DEVICE — STERILE ULTRASOUND GEL, SAFEWRAP: Brand: ECOVUE

## (undated) DEVICE — CVR BRD ARM 13X30

## (undated) DEVICE — TRAP FLD MINIVAC MEGADYNE 100ML

## (undated) DEVICE — INTENDED FOR TISSUE SEPARATION, AND OTHER PROCEDURES THAT REQUIRE A SHARP SURGICAL BLADE TO PUNCTURE OR CUT.: Brand: BARD-PARKER ® STAINLESS STEEL BLADES

## (undated) DEVICE — ST TB EXT STANDARDBORE 30IN

## (undated) DEVICE — GAUZE,SPONGE,4"X4",12PLY,STERILE,LF,2'S: Brand: MEDLINE

## (undated) DEVICE — GLV SURG DERMASSURE GRN LF PF 8.0

## (undated) DEVICE — PK EXTRM 30

## (undated) DEVICE — PATIENT RETURN ELECTRODE, SINGLE-USE, CONTACT QUALITY MONITORING, ADULT, WITH 9FT CORD, FOR PATIENTS WEIGING OVER 33LBS. (15KG): Brand: MEGADYNE

## (undated) DEVICE — 4-PORT MANIFOLD: Brand: NEPTUNE 2

## (undated) DEVICE — STRIP,CLOSURE,WOUND,MEDI-STRIP,1/2X4: Brand: MEDLINE

## (undated) DEVICE — SHEATH INTRO MICRO 7F 11CM